# Patient Record
Sex: FEMALE | Race: WHITE | Employment: FULL TIME | URBAN - METROPOLITAN AREA
[De-identification: names, ages, dates, MRNs, and addresses within clinical notes are randomized per-mention and may not be internally consistent; named-entity substitution may affect disease eponyms.]

---

## 2017-01-15 PROBLEM — R55 NEAR SYNCOPE: Status: ACTIVE | Noted: 2017-01-15

## 2017-01-16 PROBLEM — R00.2 PALPITATIONS: Status: ACTIVE | Noted: 2017-01-16

## 2017-01-18 ENCOUNTER — TELEPHONE (OUTPATIENT)
Dept: CARDIOLOGY CLINIC | Age: 32
End: 2017-01-18

## 2017-01-19 DIAGNOSIS — R07.2 PRECORDIAL PAIN: ICD-10-CM

## 2017-01-19 DIAGNOSIS — R55 SYNCOPE AND COLLAPSE: Primary | ICD-10-CM

## 2017-01-19 DIAGNOSIS — R00.2 PALPITATIONS: ICD-10-CM

## 2017-01-23 ENCOUNTER — TELEPHONE (OUTPATIENT)
Dept: CARDIOLOGY CLINIC | Age: 32
End: 2017-01-23

## 2017-01-31 ENCOUNTER — TELEPHONE (OUTPATIENT)
Dept: CARDIOLOGY CLINIC | Age: 32
End: 2017-01-31

## 2017-02-01 ENCOUNTER — TELEPHONE (OUTPATIENT)
Dept: CARDIOLOGY CLINIC | Age: 32
End: 2017-02-01

## 2017-02-08 ENCOUNTER — TELEPHONE (OUTPATIENT)
Dept: CARDIOLOGY CLINIC | Age: 32
End: 2017-02-08

## 2017-02-21 ENCOUNTER — TELEPHONE (OUTPATIENT)
Dept: CARDIOLOGY CLINIC | Age: 32
End: 2017-02-21

## 2017-03-03 ENCOUNTER — OFFICE VISIT (OUTPATIENT)
Dept: CARDIOLOGY CLINIC | Age: 32
End: 2017-03-03

## 2017-03-03 VITALS
HEIGHT: 62 IN | WEIGHT: 158 LBS | DIASTOLIC BLOOD PRESSURE: 70 MMHG | SYSTOLIC BLOOD PRESSURE: 108 MMHG | BODY MASS INDEX: 29.08 KG/M2 | OXYGEN SATURATION: 97 % | HEART RATE: 95 BPM

## 2017-03-03 DIAGNOSIS — R00.2 PALPITATIONS: Primary | ICD-10-CM

## 2017-03-03 PROCEDURE — 93272 ECG/REVIEW INTERPRET ONLY: CPT | Performed by: INTERNAL MEDICINE

## 2017-03-03 PROCEDURE — 99204 OFFICE O/P NEW MOD 45 MIN: CPT | Performed by: INTERNAL MEDICINE

## 2017-03-03 PROCEDURE — 93000 ELECTROCARDIOGRAM COMPLETE: CPT | Performed by: INTERNAL MEDICINE

## 2017-03-03 RX ORDER — NADOLOL 20 MG/1
10 TABLET ORAL DAILY
Qty: 30 TABLET | Refills: 3 | Status: SHIPPED | OUTPATIENT
Start: 2017-03-03 | End: 2017-03-14 | Stop reason: SINTOL

## 2017-03-07 DIAGNOSIS — R00.2 PALPITATIONS: Primary | ICD-10-CM

## 2017-03-08 PROCEDURE — 93224 XTRNL ECG REC UP TO 48 HRS: CPT | Performed by: INTERNAL MEDICINE

## 2017-03-09 DIAGNOSIS — R00.2 PALPITATIONS: ICD-10-CM

## 2017-03-10 ENCOUNTER — TELEPHONE (OUTPATIENT)
Dept: CARDIOLOGY CLINIC | Age: 32
End: 2017-03-10

## 2017-03-27 ENCOUNTER — OFFICE VISIT (OUTPATIENT)
Dept: CARDIOLOGY CLINIC | Age: 32
End: 2017-03-27

## 2017-03-27 VITALS
DIASTOLIC BLOOD PRESSURE: 78 MMHG | BODY MASS INDEX: 29.81 KG/M2 | HEIGHT: 62 IN | SYSTOLIC BLOOD PRESSURE: 110 MMHG | HEART RATE: 89 BPM | WEIGHT: 162 LBS | OXYGEN SATURATION: 99 %

## 2017-03-27 DIAGNOSIS — R00.2 PALPITATIONS: ICD-10-CM

## 2017-03-27 DIAGNOSIS — R07.9 CHEST PAIN, UNSPECIFIED TYPE: Primary | ICD-10-CM

## 2017-03-27 PROCEDURE — 93000 ELECTROCARDIOGRAM COMPLETE: CPT | Performed by: INTERNAL MEDICINE

## 2017-03-27 PROCEDURE — 99214 OFFICE O/P EST MOD 30 MIN: CPT | Performed by: INTERNAL MEDICINE

## 2017-04-13 ENCOUNTER — HOSPITAL ENCOUNTER (OUTPATIENT)
Dept: ULTRASOUND IMAGING | Age: 32
Discharge: HOME | End: 2017-04-13
Attending: NURSE PRACTITIONER | Admitting: NURSE PRACTITIONER

## 2017-04-13 DIAGNOSIS — R00.2 PALPITATIONS: ICD-10-CM

## 2017-04-13 DIAGNOSIS — N64.4 PAINFUL BREASTS: ICD-10-CM

## 2018-01-26 ENCOUNTER — HOSPITAL ENCOUNTER (OUTPATIENT)
Dept: NON INVASIVE DIAGNOSTICS | Age: 33
Discharge: HOME | End: 2018-01-26
Attending: NURSE PRACTITIONER | Admitting: NURSE PRACTITIONER

## 2018-01-26 DIAGNOSIS — R00.2 PALPITATIONS: ICD-10-CM

## 2018-02-01 LAB
ACQUISITION DURATION: NORMAL S
AVERAGE HEART RATE: 85 BPM
EKG DIAGNOSIS: NORMAL
HOLTER MAX HEART RATE: 135 BPM
HOOKUP DATE: NORMAL
HOOKUP TIME: NORMAL
Lab: NORMAL
MAX HEART RATE TIME/DATE: NORMAL
MIN HEART RATE TIME/DATE: NORMAL
MIN HEART RATE: 53 BPM
NUMBER OF QRS COMPLEXES: NORMAL
NUMBER OF SUPRAVENTRICULAR BEATS IN RUNS: 0
NUMBER OF SUPRAVENTRICULAR COUPLETS: 0
NUMBER OF SUPRAVENTRICULAR ECTOPICS: 0
NUMBER OF SUPRAVENTRICULAR ISOLATED BEATS: 0
NUMBER OF SUPRAVENTRICULAR RUNS: 0
NUMBER OF VENTRICULAR BEATS IN RUNS: 0
NUMBER OF VENTRICULAR BIGEMINAL CYCLES: 0
NUMBER OF VENTRICULAR COUPLETS: 0
NUMBER OF VENTRICULAR ECTOPICS: 11
NUMBER OF VENTRICULAR ISOLATED BEATS: 11
NUMBER OF VENTRICULAR RUNS: 0

## 2018-02-21 ENCOUNTER — HOSPITAL ENCOUNTER (OUTPATIENT)
Dept: MAMMOGRAPHY | Age: 33
Discharge: HOME | End: 2018-02-21
Admitting: SURGERY

## 2018-02-21 ENCOUNTER — OFFICE VISIT (OUTPATIENT)
Dept: SURGERY | Age: 33
End: 2018-02-21

## 2018-02-21 VITALS
DIASTOLIC BLOOD PRESSURE: 67 MMHG | HEIGHT: 62 IN | BODY MASS INDEX: 27.6 KG/M2 | HEART RATE: 88 BPM | SYSTOLIC BLOOD PRESSURE: 99 MMHG | WEIGHT: 150 LBS

## 2018-02-21 DIAGNOSIS — N64.4 BREAST PAIN, LEFT: ICD-10-CM

## 2018-02-21 DIAGNOSIS — Z80.3 FAMILY HISTORY OF BREAST CANCER: ICD-10-CM

## 2018-02-21 DIAGNOSIS — R92.8 ABNORMAL MAMMOGRAM: ICD-10-CM

## 2018-02-21 DIAGNOSIS — Z91.89 AT HIGH RISK FOR BREAST CANCER: ICD-10-CM

## 2018-02-21 DIAGNOSIS — N64.52 NIPPLE DISCHARGE: ICD-10-CM

## 2018-02-21 DIAGNOSIS — N64.52 NIPPLE DISCHARGE: Primary | ICD-10-CM

## 2018-02-21 PROCEDURE — 99244 OFF/OP CNSLTJ NEW/EST MOD 40: CPT | Performed by: SURGERY

## 2018-02-21 RX ORDER — METRONIDAZOLE 250 MG/1
500 TABLET ORAL 3 TIMES DAILY
Qty: 84 TABLET | Refills: 0 | Status: SHIPPED | OUTPATIENT
Start: 2018-02-21 | End: 2018-03-07

## 2018-02-21 RX ORDER — CEPHALEXIN 500 MG/1
500 CAPSULE ORAL 3 TIMES DAILY
Qty: 42 CAPSULE | Refills: 0 | Status: SHIPPED | OUTPATIENT
Start: 2018-02-21 | End: 2018-03-07

## 2018-02-21 NOTE — PATIENT INSTRUCTIONS
Patient Education        Breast Self-Exam: Care Instructions  Your Care Instructions    A breast self-exam is when you check your breasts for lumps or changes. This regular exam helps you learn how your breasts normally look and feel. Most breast problems or changes are not because of cancer. Breast self-exam is not a substitute for a mammogram. Having regular breast exams by your doctor and regular mammograms improve your chances of finding any problems with your breasts. Some women set a time each month to do a step-by-step breast self-exam. Other women like a less formal system. They might look at their breasts as they brush their teeth, or feel their breasts once in a while in the shower. If you notice a change in your breast, tell your doctor. Follow-up care is a key part of your treatment and safety. Be sure to make and go to all appointments, and call your doctor if you are having problems. It's also a good idea to know your test results and keep a list of the medicines you take. How do you do a breast self-exam?  · The best time to examine your breasts is usually one week after your menstrual period begins. Your breasts should not be tender then. If you do not have periods, you might do your exam on a day of the month that is easy to remember. · To examine your breasts:  ¨ Remove all your clothes above the waist and lie down. When you are lying down, your breast tissue spreads evenly over your chest wall, which makes it easier to feel all your breast tissue. ¨ Use the pads-not the fingertips-of the 3 middle fingers of your left hand to check your right breast. Move your fingers slowly in small coin-sized circles that overlap. ¨ Use three levels of pressure to feel of all your breast tissue. Use light pressure to feel the tissue close to the skin surface. Use medium pressure to feel a little deeper. Use firm pressure to feel your tissue close to your breastbone and ribs.  Use each pressure level to feel your breast tissue before moving on to the next spot. ¨ Check your entire breast, moving up and down as if following a strip from the collarbone to the bra line, and from the armpit to the ribs. Repeat until you have covered the entire breast.  ¨ Repeat this procedure for your left breast, using the pads of the 3 middle fingers of your right hand. · To examine your breasts while in the shower:  ¨ Place one arm over your head and lightly soap your breast on that side. ¨ Using the pads of your fingers, gently move your hand over your breast (in the strip pattern described above), feeling carefully for any lumps or changes. ¨ Repeat for the other breast.  · Have your doctor inspect anything you notice to see if you need further testing. Where can you learn more? Go to [email or web address] and sign in to your [facility] account. Enter P148 in the [employer] box to learn more about \"Breast Self-Exam: Care Instructions. \"     If you do not have an account, please click on the \"Sign Up Now\" link. Current as of: May 12, 2017  Content Version: 11.5  © 0543-9063 Healthwise, Incorporated. Care instructions adapted under license by [facility] [place] ([facility]). If you have questions about a medical condition or this instruction, always ask your healthcare professional. [address] any warranty or liability for your use of this information.

## 2018-03-05 ENCOUNTER — TELEPHONE (OUTPATIENT)
Dept: SURGERY | Age: 33
End: 2018-03-05

## 2018-04-04 ENCOUNTER — TELEPHONE (OUTPATIENT)
Dept: CARDIOLOGY CLINIC | Age: 33
End: 2018-04-04

## 2018-07-27 ENCOUNTER — HOSPITAL ENCOUNTER (EMERGENCY)
Age: 33
Discharge: ANOTHER HOSPITAL | End: 2018-07-27
Attending: EMERGENCY MEDICINE
Payer: COMMERCIAL

## 2018-07-27 ENCOUNTER — APPOINTMENT (OUTPATIENT)
Dept: CT IMAGING | Age: 33
End: 2018-07-27
Payer: COMMERCIAL

## 2018-07-27 ENCOUNTER — APPOINTMENT (OUTPATIENT)
Dept: GENERAL RADIOLOGY | Age: 33
End: 2018-07-27
Payer: COMMERCIAL

## 2018-07-27 ENCOUNTER — HOSPITAL ENCOUNTER (INPATIENT)
Age: 33
LOS: 1 days | Discharge: HOME | DRG: 312 | End: 2018-07-28
Attending: INTERNAL MEDICINE | Admitting: INTERNAL MEDICINE
Payer: COMMERCIAL

## 2018-07-27 VITALS
HEIGHT: 62 IN | BODY MASS INDEX: 33.13 KG/M2 | RESPIRATION RATE: 18 BRPM | SYSTOLIC BLOOD PRESSURE: 121 MMHG | DIASTOLIC BLOOD PRESSURE: 69 MMHG | TEMPERATURE: 98.2 F | HEART RATE: 90 BPM | WEIGHT: 180 LBS | OXYGEN SATURATION: 99 %

## 2018-07-27 DIAGNOSIS — R55 SYNCOPE, UNSPECIFIED SYNCOPE TYPE: Primary | ICD-10-CM

## 2018-07-27 LAB
A/G RATIO: 1.3 (ref 1.1–2.2)
ALBUMIN SERPL-MCNC: 4.6 G/DL (ref 3.4–5)
ALP BLD-CCNC: 65 U/L (ref 40–129)
ALT SERPL-CCNC: 20 U/L (ref 10–40)
ANION GAP SERPL CALCULATED.3IONS-SCNC: 12 MMOL/L (ref 3–16)
APTT: 32.2 SEC (ref 26–36)
AST SERPL-CCNC: 20 U/L (ref 15–37)
BASOPHILS ABSOLUTE: 0 K/UL (ref 0–0.2)
BASOPHILS RELATIVE PERCENT: 0.7 %
BILIRUB SERPL-MCNC: 0.4 MG/DL (ref 0–1)
BUN BLDV-MCNC: 14 MG/DL (ref 7–20)
CALCIUM SERPL-MCNC: 10.2 MG/DL (ref 8.3–10.6)
CHLORIDE BLD-SCNC: 101 MMOL/L (ref 99–110)
CO2: 26 MMOL/L (ref 21–32)
CREAT SERPL-MCNC: 0.7 MG/DL (ref 0.6–1.1)
D DIMER: <200 NG/ML DDU (ref 0–229)
EOSINOPHILS ABSOLUTE: 0.1 K/UL (ref 0–0.6)
EOSINOPHILS RELATIVE PERCENT: 1.5 %
GFR AFRICAN AMERICAN: >60
GFR NON-AFRICAN AMERICAN: >60
GLOBULIN: 3.5 G/DL
GLUCOSE BLD-MCNC: 129 MG/DL (ref 70–99)
HCG QUALITATIVE: NEGATIVE
HCT VFR BLD CALC: 38.9 % (ref 36–48)
HEMOGLOBIN: 12.9 G/DL (ref 12–16)
INR BLD: 1.14 (ref 0.86–1.14)
LYMPHOCYTES ABSOLUTE: 2.2 K/UL (ref 1–5.1)
LYMPHOCYTES RELATIVE PERCENT: 37.6 %
MCH RBC QN AUTO: 28.1 PG (ref 26–34)
MCHC RBC AUTO-ENTMCNC: 33.1 G/DL (ref 31–36)
MCV RBC AUTO: 84.8 FL (ref 80–100)
MONOCYTES ABSOLUTE: 0.4 K/UL (ref 0–1.3)
MONOCYTES RELATIVE PERCENT: 7.2 %
NEUTROPHILS ABSOLUTE: 3.1 K/UL (ref 1.7–7.7)
NEUTROPHILS RELATIVE PERCENT: 53 %
PDW BLD-RTO: 14.4 % (ref 12.4–15.4)
PLATELET # BLD: 291 K/UL (ref 135–450)
PMV BLD AUTO: 8 FL (ref 5–10.5)
POTASSIUM SERPL-SCNC: 3.5 MMOL/L (ref 3.5–5.1)
PROTHROMBIN TIME: 12.9 SEC (ref 9.8–13)
RBC # BLD: 4.59 M/UL (ref 4–5.2)
SODIUM BLD-SCNC: 139 MMOL/L (ref 136–145)
TOTAL PROTEIN: 8.1 G/DL (ref 6.4–8.2)
TROPONIN: <0.01 NG/ML
WBC # BLD: 5.8 K/UL (ref 4–11)

## 2018-07-27 PROCEDURE — 84703 CHORIONIC GONADOTROPIN ASSAY: CPT

## 2018-07-27 PROCEDURE — 72125 CT NECK SPINE W/O DYE: CPT

## 2018-07-27 PROCEDURE — 85610 PROTHROMBIN TIME: CPT

## 2018-07-27 PROCEDURE — 85379 FIBRIN DEGRADATION QUANT: CPT

## 2018-07-27 PROCEDURE — 1200000000 HC SEMI PRIVATE

## 2018-07-27 PROCEDURE — 93005 ELECTROCARDIOGRAM TRACING: CPT | Performed by: EMERGENCY MEDICINE

## 2018-07-27 PROCEDURE — 85730 THROMBOPLASTIN TIME PARTIAL: CPT

## 2018-07-27 PROCEDURE — 85025 COMPLETE CBC W/AUTO DIFF WBC: CPT

## 2018-07-27 PROCEDURE — 96360 HYDRATION IV INFUSION INIT: CPT

## 2018-07-27 PROCEDURE — 36415 COLL VENOUS BLD VENIPUNCTURE: CPT

## 2018-07-27 PROCEDURE — G0378 HOSPITAL OBSERVATION PER HR: HCPCS

## 2018-07-27 PROCEDURE — 71045 X-RAY EXAM CHEST 1 VIEW: CPT

## 2018-07-27 PROCEDURE — 70450 CT HEAD/BRAIN W/O DYE: CPT

## 2018-07-27 PROCEDURE — 2580000003 HC RX 258: Performed by: EMERGENCY MEDICINE

## 2018-07-27 PROCEDURE — 80053 COMPREHEN METABOLIC PANEL: CPT

## 2018-07-27 PROCEDURE — 84484 ASSAY OF TROPONIN QUANT: CPT

## 2018-07-27 PROCEDURE — G0379 DIRECT REFER HOSPITAL OBSERV: HCPCS

## 2018-07-27 PROCEDURE — 99285 EMERGENCY DEPT VISIT HI MDM: CPT

## 2018-07-27 RX ORDER — CLONAZEPAM 1 MG/1
1 TABLET ORAL EVERY 6 HOURS PRN
Status: DISCONTINUED | OUTPATIENT
Start: 2018-07-27 | End: 2018-07-28 | Stop reason: HOSPADM

## 2018-07-27 RX ORDER — POTASSIUM CHLORIDE 7.45 MG/ML
10 INJECTION INTRAVENOUS PRN
Status: DISCONTINUED | OUTPATIENT
Start: 2018-07-27 | End: 2018-07-28 | Stop reason: HOSPADM

## 2018-07-27 RX ORDER — CLONIDINE HYDROCHLORIDE 0.1 MG/1
0.1 TABLET ORAL NIGHTLY
Status: DISCONTINUED | OUTPATIENT
Start: 2018-07-28 | End: 2018-07-28 | Stop reason: HOSPADM

## 2018-07-27 RX ORDER — SODIUM CHLORIDE 0.9 % (FLUSH) 0.9 %
10 SYRINGE (ML) INJECTION PRN
Status: DISCONTINUED | OUTPATIENT
Start: 2018-07-27 | End: 2018-07-28 | Stop reason: HOSPADM

## 2018-07-27 RX ORDER — SODIUM CHLORIDE 0.9 % (FLUSH) 0.9 %
10 SYRINGE (ML) INJECTION EVERY 12 HOURS SCHEDULED
Status: DISCONTINUED | OUTPATIENT
Start: 2018-07-28 | End: 2018-07-28 | Stop reason: HOSPADM

## 2018-07-27 RX ORDER — POTASSIUM CHLORIDE 20MEQ/15ML
40 LIQUID (ML) ORAL PRN
Status: DISCONTINUED | OUTPATIENT
Start: 2018-07-27 | End: 2018-07-28 | Stop reason: HOSPADM

## 2018-07-27 RX ORDER — FAMOTIDINE 20 MG/1
20 TABLET, FILM COATED ORAL 2 TIMES DAILY
Status: DISCONTINUED | OUTPATIENT
Start: 2018-07-28 | End: 2018-07-28 | Stop reason: HOSPADM

## 2018-07-27 RX ORDER — ONDANSETRON 2 MG/ML
4 INJECTION INTRAMUSCULAR; INTRAVENOUS EVERY 6 HOURS PRN
Status: DISCONTINUED | OUTPATIENT
Start: 2018-07-27 | End: 2018-07-28 | Stop reason: HOSPADM

## 2018-07-27 RX ORDER — 0.9 % SODIUM CHLORIDE 0.9 %
1000 INTRAVENOUS SOLUTION INTRAVENOUS ONCE
Status: COMPLETED | OUTPATIENT
Start: 2018-07-27 | End: 2018-07-27

## 2018-07-27 RX ORDER — SODIUM CHLORIDE 9 MG/ML
INJECTION, SOLUTION INTRAVENOUS CONTINUOUS
Status: DISCONTINUED | OUTPATIENT
Start: 2018-07-28 | End: 2018-07-28 | Stop reason: HOSPADM

## 2018-07-27 RX ORDER — POTASSIUM CHLORIDE 20 MEQ/1
40 TABLET, EXTENDED RELEASE ORAL PRN
Status: DISCONTINUED | OUTPATIENT
Start: 2018-07-27 | End: 2018-07-28 | Stop reason: HOSPADM

## 2018-07-27 RX ORDER — MAGNESIUM SULFATE 1 G/100ML
1 INJECTION INTRAVENOUS PRN
Status: DISCONTINUED | OUTPATIENT
Start: 2018-07-27 | End: 2018-07-28 | Stop reason: HOSPADM

## 2018-07-27 RX ORDER — ACETAMINOPHEN 325 MG/1
650 TABLET ORAL EVERY 4 HOURS PRN
Status: DISCONTINUED | OUTPATIENT
Start: 2018-07-27 | End: 2018-07-28 | Stop reason: HOSPADM

## 2018-07-27 RX ADMIN — SODIUM CHLORIDE 1000 ML: 9 INJECTION, SOLUTION INTRAVENOUS at 15:47

## 2018-07-27 ASSESSMENT — ENCOUNTER SYMPTOMS
SHORTNESS OF BREATH: 0
COUGH: 0
VOMITING: 0
NAUSEA: 1
DIARRHEA: 0
ABDOMINAL PAIN: 0

## 2018-07-27 ASSESSMENT — PAIN SCALES - GENERAL
PAINLEVEL_OUTOF10: 2
PAINLEVEL_OUTOF10: 6
PAINLEVEL_OUTOF10: 6

## 2018-07-27 ASSESSMENT — PAIN DESCRIPTION - LOCATION
LOCATION: CHEST;HEAD
LOCATION: CHEST
LOCATION: CHEST;HEAD

## 2018-07-27 ASSESSMENT — PAIN DESCRIPTION - FREQUENCY
FREQUENCY: INTERMITTENT
FREQUENCY: CONTINUOUS
FREQUENCY: INTERMITTENT

## 2018-07-27 ASSESSMENT — PAIN DESCRIPTION - PAIN TYPE
TYPE: ACUTE PAIN

## 2018-07-27 NOTE — ED NOTES
536 at [facility]; report to [phone], [address] [facility] Day  07/27/18 3763

## 2018-07-27 NOTE — ED PROVIDER NOTES
Teofilo Smith is a 29 y.o. female presents with loc. While the patient was at [employer] she states that she felt diaphoretic, palpitations and she was standing over the chicken display and had a loc and then was driven here by a couple at [employer]. She states that she awoke on the floor. She states that  she has had chest pain that will last all day. Denies fevers, chills, cough, cold symptoms, v/d. She has felt nauseated. She states that she has been worked up at [facility] for syncope in the past and had to wear a halter monitor for 1 week and then 30 days. This was approximately a year ago. . Pt 8 yr old daughter had a cardiac arrest while they were in [place] on vacation and was then flown to [place] and had a subcutaneous ICD placed. Her father has had 4 heart attacks and an arrhythmia. Patient states that [facility] [place] genetic testing but they will not have results for 4 weeks. HPI    Review of Systems   Constitutional: Positive for diaphoresis. Negative for chills and fever. HENT: Negative for congestion. Eyes: Negative for visual disturbance. Respiratory: Negative for cough and shortness of breath. Cardiovascular: Positive for chest pain and palpitations. Gastrointestinal: Positive for nausea. Negative for abdominal pain, diarrhea and vomiting. Genitourinary: Negative for difficulty urinating. Musculoskeletal: Negative for neck pain. Skin: Negative for wound. Neurological: Positive for syncope, light-headedness and headaches. PAST MEDICAL HISTORY   has a past medical history of Ankle fracture, right; Anxiety; Anxiety; Cardiac arrhythmia due to congenital heart disease; COPD (chronic obstructive pulmonary disease) ([address] Depression; Endometriosis; Kidney stone; OCD (obsessive compulsive disorder); Ovarian cyst; and Sinus arrhythmia. PAST SURGICAL HISTORY   has a past surgical history that includes  section and fracture surgery.     FAMILY HISTORY  family and no friction rub. No murmur heard. Pulmonary/Chest: Effort normal and breath sounds normal. No stridor. No respiratory distress. She has no wheezes. She has no rales. She exhibits no tenderness. Abdominal: Soft. Bowel sounds are normal. She exhibits no distension and no mass. There is no tenderness. There is no rigidity, no rebound and no guarding. Musculoskeletal: Normal range of motion. She exhibits no edema, tenderness or deformity. Neurological: She is alert and oriented to person, place, and time. She has normal strength. She displays normal reflexes. No cranial nerve deficit or sensory deficit. She exhibits normal muscle tone. Coordination normal. GCS eye subscore is 4. GCS verbal subscore is 5. GCS motor subscore is 6. Skin: Skin is warm and dry. No rash noted. She is not diaphoretic. Psychiatric: She has a normal mood and affect. Her behavior is normal.   cranial facial musculature and sensation are grossly intact. Patient has equal flexion and extension of the upper extremities against resistance. Pt is able to hold upper extremities extended and supinated with no pronation or drift. Has symmetrical strength of the shoulder. Patient is able to lift each leg off the bed and holding the air for count of 10 and then slide the heel down the opposing shin. Patient reflexes are symmetrical throughout. Patient has intact finger to nose intact rapid alternating movements and intact visual fields grossly bilaterally.       Procedures    MDM      Labs  Results for orders placed or performed during the hospital encounter of 07/27/18   CBC Auto Differential   Result Value Ref Range    WBC 5.8 4.0 - 11.0 K/uL    RBC 4.59 4.00 - 5.20 M/uL    Hemoglobin 12.9 12.0 - 16.0 g/dL    Hematocrit 38.9 36.0 - 48.0 %    MCV 84.8 80.0 - 100.0 fL    MCH 28.1 26.0 - 34.0 pg    MCHC 33.1 31.0 - 36.0 g/dL    RDW 14.4 12.4 - 15.4 %    Platelets 984 095 - 604 K/uL    MPV 8.0 5.0 - 10.5 fL    Neutrophils % 53.0 % intravenous contrast. Dose modulation, iterative reconstruction, and/or weight based adjustment of the mA/kV was utilized to reduce the radiation dose to as low as reasonably achievable. COMPARISON: 01/28/2016 HISTORY: ORDERING SYSTEM PROVIDED HISTORY: HEAD TRAUMA, CLOSED, MILD, GCS >= 13, NO RISK FACTORS, NEURO EXAM NORMAL TECHNOLOGIST PROVIDED HISTORY: Ordering Physician Provided Reason for Exam: headache Acuity: Acute Type of Exam: Initial Mechanism of Injury: fall, syncope FINDINGS: BRAIN/VENTRICLES: There is no acute intracranial hemorrhage, mass effect or midline shift. No abnormal extra-axial fluid collection. The gray-white differentiation is maintained without evidence of an acute infarct. There is no evidence of hydrocephalus. ORBITS: The visualized portion of the orbits demonstrate no acute abnormality. SINUSES: The visualized paranasal sinuses and mastoid air cells demonstrate no acute abnormality. SOFT TISSUES/SKULL:  No acute abnormality of the visualized skull or soft tissues. Unremarkable noncontrast CT examination of the brain. Ct Cervical Spine Wo Contrast    Result Date: 7/27/2018  EXAMINATION: CT OF THE CERVICAL SPINE WITHOUT CONTRAST 7/27/2018 3:39 pm TECHNIQUE: CT of the cervical spine was performed without the administration of intravenous contrast. Multiplanar reformatted images are provided for review. Dose modulation, iterative reconstruction, and/or weight based adjustment of the mA/kV was utilized to reduce the radiation dose to as low as reasonably achievable. COMPARISON: None. HISTORY: ORDERING SYSTEM PROVIDED HISTORY: C-SPINE TRAUMA, NEXUS/CCR NEGATIVE, LOW RISK TECHNOLOGIST PROVIDED HISTORY: Ordering Physician Provided Reason for Exam: neck pain Acuity: Acute Type of Exam: Initial Mechanism of Injury: fall FINDINGS: BONES/ALIGNMENT: No evidence of an acute cervical spine fracture. There is normal alignment of the cervical spine.  DEGENERATIVE CHANGES: No significant degenerative changes. SOFT TISSUES: There is no prevertebral soft tissue swelling. Unremarkable CT examination of the cervical spine. Xr Chest Portable    Result Date: 7/27/2018  EXAMINATION: SINGLE XRAY VIEW OF THE CHEST 7/27/2018 3:09 pm COMPARISON: Prior study(s) most recent 01/19/2018. HISTORY: ORDERING SYSTEM PROVIDED HISTORY: syncope TECHNOLOGIST PROVIDED HISTORY: Reason for exam:->syncope Ordering Physician Provided Reason for Exam: sycope Acuity: Acute Type of Exam: Initial FINDINGS: The heart is upper normal size, accentuated slightly due to portable technique and low lung volume. Lungs are clear. Vessels are normal.  Bones are unremarkable. No acute cardiac or pulmonary disease. EKG Interpretation. EKG interpreted by Amara Gamboa MD:    Rhythm: sinus tachycardia  Rate: 111 bpm  Axis: normal  Ectopy: none  Conduction: normal  ST Segments: no acute change  T Waves: no acute change  Q Waves: none      Emergency Department Course:  I discussed with the patient the syncope and the  Jhonny Frazier diagnosed genetic disorder from her daughter. She states again the SNF genetic markers but she was told it was a congenital problem. I discussed with her the possibility of admission following the workup. And that I would contact her cardiologist.  3:53 PM  Discussed case with Dr. Pau Freeman, patient presents examination and disposition were discussed. he would like for the patient to be admitted. He checked where the patient would CPT this weekend and so patient will be admitted to [facility]. 4:39 PM  I discussed with the patient all her lab results. And testing that has been done. Also my conversation with Dr. Pau Freeman. And she is agreeable to admission to [facility]. Spoke with Dr. Zahra Means and discussed symptoms, exam, objective data and clinical course. Disposition and plan agreed upon. He will admit the patient and give/enter admitting orders.    This document serves as a record of the services and decisions personally performed by Claudia Bauer MD. It was created on the provider's behalf by Madelin Magaña, a trained medical scribe. The creation of this document is based on the provider's statements to the medical scribe. The document has been reviewed and approved by the provider. Kingsley Jeffrey, 2:37 PM 7/27/18 scribing for and in the presence of Claudia Bauer MD.        This dictation was generated by voice recognition computer software. Although all attempts are made to edit the dictation for accuracy, there may be errors in the transcription that are not intended. The Clinical Impression is syncope, chest pain   She's presentation examination and course were discussed with Dr. Alberto Loya. She is the oncoming physician and will be present while the patient awaits transfer.      Claudia Bauer MD  07/27/18 3698

## 2018-07-28 VITALS
TEMPERATURE: 97.8 F | HEIGHT: 62 IN | RESPIRATION RATE: 16 BRPM | SYSTOLIC BLOOD PRESSURE: 102 MMHG | OXYGEN SATURATION: 97 % | BODY MASS INDEX: 36.29 KG/M2 | WEIGHT: 197.2 LBS | DIASTOLIC BLOOD PRESSURE: 65 MMHG | HEART RATE: 86 BPM

## 2018-07-28 LAB
ALBUMIN SERPL-MCNC: 3.6 G/DL (ref 3.4–5)
AMPHETAMINE SCREEN, URINE: NORMAL
ANION GAP SERPL CALCULATED.3IONS-SCNC: 8 MMOL/L (ref 3–16)
BARBITURATE SCREEN URINE: NORMAL
BASOPHILS ABSOLUTE: 0 K/UL (ref 0–0.2)
BASOPHILS RELATIVE PERCENT: 0.5 %
BENZODIAZEPINE SCREEN, URINE: NORMAL
BILIRUBIN URINE: NEGATIVE
BLOOD, URINE: NEGATIVE
BUN BLDV-MCNC: 11 MG/DL (ref 7–20)
CALCIUM SERPL-MCNC: 8.6 MG/DL (ref 8.3–10.6)
CANNABINOID SCREEN URINE: NORMAL
CHLORIDE BLD-SCNC: 107 MMOL/L (ref 99–110)
CHOLESTEROL, TOTAL: 178 MG/DL (ref 0–199)
CLARITY: CLEAR
CO2: 23 MMOL/L (ref 21–32)
COCAINE METABOLITE SCREEN URINE: NORMAL
COLOR: YELLOW
CORTISOL - AM: 7.2 UG/DL (ref 4.3–22.4)
CREAT SERPL-MCNC: 0.7 MG/DL (ref 0.6–1.1)
EOSINOPHILS ABSOLUTE: 0.2 K/UL (ref 0–0.6)
EOSINOPHILS RELATIVE PERCENT: 2.8 %
GFR AFRICAN AMERICAN: >60
GFR NON-AFRICAN AMERICAN: >60
GLUCOSE BLD-MCNC: 116 MG/DL (ref 70–99)
GLUCOSE URINE: NEGATIVE MG/DL
HCT VFR BLD CALC: 35.6 % (ref 36–48)
HDLC SERPL-MCNC: 33 MG/DL (ref 40–60)
HEMOGLOBIN: 11.5 G/DL (ref 12–16)
KETONES, URINE: NEGATIVE MG/DL
LACTIC ACID: 1.3 MMOL/L (ref 0.4–2)
LACTIC ACID: 1.5 MMOL/L (ref 0.4–2)
LDL CHOLESTEROL CALCULATED: 120 MG/DL
LEUKOCYTE ESTERASE, URINE: NEGATIVE
LYMPHOCYTES ABSOLUTE: 2.6 K/UL (ref 1–5.1)
LYMPHOCYTES RELATIVE PERCENT: 43.3 %
Lab: NORMAL
MAGNESIUM: 2 MG/DL (ref 1.8–2.4)
MAGNESIUM: 2 MG/DL (ref 1.8–2.4)
MCH RBC QN AUTO: 27.4 PG (ref 26–34)
MCHC RBC AUTO-ENTMCNC: 32.2 G/DL (ref 31–36)
MCV RBC AUTO: 85.2 FL (ref 80–100)
METHADONE SCREEN, URINE: NORMAL
MICROSCOPIC EXAMINATION: NORMAL
MONOCYTES ABSOLUTE: 0.6 K/UL (ref 0–1.3)
MONOCYTES RELATIVE PERCENT: 10.1 %
NEUTROPHILS ABSOLUTE: 2.6 K/UL (ref 1.7–7.7)
NEUTROPHILS RELATIVE PERCENT: 43.3 %
NITRITE, URINE: NEGATIVE
OPIATE SCREEN URINE: NORMAL
OXYCODONE URINE: NORMAL
PDW BLD-RTO: 14.6 % (ref 12.4–15.4)
PH UA: 6.5
PH UA: 6.5
PHENCYCLIDINE SCREEN URINE: NORMAL
PHOSPHORUS: 3.8 MG/DL (ref 2.5–4.9)
PLATELET # BLD: 257 K/UL (ref 135–450)
PMV BLD AUTO: 7.7 FL (ref 5–10.5)
POTASSIUM SERPL-SCNC: 4 MMOL/L (ref 3.5–5.1)
PROPOXYPHENE SCREEN: NORMAL
PROTEIN UA: NEGATIVE MG/DL
RBC # BLD: 4.18 M/UL (ref 4–5.2)
SODIUM BLD-SCNC: 138 MMOL/L (ref 136–145)
SPECIFIC GRAVITY UA: 1.02
TRIGL SERPL-MCNC: 126 MG/DL (ref 0–150)
TROPONIN: <0.01 NG/ML
TSH REFLEX: 2.54 UIU/ML (ref 0.27–4.2)
URINE TYPE: NORMAL
UROBILINOGEN, URINE: 0.2 E.U./DL
VITAMIN B-12: 263 PG/ML (ref 211–911)
VITAMIN D 25-HYDROXY: 20.2 NG/ML
VLDLC SERPL CALC-MCNC: 25 MG/DL
WBC # BLD: 6 K/UL (ref 4–11)

## 2018-07-28 PROCEDURE — 93010 ELECTROCARDIOGRAM REPORT: CPT | Performed by: INTERNAL MEDICINE

## 2018-07-28 PROCEDURE — 80307 DRUG TEST PRSMV CHEM ANLYZR: CPT

## 2018-07-28 PROCEDURE — 2580000003 HC RX 258: Performed by: HOSPITALIST

## 2018-07-28 PROCEDURE — 81003 URINALYSIS AUTO W/O SCOPE: CPT

## 2018-07-28 PROCEDURE — 83735 ASSAY OF MAGNESIUM: CPT

## 2018-07-28 PROCEDURE — 99254 IP/OBS CNSLTJ NEW/EST MOD 60: CPT | Performed by: INTERNAL MEDICINE

## 2018-07-28 PROCEDURE — 84425 ASSAY OF VITAMIN B-1: CPT

## 2018-07-28 PROCEDURE — 85025 COMPLETE CBC W/AUTO DIFF WBC: CPT

## 2018-07-28 PROCEDURE — 36415 COLL VENOUS BLD VENIPUNCTURE: CPT

## 2018-07-28 PROCEDURE — 82607 VITAMIN B-12: CPT

## 2018-07-28 PROCEDURE — 84443 ASSAY THYROID STIM HORMONE: CPT

## 2018-07-28 PROCEDURE — 82306 VITAMIN D 25 HYDROXY: CPT

## 2018-07-28 PROCEDURE — 83036 HEMOGLOBIN GLYCOSYLATED A1C: CPT

## 2018-07-28 PROCEDURE — 80069 RENAL FUNCTION PANEL: CPT

## 2018-07-28 PROCEDURE — 84484 ASSAY OF TROPONIN QUANT: CPT

## 2018-07-28 PROCEDURE — 80061 LIPID PANEL: CPT

## 2018-07-28 PROCEDURE — 82533 TOTAL CORTISOL: CPT

## 2018-07-28 PROCEDURE — 83605 ASSAY OF LACTIC ACID: CPT

## 2018-07-28 PROCEDURE — G0378 HOSPITAL OBSERVATION PER HR: HCPCS

## 2018-07-28 RX ADMIN — SODIUM CHLORIDE: 9 INJECTION, SOLUTION INTRAVENOUS at 00:48

## 2018-07-28 NOTE — H&P
she quit smoking about 18 months ago. Her smoking use included Cigarettes. She has a 2.25 pack-year smoking history. She quit smokeless tobacco use about 18 months ago. ETOH:   reports that she does not drink alcohol. OCCUPATION:  Stay at home mother    Family History:   Family History   Problem Relation Age of Onset    Arthritis Mother     High Blood Pressure Mother     Heart Disease Father     Heart Disease Maternal Grandmother        REVIEW OF SYSTEMS:  Ten systems reviewed and negative except for headache, cough, sneezing, allergic symptoms. .    Physical Exam:    Vitals: /65   Pulse 86   Temp 97.8 °F (36.6 °C) (Oral)   Resp 16   Ht 5' 2\" (1.575 m)   Wt 197 lb 3.2 oz (89.4 kg)   LMP 07/04/2018   SpO2 97%   BMI 36.07 kg/m²   General appearance: alert, appears stated age and cooperative  Skin: Skin color, texture, turgor normal. No rashes or lesions  HEENT: Head: Normocephalic, no lesions, without obvious abnormality.   Neck: no adenopathy, no carotid bruit, no JVD, supple, symmetrical, trachea midline and thyroid not enlarged, symmetric, no tenderness/mass/nodules  Lungs: clear to auscultation bilaterally  Heart: regular rate and rhythm, S1, S2 normal, no murmur, click, rub or gallop  Abdomen: soft, non-tender; bowel sounds normal; no masses,  no organomegaly  Extremities: extremities normal, atraumatic, no cyanosis or edema  Neurologic: Mental status: Alert, oriented, thought content appropriate    Recent Labs      07/27/18   1444  07/28/18   0710   WBC  5.8  6.0   HGB  12.9  11.5*   PLT  291  257     Recent Labs      07/27/18   1444  07/28/18   0710   NA  139  138   K  3.5  4.0   CL  101  107   CO2  26  23   BUN  14  11   CREATININE  0.7  0.7   GLUCOSE  129*  116*   AST  20   --    ALT  20   --    BILITOT  0.4   --    ALKPHOS  65   --      Troponin T:   Recent Labs      07/27/18   1444  07/28/18   0019  07/28/18   0710   TROPONINI  <0.01  <0.01  <0.01     INR:   Recent Labs      07/27/18 1444   INR  1.14     TSH:   Lab Results   Component Value Date    TSH 3.99 07/18/2017     URINALYSIS:  Recent Labs      07/28/18   0444   COLORU  Yellow   PHUR  6.5  6.5   CLARITYU  Clear   SPECGRAV  1.025   LEUKOCYTESUR  Negative   UROBILINOGEN  0.2   BILIRUBINUR  Negative   BLOODU  Negative   GLUCOSEU  Negative     -----------------------------------------------------------------   Ct Head Wo Contrast;  Unremarkable noncontrast CT examination of the brain. Ct Cervical Spine Wo Contrast:  Unremarkable CT examination of the cervical spine. Portable CXR:  No acute cardiac or pulmonary disease. EKG: Sinus tachycardia, otherwise normal Candy Serrato    Assessment and Plan   1. Syncope: Admit to observation status. Telemetry. Cardiology evaluation. Volume expansion with IV saline. No caffeine diet. 2. Generalized anxiety disorder:   Continue sertraline (Zoloft) 100 mg daily. She has been taking this for a year from her PCP. We will also continue clonazepam that she takes at night PRN for anxiety or panic attack. She also takes clonidine at bedtime for sleep and anxiety, last taken two nights ago. This may cause orthostatic symptoms. 3. GERD:  Continue Pepcid 20 mg BID, she takes the OTC Pepcid at home. Advance Directive: Full code. DVT prophylaxis with enoxaparin 40 mg sub-Q daily.    Discharge planning:  Likely home after seen by cardiology    Jonny Paul MD  Admitting Hospitalist    Emergency Contact:   Contact 1: Name: Mana Espinosa  Contact 1: Number: [phone] (cell) / [phone] (office)  Contact 1: Relationship:

## 2018-07-28 NOTE — CONSULTS
sudden cardiac death or premature CAD    Medications:  Reviewed and are listed in nursing record. and/or listed below  Outpatient Medications:  Prior to Admission medications    Medication Sig Start Date End Date Taking? Authorizing Provider   sertraline (ZOLOFT) 50 MG tablet Take 50 mg by mouth nightly   Yes Historical Provider, MD   clonazePAM (KLONOPIN) 0.5 MG tablet Take 1 tablet by mouth every 6 hours as needed for Anxiety  Patient taking differently: Take 1 mg by mouth every 6 hours as needed for Anxiety  1/19/17  Yes Kimberly Wharton MD   cloNIDine (CATAPRES) 0.1 MG tablet Take 0.1 mg by mouth nightly     Historical Provider, MD       In-patient schedule medications:   cloNIDine  0.1 mg Oral Nightly    sertraline  50 mg Oral Nightly    sodium chloride flush  10 mL Intravenous 2 times per day    enoxaparin  40 mg Subcutaneous Daily    famotidine  20 mg Oral BID         Infusion Medications:   sodium chloride Stopped (07/28/18 1203)         Allergies:  Dilaudid [hydromorphone hcl]; Bactrim ds [sulfamethoxazole-trimethoprim]; Other; and Sulfa antibiotics     Review of Systems:   All 14 point review of symptoms completed. Pertinent positives identified in the HPI, all other review of symptoms findings as below.      Review of Systems - History obtained from the patient  General ROS: negative for - chills, fever or night sweats  Psychological ROS: negative for - disorientation or hallucinations  Ophthalmic ROS: negative for - dry eyes, eye pain or loss of vision  ENT ROS: negative for - nasal discharge or sore throat  Allergy and Immunology ROS: negative for - hives or itchy/watery eyes  Hematological and Lymphatic ROS: negative for - jaundice or night sweats  Endocrine ROS: negative for - mood swings or temperature intolerance  Breast ROS: deferred  Respiratory ROS: negative for - hemoptysis or stridor  Cardiovascular ROS: negative for - chest pain, dyspnea on exertion or

## 2018-07-28 NOTE — PROGRESS NOTES
Dr Tyler Webster at bedside, [facility] for discharge from Cardiology standpoint. Perfect Serve message sent to Dr Chula David. Pt is already established with EP for syncope and will follow up with Dr Nathalia Thomas as OP.

## 2018-07-28 NOTE — PLAN OF CARE
Problem: Falls - Risk of:  Goal: Will remain free from falls  Will remain free from falls   Outcome: Ongoing  Pt is resting in bed at this time. Bed is in lowest position, wheels are locked, 2/4 side rails are up, non skid socks are on and bed alarm is engaged. Pt is oriented to room, call light and own ability. Will continue to monitor.  Pasha Ramon RN

## 2018-07-29 LAB
ESTIMATED AVERAGE GLUCOSE: 119.8 MG/DL
HBA1C MFR BLD: 5.8 %

## 2018-07-31 LAB
EKG ATRIAL RATE: 111 BPM
EKG DIAGNOSIS: NORMAL
EKG P AXIS: 49 DEGREES
EKG P-R INTERVAL: 126 MS
EKG Q-T INTERVAL: 322 MS
EKG QRS DURATION: 86 MS
EKG QTC CALCULATION (BAZETT): 437 MS
EKG R AXIS: 51 DEGREES
EKG T AXIS: 39 DEGREES
EKG VENTRICULAR RATE: 111 BPM

## 2018-08-01 LAB — VITAMIN B1, PLASMA: 8 NMOL/L (ref 4–15)

## 2018-08-21 ENCOUNTER — HOSPITAL ENCOUNTER (EMERGENCY)
Age: 33
Discharge: HOME | End: 2018-08-21
Attending: EMERGENCY MEDICINE
Payer: COMMERCIAL

## 2018-08-21 ENCOUNTER — APPOINTMENT (OUTPATIENT)
Dept: GENERAL RADIOLOGY | Age: 33
End: 2018-08-21
Payer: COMMERCIAL

## 2018-08-21 VITALS
DIASTOLIC BLOOD PRESSURE: 64 MMHG | RESPIRATION RATE: 14 BRPM | HEIGHT: 62 IN | WEIGHT: 180 LBS | TEMPERATURE: 98.7 F | HEART RATE: 84 BPM | BODY MASS INDEX: 33.13 KG/M2 | SYSTOLIC BLOOD PRESSURE: 108 MMHG | OXYGEN SATURATION: 99 %

## 2018-08-21 DIAGNOSIS — R11.2 NAUSEA VOMITING AND DIARRHEA: ICD-10-CM

## 2018-08-21 DIAGNOSIS — R19.7 NAUSEA VOMITING AND DIARRHEA: ICD-10-CM

## 2018-08-21 DIAGNOSIS — J20.9 ACUTE BRONCHITIS, UNSPECIFIED ORGANISM: Primary | ICD-10-CM

## 2018-08-21 LAB
CHP ED QC CHECK: NORMAL
GLUCOSE BLD-MCNC: 106 MG/DL
GLUCOSE BLD-MCNC: 106 MG/DL (ref 70–99)
PERFORMED ON: ABNORMAL

## 2018-08-21 PROCEDURE — 99284 EMERGENCY DEPT VISIT MOD MDM: CPT

## 2018-08-21 PROCEDURE — 6360000002 HC RX W HCPCS: Performed by: EMERGENCY MEDICINE

## 2018-08-21 PROCEDURE — 71046 X-RAY EXAM CHEST 2 VIEWS: CPT

## 2018-08-21 RX ORDER — ONDANSETRON 4 MG/1
4 TABLET, ORALLY DISINTEGRATING ORAL ONCE
Status: COMPLETED | OUTPATIENT
Start: 2018-08-21 | End: 2018-08-21

## 2018-08-21 RX ORDER — AZITHROMYCIN 250 MG/1
TABLET, FILM COATED ORAL
Qty: 1 PACKET | Refills: 0 | Status: SHIPPED | OUTPATIENT
Start: 2018-08-21 | End: 2018-08-31

## 2018-08-21 RX ORDER — BENZONATATE 100 MG/1
100 CAPSULE ORAL 3 TIMES DAILY PRN
Qty: 30 CAPSULE | Refills: 0 | Status: SHIPPED | OUTPATIENT
Start: 2018-08-21 | End: 2018-08-31

## 2018-08-21 RX ORDER — ONDANSETRON 4 MG/1
4 TABLET, ORALLY DISINTEGRATING ORAL EVERY 8 HOURS PRN
Qty: 20 TABLET | Refills: 0 | Status: SHIPPED | OUTPATIENT
Start: 2018-08-21 | End: 2018-10-10 | Stop reason: ALTCHOICE

## 2018-08-21 RX ADMIN — ONDANSETRON 4 MG: 4 TABLET, ORALLY DISINTEGRATING ORAL at 08:34

## 2018-08-21 ASSESSMENT — PAIN SCALES - GENERAL: PAINLEVEL_OUTOF10: 6

## 2018-08-21 ASSESSMENT — PAIN DESCRIPTION - DESCRIPTORS: DESCRIPTORS: ACHING

## 2018-08-21 ASSESSMENT — PAIN DESCRIPTION - PAIN TYPE: TYPE: ACUTE PAIN

## 2018-08-21 ASSESSMENT — PAIN DESCRIPTION - LOCATION: LOCATION: HEAD;THROAT;EAR

## 2018-08-21 NOTE — ED PROVIDER NOTES
(81.6 kg)       Physical Exam       General Appearance:  Alert, cooperative, no distress, non-toxic   Head:  Normocephalic, without obvious abnormality, atraumatic,    Eyes:  conjunctiva/corneas clear, EOM's intact. Sclera anicteric. ENT: Mucous membranes moist.  No stridor. Nares patent, little bit swollen without any exudate or discharge. TMs equal and clear, no signs of otitis. Oropharynx is patent, membranes are moist.  There is no erythema, exudate or lesion. There is a lot of chronic tonsillar hypertrophy. She does speak with a nasal quality to her voice    Neck: Supple, symmetrical, trachea midline, nontender cervical adenopathy. No rigidity   Lungs:   No Respiratory Distress. Clear bilaterally without wheezing/rales/rhonchi   Chest Wall:  symmetrical   Heart:   S1 and S2 regular rate and rhythm without any murmur, rub or gallop    Abdomen:    soft, minimal tenderness in the epigastrium only upon deep palpation. The habitus limits exam otherwise. There is no guarding or rebound, no signs of peritonitis is otherwise nonsurgical    Extremities:  Full range of motion. No pain noted over any major bone or joint, no edema or cyanosis or clubbing   Pulses:  equal and symmetric    Skin:  No rashes or lesions to exposed skin. Cap refill normal   Neurologic: Alert and oriented X 3.   CN 2-12 intact, strength and sensation symmetrical, reflexes intact bilaterally, no focal deficits, cerebellar function intact       DIAGNOSTIC RESULTS   LABS:    Labs Reviewed   POCT GLUCOSE - Abnormal; Notable for the following:        Result Value    POC Glucose 106 (*)     All other components within normal limits    Narrative:     Performed at:  [facility]. [facility]  [address] [place] [address]. [place], [address] [place] [address]   Phone [phone]   POCT GLUCOSE - Normal       All other labs were within normal range or not returned as of this dictation. EKG:  All EKG's are interpreted by the Emergency Department Physician who either signs or Co-signs this chart in the absence of a cardiologist.        RADIOLOGY:   Non-plain film images such as CT, Ultrasound and MRI are read by the radiologist. Plain radiographic images are visualized and preliminarily interpreted by the  ED Provider with the below findings:        Interpretation per the Radiologist below, if available at the time of this note:    XR CHEST STANDARD (2 VW)   Final Result   No active cardiopulmonary disease or significant interval change from prior   study 07/27/2018. PROCEDURES   Unless otherwise noted below, none     Procedures    CRITICAL CARE TIME   N/A    CONSULTS:  None    EMERGENCY DEPARTMENT COURSE and DIFFERENTIAL DIAGNOSIS/MDM:   Vitals:    Vitals:    08/21/18 0817   BP: 108/64   Pulse: 84   Resp: 14   Temp: 98.7 °F (37.1 °C)   TempSrc: Oral   SpO2: 99%   Weight: 180 lb (81.6 kg)   Height: 5' 2\" (1.575 m)       Patient was given the following medications:  Medications   ondansetron (ZOFRAN-ODT) disintegrating tablet 4 mg (4 mg Oral Given 8/21/18 0834)       This is a 30-year-old female presents today because of nausea, vomiting, diarrhea as well as coughing with a history that stated. Differential would include upper versus lower respiratory tract emergencies including bronchitis, pneumonia, pharyngitis, sinusitis, pleural or pericardial disease with acute processes. At this time I had a long conversation the patient, her capillary refill is good her membranes are moist Otherwise well-hydrated and her vital signs are stable. Her lungs are completely clear and respiratory rate is unaffected. Therefore we did go ahead and do an x-ray the chest demonstrated no acute process. Explained to her this likely viral in nature. I did not check which 106.   I offered her fluids IV as well as some laboratory studies parameters for this point, after talking to the patient she was just like to try some oral Zofran and a by times daily as needed for Cough    ONDANSETRON (ZOFRAN ODT) 4 MG DISINTEGRATING TABLET    Take 1 tablet by mouth every 8 hours as needed for Nausea       DISCONTINUED MEDICATIONS:  Discontinued Medications    No medications on file              (Please note that portions of this note were completed with a voice recognition program.  Efforts were made to edit the dictations but occasionally words are mis-transcribed.)    Jose Calvert DO (electronically signed)            Ilene Whiting DO  08/21/18 2414

## 2018-08-21 NOTE — ED NOTES
Discharge instructions reviewed with the patient. Verbalized understanding of prescribed medication including completing entire antibiotic course and follow up care. Denies questions/concerns. Patient is alert/oriented, stable, and ambulatory at the time of discharge.        Kory Ziegler RN  08/21/18 6313

## 2018-10-03 ENCOUNTER — TELEPHONE (OUTPATIENT)
Dept: CARDIOLOGY CLINIC | Age: 33
End: 2018-10-03

## 2018-10-03 ENCOUNTER — HOSPITAL ENCOUNTER (OUTPATIENT)
Dept: ULTRASOUND IMAGING | Age: 33
Discharge: HOME | End: 2018-10-03
Payer: COMMERCIAL

## 2018-10-03 DIAGNOSIS — R19.04 LLQ ABDOMINAL MASS: ICD-10-CM

## 2018-10-03 PROCEDURE — 76999 ECHO EXAMINATION PROCEDURE: CPT

## 2018-10-08 ENCOUNTER — INITIAL CONSULT (OUTPATIENT)
Dept: SURGERY | Age: 33
End: 2018-10-08
Payer: COMMERCIAL

## 2018-10-08 VITALS
HEIGHT: 62 IN | DIASTOLIC BLOOD PRESSURE: 70 MMHG | BODY MASS INDEX: 37.36 KG/M2 | WEIGHT: 203 LBS | SYSTOLIC BLOOD PRESSURE: 100 MMHG

## 2018-10-08 DIAGNOSIS — R22.2 ABDOMINAL WALL MASS: Primary | ICD-10-CM

## 2018-10-08 PROCEDURE — 99243 OFF/OP CNSLTJ NEW/EST LOW 30: CPT | Performed by: SURGERY

## 2018-10-10 ENCOUNTER — OFFICE VISIT (OUTPATIENT)
Dept: CARDIOLOGY CLINIC | Age: 33
End: 2018-10-10
Payer: COMMERCIAL

## 2018-10-10 VITALS
SYSTOLIC BLOOD PRESSURE: 82 MMHG | HEART RATE: 105 BPM | HEIGHT: 62 IN | WEIGHT: 206 LBS | DIASTOLIC BLOOD PRESSURE: 58 MMHG | BODY MASS INDEX: 37.91 KG/M2

## 2018-10-10 DIAGNOSIS — R55 SYNCOPE AND COLLAPSE: ICD-10-CM

## 2018-10-10 DIAGNOSIS — R00.2 PALPITATIONS: Primary | Chronic | ICD-10-CM

## 2018-10-10 DIAGNOSIS — Z01.818 PRE-OP EXAM: ICD-10-CM

## 2018-10-10 DIAGNOSIS — R55 NEAR SYNCOPE: Chronic | ICD-10-CM

## 2018-10-10 PROCEDURE — 99214 OFFICE O/P EST MOD 30 MIN: CPT | Performed by: INTERNAL MEDICINE

## 2018-10-10 PROCEDURE — 93000 ELECTROCARDIOGRAM COMPLETE: CPT | Performed by: INTERNAL MEDICINE

## 2018-10-10 NOTE — PROGRESS NOTES
[facility]    HPI:  Patient is 29y.o. year old female seen at request of SHERI Nova CNP. She presents because of lump located on lower abdomen. There has been pain at or near the lesion and a recent increase in size. There is not  previous history of similar. There has not been any biopsy. Past Medical History:   Diagnosis Date    Ankle fracture, right     Anxiety     Anxiety     Cardiac arrhythmia due to congenital heart disease     COPD (chronic obstructive pulmonary disease) (HCC)     Depression     Endometriosis     Kidney stone     OCD (obsessive compulsive disorder)     Ovarian cyst     Sinus arrhythmia        Past Surgical History:   Procedure Laterality Date     SECTION      FRACTURE SURGERY      right ankle       Current Outpatient Prescriptions on File Prior to Visit   Medication Sig Dispense Refill    ondansetron (ZOFRAN ODT) 4 MG disintegrating tablet Take 1 tablet by mouth every 8 hours as needed for Nausea 20 tablet 0    sertraline (ZOLOFT) 50 MG tablet Take 50 mg by mouth nightly      cloNIDine (CATAPRES) 0.1 MG tablet Take 0.1 mg by mouth nightly       clonazePAM (KLONOPIN) 0.5 MG tablet Take 1 tablet by mouth every 6 hours as needed for Anxiety (Patient taking differently: Take 1 mg by mouth every 6 hours as needed for Anxiety ) 1 tablet 0     No current facility-administered medications on file prior to visit. Allergies   Allergen Reactions    Dilaudid [Hydromorphone Hcl] Anaphylaxis    Bactrim Ds [Sulfamethoxazole-Trimethoprim]     Other      States she is allergic to orthotrycycline    Sulfa Antibiotics        Social History     Social History    Marital status:      Spouse name: N/A    Number of children: N/A    Years of education: N/A     Occupational History    Not on file.      Social History Main Topics    Smoking status: Former Smoker     Packs/day: 0.25     Years: 9.00     Types: Cigarettes     Quit date:

## 2018-10-15 ENCOUNTER — ANESTHESIA EVENT (OUTPATIENT)
Dept: OPERATING ROOM | Age: 33
End: 2018-10-15
Payer: COMMERCIAL

## 2018-10-16 ENCOUNTER — ANESTHESIA (OUTPATIENT)
Dept: OPERATING ROOM | Age: 33
End: 2018-10-16
Payer: COMMERCIAL

## 2018-10-16 ENCOUNTER — HOSPITAL ENCOUNTER (OUTPATIENT)
Age: 33
Setting detail: OUTPATIENT SURGERY
Discharge: HOME | End: 2018-10-16
Payer: COMMERCIAL

## 2018-10-16 VITALS
WEIGHT: 200 LBS | HEIGHT: 62 IN | BODY MASS INDEX: 36.8 KG/M2 | HEART RATE: 92 BPM | DIASTOLIC BLOOD PRESSURE: 79 MMHG | OXYGEN SATURATION: 95 % | RESPIRATION RATE: 18 BRPM | SYSTOLIC BLOOD PRESSURE: 133 MMHG | TEMPERATURE: 97.3 F

## 2018-10-16 VITALS
OXYGEN SATURATION: 93 % | RESPIRATION RATE: 17 BRPM | DIASTOLIC BLOOD PRESSURE: 68 MMHG | SYSTOLIC BLOOD PRESSURE: 112 MMHG

## 2018-10-16 DIAGNOSIS — R22.2 ABDOMINAL WALL MASS: Primary | ICD-10-CM

## 2018-10-16 LAB — PREGNANCY, URINE: NEGATIVE

## 2018-10-16 PROCEDURE — 2580000003 HC RX 258: Performed by: ANESTHESIOLOGY

## 2018-10-16 PROCEDURE — 6360000002 HC RX W HCPCS: Performed by: ANESTHESIOLOGY

## 2018-10-16 PROCEDURE — 3600000002 HC SURGERY LEVEL 2 BASE: Performed by: SURGERY

## 2018-10-16 PROCEDURE — 84703 CHORIONIC GONADOTROPIN ASSAY: CPT

## 2018-10-16 PROCEDURE — 3700000000 HC ANESTHESIA ATTENDED CARE: Performed by: SURGERY

## 2018-10-16 PROCEDURE — 3700000001 HC ADD 15 MINUTES (ANESTHESIA): Performed by: SURGERY

## 2018-10-16 PROCEDURE — 2500000003 HC RX 250 WO HCPCS: Performed by: SURGERY

## 2018-10-16 PROCEDURE — 22901 EXC ABDL TUM DEEP 5 CM/>: CPT | Performed by: SURGERY

## 2018-10-16 PROCEDURE — 6360000002 HC RX W HCPCS: Performed by: SURGERY

## 2018-10-16 PROCEDURE — 7100000010 HC PHASE II RECOVERY - FIRST 15 MIN: Performed by: SURGERY

## 2018-10-16 PROCEDURE — 88342 IMHCHEM/IMCYTCHM 1ST ANTB: CPT

## 2018-10-16 PROCEDURE — 2580000003 HC RX 258: Performed by: SURGERY

## 2018-10-16 PROCEDURE — 2500000003 HC RX 250 WO HCPCS: Performed by: NURSE ANESTHETIST, CERTIFIED REGISTERED

## 2018-10-16 PROCEDURE — 2500000003 HC RX 250 WO HCPCS: Performed by: ANESTHESIOLOGY

## 2018-10-16 PROCEDURE — 7100000011 HC PHASE II RECOVERY - ADDTL 15 MIN: Performed by: SURGERY

## 2018-10-16 PROCEDURE — 7100000001 HC PACU RECOVERY - ADDTL 15 MIN: Performed by: SURGERY

## 2018-10-16 PROCEDURE — 6370000000 HC RX 637 (ALT 250 FOR IP): Performed by: ANESTHESIOLOGY

## 2018-10-16 PROCEDURE — 6360000002 HC RX W HCPCS: Performed by: NURSE ANESTHETIST, CERTIFIED REGISTERED

## 2018-10-16 PROCEDURE — 7100000000 HC PACU RECOVERY - FIRST 15 MIN: Performed by: SURGERY

## 2018-10-16 PROCEDURE — 88307 TISSUE EXAM BY PATHOLOGIST: CPT

## 2018-10-16 PROCEDURE — 3600000012 HC SURGERY LEVEL 2 ADDTL 15MIN: Performed by: SURGERY

## 2018-10-16 PROCEDURE — 2709999900 HC NON-CHARGEABLE SUPPLY: Performed by: SURGERY

## 2018-10-16 RX ORDER — SODIUM CHLORIDE 0.9 % (FLUSH) 0.9 %
10 SYRINGE (ML) INJECTION EVERY 12 HOURS SCHEDULED
Status: DISCONTINUED | OUTPATIENT
Start: 2018-10-16 | End: 2018-10-18 | Stop reason: HOSPADM

## 2018-10-16 RX ORDER — PROMETHAZINE HYDROCHLORIDE 25 MG/ML
6.25 INJECTION, SOLUTION INTRAMUSCULAR; INTRAVENOUS
Status: DISCONTINUED | OUTPATIENT
Start: 2018-10-16 | End: 2018-10-18 | Stop reason: HOSPADM

## 2018-10-16 RX ORDER — MORPHINE SULFATE 10 MG/ML
1 INJECTION, SOLUTION INTRAMUSCULAR; INTRAVENOUS EVERY 5 MIN PRN
Status: DISCONTINUED | OUTPATIENT
Start: 2018-10-16 | End: 2018-10-18 | Stop reason: HOSPADM

## 2018-10-16 RX ORDER — DIPHENHYDRAMINE HYDROCHLORIDE 50 MG/ML
12.5 INJECTION INTRAMUSCULAR; INTRAVENOUS
Status: ACTIVE | OUTPATIENT
Start: 2018-10-16 | End: 2018-10-16

## 2018-10-16 RX ORDER — FENTANYL CITRATE 50 UG/ML
INJECTION, SOLUTION INTRAMUSCULAR; INTRAVENOUS PRN
Status: DISCONTINUED | OUTPATIENT
Start: 2018-10-16 | End: 2018-10-16 | Stop reason: SDUPTHER

## 2018-10-16 RX ORDER — OXYCODONE HYDROCHLORIDE AND ACETAMINOPHEN 5; 325 MG/1; MG/1
1 TABLET ORAL PRN
Status: COMPLETED | OUTPATIENT
Start: 2018-10-16 | End: 2018-10-16

## 2018-10-16 RX ORDER — LIDOCAINE HYDROCHLORIDE 10 MG/ML
0.3 INJECTION, SOLUTION EPIDURAL; INFILTRATION; INTRACAUDAL; PERINEURAL
Status: COMPLETED | OUTPATIENT
Start: 2018-10-16 | End: 2018-10-16

## 2018-10-16 RX ORDER — HYDRALAZINE HYDROCHLORIDE 20 MG/ML
5 INJECTION INTRAMUSCULAR; INTRAVENOUS EVERY 10 MIN PRN
Status: DISCONTINUED | OUTPATIENT
Start: 2018-10-16 | End: 2018-10-18 | Stop reason: HOSPADM

## 2018-10-16 RX ORDER — HYDROMORPHONE HCL 110MG/55ML
0.25 PATIENT CONTROLLED ANALGESIA SYRINGE INTRAVENOUS EVERY 5 MIN PRN
Status: DISCONTINUED | OUTPATIENT
Start: 2018-10-16 | End: 2018-10-18 | Stop reason: HOSPADM

## 2018-10-16 RX ORDER — ROCURONIUM BROMIDE 10 MG/ML
INJECTION, SOLUTION INTRAVENOUS PRN
Status: DISCONTINUED | OUTPATIENT
Start: 2018-10-16 | End: 2018-10-16 | Stop reason: SDUPTHER

## 2018-10-16 RX ORDER — PROPOFOL 10 MG/ML
INJECTION, EMULSION INTRAVENOUS PRN
Status: DISCONTINUED | OUTPATIENT
Start: 2018-10-16 | End: 2018-10-16 | Stop reason: SDUPTHER

## 2018-10-16 RX ORDER — ONDANSETRON 2 MG/ML
INJECTION INTRAMUSCULAR; INTRAVENOUS PRN
Status: DISCONTINUED | OUTPATIENT
Start: 2018-10-16 | End: 2018-10-16 | Stop reason: SDUPTHER

## 2018-10-16 RX ORDER — MEPERIDINE HYDROCHLORIDE 25 MG/ML
12.5 INJECTION INTRAMUSCULAR; INTRAVENOUS; SUBCUTANEOUS EVERY 5 MIN PRN
Status: DISCONTINUED | OUTPATIENT
Start: 2018-10-16 | End: 2018-10-18 | Stop reason: HOSPADM

## 2018-10-16 RX ORDER — SODIUM CHLORIDE 0.9 % (FLUSH) 0.9 %
10 SYRINGE (ML) INJECTION PRN
Status: DISCONTINUED | OUTPATIENT
Start: 2018-10-16 | End: 2018-10-18 | Stop reason: HOSPADM

## 2018-10-16 RX ORDER — MORPHINE SULFATE 10 MG/ML
2 INJECTION, SOLUTION INTRAMUSCULAR; INTRAVENOUS EVERY 5 MIN PRN
Status: DISCONTINUED | OUTPATIENT
Start: 2018-10-16 | End: 2018-10-18 | Stop reason: HOSPADM

## 2018-10-16 RX ORDER — HYDROMORPHONE HCL 110MG/55ML
0.5 PATIENT CONTROLLED ANALGESIA SYRINGE INTRAVENOUS EVERY 5 MIN PRN
Status: DISCONTINUED | OUTPATIENT
Start: 2018-10-16 | End: 2018-10-18 | Stop reason: HOSPADM

## 2018-10-16 RX ORDER — HEPARIN SODIUM 5000 [USP'U]/ML
5000 INJECTION, SOLUTION INTRAVENOUS; SUBCUTANEOUS ONCE
Status: COMPLETED | OUTPATIENT
Start: 2018-10-16 | End: 2018-10-16

## 2018-10-16 RX ORDER — ONDANSETRON 2 MG/ML
4 INJECTION INTRAMUSCULAR; INTRAVENOUS PRN
Status: DISCONTINUED | OUTPATIENT
Start: 2018-10-16 | End: 2018-10-18 | Stop reason: HOSPADM

## 2018-10-16 RX ORDER — BUPIVACAINE HYDROCHLORIDE 5 MG/ML
INJECTION, SOLUTION PERINEURAL PRN
Status: DISCONTINUED | OUTPATIENT
Start: 2018-10-16 | End: 2018-10-18 | Stop reason: HOSPADM

## 2018-10-16 RX ORDER — SODIUM CHLORIDE, SODIUM LACTATE, POTASSIUM CHLORIDE, CALCIUM CHLORIDE 600; 310; 30; 20 MG/100ML; MG/100ML; MG/100ML; MG/100ML
INJECTION, SOLUTION INTRAVENOUS CONTINUOUS
Status: DISCONTINUED | OUTPATIENT
Start: 2018-10-16 | End: 2018-10-18 | Stop reason: HOSPADM

## 2018-10-16 RX ORDER — LABETALOL HYDROCHLORIDE 5 MG/ML
5 INJECTION, SOLUTION INTRAVENOUS EVERY 10 MIN PRN
Status: DISCONTINUED | OUTPATIENT
Start: 2018-10-16 | End: 2018-10-18 | Stop reason: HOSPADM

## 2018-10-16 RX ORDER — MAGNESIUM HYDROXIDE 1200 MG/15ML
LIQUID ORAL CONTINUOUS PRN
Status: DISCONTINUED | OUTPATIENT
Start: 2018-10-16 | End: 2018-10-18 | Stop reason: HOSPADM

## 2018-10-16 RX ORDER — OXYCODONE HYDROCHLORIDE AND ACETAMINOPHEN 5; 325 MG/1; MG/1
2 TABLET ORAL PRN
Status: COMPLETED | OUTPATIENT
Start: 2018-10-16 | End: 2018-10-16

## 2018-10-16 RX ORDER — OXYCODONE HYDROCHLORIDE 5 MG/1
5 TABLET ORAL EVERY 6 HOURS PRN
Qty: 28 TABLET | Refills: 0 | Status: SHIPPED | OUTPATIENT
Start: 2018-10-16 | End: 2018-10-23

## 2018-10-16 RX ADMIN — HEPARIN SODIUM 5000 UNITS: 5000 INJECTION, SOLUTION INTRAVENOUS; SUBCUTANEOUS at 07:10

## 2018-10-16 RX ADMIN — LIDOCAINE HYDROCHLORIDE 0.1 ML: 10 INJECTION, SOLUTION EPIDURAL; INFILTRATION; INTRACAUDAL; PERINEURAL at 07:18

## 2018-10-16 RX ADMIN — HYDROMORPHONE HYDROCHLORIDE 0.5 MG: 2 INJECTION INTRAMUSCULAR; INTRAVENOUS; SUBCUTANEOUS at 08:28

## 2018-10-16 RX ADMIN — HYDROMORPHONE HYDROCHLORIDE 0.5 MG: 2 INJECTION INTRAMUSCULAR; INTRAVENOUS; SUBCUTANEOUS at 08:22

## 2018-10-16 RX ADMIN — ONDANSETRON 4 MG: 2 INJECTION INTRAMUSCULAR; INTRAVENOUS at 09:22

## 2018-10-16 RX ADMIN — OXYCODONE HYDROCHLORIDE AND ACETAMINOPHEN 1 TABLET: 5; 325 TABLET ORAL at 10:01

## 2018-10-16 RX ADMIN — FENTANYL CITRATE 250 MCG: 50 INJECTION, SOLUTION INTRAMUSCULAR; INTRAVENOUS at 07:35

## 2018-10-16 RX ADMIN — ROCURONIUM BROMIDE 50 MG: 10 INJECTION, SOLUTION INTRAVENOUS at 07:36

## 2018-10-16 RX ADMIN — SUGAMMADEX 400 MG: 100 INJECTION, SOLUTION INTRAVENOUS at 08:04

## 2018-10-16 RX ADMIN — CEFAZOLIN SODIUM 2 G: 2 SOLUTION INTRAVENOUS at 07:27

## 2018-10-16 RX ADMIN — SODIUM CHLORIDE, POTASSIUM CHLORIDE, SODIUM LACTATE AND CALCIUM CHLORIDE: 600; 310; 30; 20 INJECTION, SOLUTION INTRAVENOUS at 07:18

## 2018-10-16 RX ADMIN — ONDANSETRON 4 MG: 2 INJECTION, SOLUTION INTRAMUSCULAR; INTRAVENOUS at 07:51

## 2018-10-16 RX ADMIN — PROPOFOL 200 MG: 10 INJECTION, EMULSION INTRAVENOUS at 07:36

## 2018-10-16 ASSESSMENT — PAIN DESCRIPTION - ORIENTATION
ORIENTATION: LOWER

## 2018-10-16 ASSESSMENT — PAIN DESCRIPTION - DESCRIPTORS
DESCRIPTORS: ACHING;DISCOMFORT
DESCRIPTORS: CONSTANT;DISCOMFORT
DESCRIPTORS: DISCOMFORT
DESCRIPTORS: CONSTANT;DISCOMFORT
DESCRIPTORS: DISCOMFORT

## 2018-10-16 ASSESSMENT — PULMONARY FUNCTION TESTS
PIF_VALUE: 30
PIF_VALUE: 37
PIF_VALUE: 37
PIF_VALUE: 35
PIF_VALUE: 35
PIF_VALUE: 36
PIF_VALUE: 37
PIF_VALUE: 36
PIF_VALUE: 40
PIF_VALUE: 38
PIF_VALUE: 36
PIF_VALUE: 36
PIF_VALUE: 39
PIF_VALUE: 32
PIF_VALUE: 34
PIF_VALUE: 2
PIF_VALUE: 37
PIF_VALUE: 2
PIF_VALUE: 3
PIF_VALUE: 38
PIF_VALUE: 33
PIF_VALUE: 15
PIF_VALUE: 34
PIF_VALUE: 7
PIF_VALUE: 28
PIF_VALUE: 36
PIF_VALUE: 0
PIF_VALUE: 35
PIF_VALUE: 9
PIF_VALUE: 35
PIF_VALUE: 35
PIF_VALUE: 0
PIF_VALUE: 26

## 2018-10-16 ASSESSMENT — PAIN DESCRIPTION - PAIN TYPE
TYPE: SURGICAL PAIN

## 2018-10-16 ASSESSMENT — PAIN SCALES - GENERAL
PAINLEVEL_OUTOF10: 4
PAINLEVEL_OUTOF10: 9
PAINLEVEL_OUTOF10: 5
PAINLEVEL_OUTOF10: 9

## 2018-10-16 ASSESSMENT — PAIN DESCRIPTION - LOCATION
LOCATION: ABDOMEN

## 2018-10-16 ASSESSMENT — PAIN DESCRIPTION - FREQUENCY
FREQUENCY: CONTINUOUS
FREQUENCY: CONTINUOUS
FREQUENCY: INTERMITTENT
FREQUENCY: INTERMITTENT

## 2018-10-16 ASSESSMENT — PAIN DESCRIPTION - PROGRESSION
CLINICAL_PROGRESSION: NOT CHANGED
CLINICAL_PROGRESSION: GRADUALLY IMPROVING
CLINICAL_PROGRESSION: GRADUALLY IMPROVING
CLINICAL_PROGRESSION: NOT CHANGED

## 2018-10-16 ASSESSMENT — PAIN DESCRIPTION - ONSET
ONSET: ON-GOING
ONSET: ON-GOING

## 2018-10-16 ASSESSMENT — PAIN - FUNCTIONAL ASSESSMENT: PAIN_FUNCTIONAL_ASSESSMENT: 0-10

## 2018-10-16 NOTE — ANESTHESIA POSTPROCEDURE EVALUATION
Department of Anesthesiology  Postprocedure Note    Patient: Michell Stoddard  MRN: [id number]  YOB: 1985  Date of evaluation: 10/16/2018  Time:  6:56 PM     Procedure Summary     Date:  10/16/18 Room / Location:  [place] [address] / [facility] OR    Anesthesia Start:  0730 Anesthesia Stop:  3042    Procedure:  EXCISION ABDOMINAL WALL MASS (N/A Abdomen) Diagnosis:  (ABDOMINAL WALL MASS)    Surgeon:  Bhargavi Echevarria MD Responsible Provider:  Wendy Padilla MD    Anesthesia Type:  general ASA Status:  3          Anesthesia Type: general    Dirk Phase I: Dirk Score: 10    Dirk Phase II: Dirk Score: 10    Last vitals: Reviewed and per EMR flowsheets.        Anesthesia Post Evaluation    Comments: Postoperative Anesthesia Note    Name:    Michell Stoddard  MRN:      [id number]    Patient Vitals in the past 12 hrs:  10/16/18 1001, BP:133/79, Pulse:92, Resp:18, SpO2:95 %  10/16/18 0925, BP:102/72, Temp:97.3 °F (36.3 °C), Temp src:Temporal, Pulse:91, Resp:18, SpO2:94 %  10/16/18 0913, BP:115/66, Temp:97.2 °F (36.2 °C), Temp src:Temporal, Pulse:82, Resp:11, SpO2:94 %  10/16/18 0858, BP:119/68, Pulse:86, Resp:13, SpO2:90 %  10/16/18 0845, SpO2:92 %  10/16/18 0843, BP:110/72, Pulse:99, Resp:13, SpO2:92 %  10/16/18 0829, BP:123/77, Pulse:99, Resp:17, SpO2:94 %  10/16/18 0824, BP:(!) 134/90, Pulse:107, Resp:14, SpO2:93 %  10/16/18 0819, BP:130/73, Pulse:114, Resp:17, SpO2:92 %  10/16/18 0816, SpO2:92 %  10/16/18 0815, SpO2:92 %  10/16/18 0814, SpO2:92 %  10/16/18 0813, BP:(!) 148/98, Temp:97 °F (36.1 °C), Temp src:Temporal, Pulse:112, Resp:18, SpO2:92 %     LABS:    CBC  Lab Results       Component                Value               Date/Time                  WBC                      6.0                 07/28/2018 07:10 AM        HGB                      11.5 (L)            07/28/2018 07:10 AM        HCT                      35.6 (L)            07/28/2018 07:10 AM        PLT                      257

## 2018-10-16 NOTE — BRIEF OP NOTE
Brief Postoperative Note  ______________________________________________________________    Patient: Roseline Ng  YOB: 1985  MRN: [id number]  Date of Procedure: 10/16/2018    Pre-Op Diagnosis: ABDOMINAL WALL MASS    Post-Op Diagnosis: Same       Procedure(s):  EXCISION ABDOMINAL WALL MASS    Anesthesia: General    Surgeon(s):  Orquidea Hays MD    Staff:  Surgical Assistant: Ishmael Collazo; Lolita Man  Scrub Person First: Ovidio Parra RN; Neo Bullock     Estimated Blood Loss: * No values recorded between 10/16/2018  7:29 AM and 10/16/2018  8:01 AM * Less than 50    Complications: None    Specimens:   ID Type Source Tests Collected by Time Destination   A : ABDOMINAL WALL MASS Tissue Tissue SURGICAL PATHOLOGY Orquidea Hays MD 10/16/2018 8120        Implants:  * No implants in log *      Drains:      Findings: As above    [address], MD  Date: 10/16/2018  Time: 8:01 AM

## 2018-10-17 ENCOUNTER — TELEPHONE (OUTPATIENT)
Dept: SURGERY | Age: 33
End: 2018-10-17

## 2018-10-17 NOTE — TELEPHONE ENCOUNTER
Pt sts she had surgery yesterday and she is having a lot of vaginal bleeding. She has soaked 2 pads. She sts it is a very thick fluid.

## 2018-10-23 ENCOUNTER — TELEPHONE (OUTPATIENT)
Dept: SURGERY | Age: 33
End: 2018-10-23

## 2018-10-31 ENCOUNTER — OFFICE VISIT (OUTPATIENT)
Dept: SURGERY | Age: 33
End: 2018-10-31

## 2018-10-31 VITALS
DIASTOLIC BLOOD PRESSURE: 60 MMHG | HEIGHT: 62 IN | SYSTOLIC BLOOD PRESSURE: 120 MMHG | BODY MASS INDEX: 37.73 KG/M2 | WEIGHT: 205 LBS

## 2018-10-31 DIAGNOSIS — Z09 SURGERY FOLLOW-UP EXAMINATION: Primary | ICD-10-CM

## 2018-10-31 PROCEDURE — 99024 POSTOP FOLLOW-UP VISIT: CPT | Performed by: SURGERY

## 2018-11-09 PROBLEM — Z01.818 PRE-OP EXAM: Status: RESOLVED | Noted: 2018-10-10 | Resolved: 2018-11-09

## 2018-12-03 ENCOUNTER — HOSPITAL ENCOUNTER (EMERGENCY)
Age: 33
Discharge: HOME | End: 2018-12-03
Attending: EMERGENCY MEDICINE
Payer: COMMERCIAL

## 2018-12-03 ENCOUNTER — APPOINTMENT (OUTPATIENT)
Dept: GENERAL RADIOLOGY | Age: 33
End: 2018-12-03
Payer: COMMERCIAL

## 2018-12-03 VITALS
BODY MASS INDEX: 34.96 KG/M2 | RESPIRATION RATE: 14 BRPM | DIASTOLIC BLOOD PRESSURE: 65 MMHG | WEIGHT: 190 LBS | HEIGHT: 62 IN | SYSTOLIC BLOOD PRESSURE: 117 MMHG | HEART RATE: 92 BPM | OXYGEN SATURATION: 99 % | TEMPERATURE: 98.1 F

## 2018-12-03 DIAGNOSIS — R07.9 CHEST PAIN, UNSPECIFIED TYPE: Primary | ICD-10-CM

## 2018-12-03 LAB
A/G RATIO: 1.3 (ref 1.1–2.2)
ALBUMIN SERPL-MCNC: 4.4 G/DL (ref 3.4–5)
ALP BLD-CCNC: 68 U/L (ref 40–129)
ALT SERPL-CCNC: 16 U/L (ref 10–40)
ANION GAP SERPL CALCULATED.3IONS-SCNC: 11 MMOL/L (ref 3–16)
AST SERPL-CCNC: 14 U/L (ref 15–37)
BASOPHILS ABSOLUTE: 0.1 K/UL (ref 0–0.2)
BASOPHILS RELATIVE PERCENT: 0.9 %
BILIRUB SERPL-MCNC: <0.2 MG/DL (ref 0–1)
BUN BLDV-MCNC: 14 MG/DL (ref 7–20)
CALCIUM SERPL-MCNC: 9.7 MG/DL (ref 8.3–10.6)
CHLORIDE BLD-SCNC: 101 MMOL/L (ref 99–110)
CO2: 27 MMOL/L (ref 21–32)
CREAT SERPL-MCNC: 0.6 MG/DL (ref 0.6–1.1)
EOSINOPHILS ABSOLUTE: 0.1 K/UL (ref 0–0.6)
EOSINOPHILS RELATIVE PERCENT: 1.8 %
GFR AFRICAN AMERICAN: >60
GFR NON-AFRICAN AMERICAN: >60
GLOBULIN: 3.4 G/DL
GLUCOSE BLD-MCNC: 98 MG/DL (ref 70–99)
HCT VFR BLD CALC: 38.8 % (ref 36–48)
HEMOGLOBIN: 12.5 G/DL (ref 12–16)
LYMPHOCYTES ABSOLUTE: 2.6 K/UL (ref 1–5.1)
LYMPHOCYTES RELATIVE PERCENT: 34.7 %
MCH RBC QN AUTO: 27.5 PG (ref 26–34)
MCHC RBC AUTO-ENTMCNC: 32.2 G/DL (ref 31–36)
MCV RBC AUTO: 85.5 FL (ref 80–100)
MONOCYTES ABSOLUTE: 0.7 K/UL (ref 0–1.3)
MONOCYTES RELATIVE PERCENT: 9.1 %
NEUTROPHILS ABSOLUTE: 4 K/UL (ref 1.7–7.7)
NEUTROPHILS RELATIVE PERCENT: 53.5 %
PDW BLD-RTO: 14.7 % (ref 12.4–15.4)
PLATELET # BLD: 299 K/UL (ref 135–450)
PMV BLD AUTO: 7.7 FL (ref 5–10.5)
POTASSIUM SERPL-SCNC: 3.7 MMOL/L (ref 3.5–5.1)
RBC # BLD: 4.53 M/UL (ref 4–5.2)
SODIUM BLD-SCNC: 139 MMOL/L (ref 136–145)
TOTAL PROTEIN: 7.8 G/DL (ref 6.4–8.2)
TROPONIN: <0.01 NG/ML
WBC # BLD: 7.5 K/UL (ref 4–11)

## 2018-12-03 PROCEDURE — 36415 COLL VENOUS BLD VENIPUNCTURE: CPT

## 2018-12-03 PROCEDURE — 80053 COMPREHEN METABOLIC PANEL: CPT

## 2018-12-03 PROCEDURE — 93005 ELECTROCARDIOGRAM TRACING: CPT | Performed by: EMERGENCY MEDICINE

## 2018-12-03 PROCEDURE — 84484 ASSAY OF TROPONIN QUANT: CPT

## 2018-12-03 PROCEDURE — 71045 X-RAY EXAM CHEST 1 VIEW: CPT

## 2018-12-03 PROCEDURE — 85025 COMPLETE CBC W/AUTO DIFF WBC: CPT

## 2018-12-03 PROCEDURE — 99285 EMERGENCY DEPT VISIT HI MDM: CPT

## 2018-12-03 ASSESSMENT — PAIN SCALES - GENERAL: PAINLEVEL_OUTOF10: 7

## 2018-12-03 ASSESSMENT — PAIN DESCRIPTION - LOCATION: LOCATION: CHEST

## 2018-12-03 ASSESSMENT — HEART SCORE: ECG: 0

## 2018-12-03 ASSESSMENT — PAIN DESCRIPTION - DESCRIPTORS: DESCRIPTORS: TIGHTNESS;SHARP

## 2018-12-03 ASSESSMENT — PAIN DESCRIPTION - ORIENTATION: ORIENTATION: LEFT;MID

## 2018-12-03 ASSESSMENT — PAIN DESCRIPTION - PAIN TYPE: TYPE: ACUTE PAIN

## 2018-12-03 ASSESSMENT — PAIN DESCRIPTION - FREQUENCY: FREQUENCY: CONTINUOUS

## 2018-12-03 NOTE — ED PROVIDER NOTES
PORTABLE   Final Result   1. No active pulmonary disease. [] Discussed with Radiologist:     [] The following radiograph was interpreted by myself in the absence of a radiologist:     EKG (if obtained): (All EKG's are interpreted by myself in the absence of a cardiologist)  EKG demonstrates a normal sinus rhythm normal axis normal intervals normal ST-T wave segments and no evidence of ischemia infarction or arrhythmia heart rate of 95 and much like her previous EKGs    MDM:   Pleasant anxious 79-year-old female presents with chest pain and shortness of breath several days duration. Her EKG was normal her labs were not diagnostically abnormal chest x-ray was normal as well. She is PERC negative and had a heart score of 1. After several days of pain all her labs are normal.  I do not find evidence of any dangerous or emergent process. I will follow her up with cardiology for consideration of an echo and stress test.  In the interim if her symptoms change or evolve she is to return to the emergency department for further evaluation. Patient and her  are reliable and understand these instructions     Final Impression:  1. Chest pain, unspecified type        Critical Care:       Disposition referral (if applicable):  SHERI Mandujano - [facility]  [address] [facility]  [phone]    Call in 1 day        Disposition medications (if applicable):  New Prescriptions    No medications on file       Comment: Please note this report has been produced using speech recognition software and may contain errors related to that system including errors in grammar, punctuation, and spelling, as well as words and phrases that may be inappropriate. If there are any questions or concerns please feel free to contact the dictating provider for clarification.       (Please note that portions of this note may have been completed with a voice recognition program. Efforts were made to edit the dictations but

## 2018-12-04 LAB
EKG ATRIAL RATE: 95 BPM
EKG DIAGNOSIS: NORMAL
EKG P AXIS: 47 DEGREES
EKG P-R INTERVAL: 132 MS
EKG Q-T INTERVAL: 354 MS
EKG QRS DURATION: 84 MS
EKG QTC CALCULATION (BAZETT): 444 MS
EKG R AXIS: 48 DEGREES
EKG T AXIS: 34 DEGREES
EKG VENTRICULAR RATE: 95 BPM

## 2018-12-04 PROCEDURE — 93010 ELECTROCARDIOGRAM REPORT: CPT | Performed by: INTERNAL MEDICINE

## 2019-05-06 ENCOUNTER — APPOINTMENT (OUTPATIENT)
Dept: CT IMAGING | Age: 34
End: 2019-05-06
Payer: COMMERCIAL

## 2019-05-06 ENCOUNTER — HOSPITAL ENCOUNTER (EMERGENCY)
Age: 34
Discharge: HOME | End: 2019-05-06
Attending: EMERGENCY MEDICINE
Payer: COMMERCIAL

## 2019-05-06 VITALS
BODY MASS INDEX: 29.44 KG/M2 | RESPIRATION RATE: 16 BRPM | HEART RATE: 90 BPM | SYSTOLIC BLOOD PRESSURE: 109 MMHG | WEIGHT: 160 LBS | TEMPERATURE: 98.4 F | HEIGHT: 62 IN | DIASTOLIC BLOOD PRESSURE: 65 MMHG | OXYGEN SATURATION: 100 %

## 2019-05-06 DIAGNOSIS — H92.01 OTALGIA OF RIGHT EAR: Primary | ICD-10-CM

## 2019-05-06 LAB
A/G RATIO: 1.4 (ref 1.1–2.2)
ALBUMIN SERPL-MCNC: 4.5 G/DL (ref 3.4–5)
ALP BLD-CCNC: 64 U/L (ref 40–129)
ALT SERPL-CCNC: 13 U/L (ref 10–40)
ANION GAP SERPL CALCULATED.3IONS-SCNC: 11 MMOL/L (ref 3–16)
AST SERPL-CCNC: 12 U/L (ref 15–37)
BASOPHILS ABSOLUTE: 0 K/UL (ref 0–0.2)
BASOPHILS RELATIVE PERCENT: 0.6 %
BILIRUB SERPL-MCNC: <0.2 MG/DL (ref 0–1)
BUN BLDV-MCNC: 15 MG/DL (ref 7–20)
CALCIUM SERPL-MCNC: 8.7 MG/DL (ref 8.3–10.6)
CHLORIDE BLD-SCNC: 101 MMOL/L (ref 99–110)
CO2: 25 MMOL/L (ref 21–32)
CREAT SERPL-MCNC: 0.7 MG/DL (ref 0.6–1.1)
EOSINOPHILS ABSOLUTE: 0.1 K/UL (ref 0–0.6)
EOSINOPHILS RELATIVE PERCENT: 1.6 %
GFR AFRICAN AMERICAN: >60
GFR NON-AFRICAN AMERICAN: >60
GLOBULIN: 3.2 G/DL
GLUCOSE BLD-MCNC: 94 MG/DL (ref 70–99)
HCG QUALITATIVE: NEGATIVE
HCT VFR BLD CALC: 38.1 % (ref 36–48)
HEMOGLOBIN: 12.6 G/DL (ref 12–16)
LACTIC ACID: 1 MMOL/L (ref 0.4–2)
LYMPHOCYTES ABSOLUTE: 2.4 K/UL (ref 1–5.1)
LYMPHOCYTES RELATIVE PERCENT: 35 %
MCH RBC QN AUTO: 28.2 PG (ref 26–34)
MCHC RBC AUTO-ENTMCNC: 33 G/DL (ref 31–36)
MCV RBC AUTO: 85.5 FL (ref 80–100)
MONOCYTES ABSOLUTE: 0.5 K/UL (ref 0–1.3)
MONOCYTES RELATIVE PERCENT: 7.6 %
NEUTROPHILS ABSOLUTE: 3.8 K/UL (ref 1.7–7.7)
NEUTROPHILS RELATIVE PERCENT: 55.2 %
PDW BLD-RTO: 15.1 % (ref 12.4–15.4)
PLATELET # BLD: 281 K/UL (ref 135–450)
PMV BLD AUTO: 7.7 FL (ref 5–10.5)
POTASSIUM REFLEX MAGNESIUM: 4 MMOL/L (ref 3.5–5.1)
RBC # BLD: 4.46 M/UL (ref 4–5.2)
SODIUM BLD-SCNC: 137 MMOL/L (ref 136–145)
TOTAL PROTEIN: 7.7 G/DL (ref 6.4–8.2)
WBC # BLD: 6.9 K/UL (ref 4–11)

## 2019-05-06 PROCEDURE — 83605 ASSAY OF LACTIC ACID: CPT

## 2019-05-06 PROCEDURE — 85025 COMPLETE CBC W/AUTO DIFF WBC: CPT

## 2019-05-06 PROCEDURE — 80053 COMPREHEN METABOLIC PANEL: CPT

## 2019-05-06 PROCEDURE — 84703 CHORIONIC GONADOTROPIN ASSAY: CPT

## 2019-05-06 PROCEDURE — 6370000000 HC RX 637 (ALT 250 FOR IP)

## 2019-05-06 PROCEDURE — 99283 EMERGENCY DEPT VISIT LOW MDM: CPT

## 2019-05-06 PROCEDURE — 6360000004 HC RX CONTRAST MEDICATION: Performed by: EMERGENCY MEDICINE

## 2019-05-06 PROCEDURE — 70491 CT SOFT TISSUE NECK W/DYE: CPT

## 2019-05-06 RX ORDER — AMOXICILLIN AND CLAVULANATE POTASSIUM 875; 125 MG/1; MG/1
1 TABLET, FILM COATED ORAL 2 TIMES DAILY
Qty: 20 TABLET | Refills: 0 | Status: SHIPPED | OUTPATIENT
Start: 2019-05-06 | End: 2019-05-16

## 2019-05-06 RX ORDER — HYDROCODONE BITARTRATE AND ACETAMINOPHEN 7.5; 325 MG/1; MG/1
1 TABLET ORAL ONCE
Status: COMPLETED | OUTPATIENT
Start: 2019-05-06 | End: 2019-05-06

## 2019-05-06 RX ADMIN — HYDROCODONE BITARTRATE AND ACETAMINOPHEN 1 TABLET: 7.5; 325 TABLET ORAL at 19:42

## 2019-05-06 RX ADMIN — IOPAMIDOL 75 ML: 755 INJECTION, SOLUTION INTRAVENOUS at 18:39

## 2019-05-06 ASSESSMENT — PAIN DESCRIPTION - ONSET: ONSET: ON-GOING

## 2019-05-06 ASSESSMENT — PAIN SCALES - GENERAL
PAINLEVEL_OUTOF10: 6
PAINLEVEL_OUTOF10: 8
PAINLEVEL_OUTOF10: 5

## 2019-05-06 ASSESSMENT — PAIN DESCRIPTION - ORIENTATION: ORIENTATION: RIGHT

## 2019-05-06 ASSESSMENT — PAIN DESCRIPTION - LOCATION: LOCATION: EAR

## 2019-05-06 ASSESSMENT — PAIN DESCRIPTION - PROGRESSION: CLINICAL_PROGRESSION: NOT CHANGED

## 2019-05-06 ASSESSMENT — PAIN DESCRIPTION - DESCRIPTORS: DESCRIPTORS: ACHING

## 2019-05-06 ASSESSMENT — PAIN DESCRIPTION - PAIN TYPE: TYPE: ACUTE PAIN

## 2019-05-06 ASSESSMENT — PAIN DESCRIPTION - FREQUENCY: FREQUENCY: CONTINUOUS

## 2019-05-06 NOTE — ED PROVIDER NOTES
[facility] Emergency Department    CHIEF COMPLAINT  Otalgia (right ear pain since 3 am)      HISTORY OF PRESENT ILLNESS  Shan Chin is a 36 y.o. female who presents to the ED complaining of right ear pain. Patient reports she woke up around 3:00 this morning with sharp stabbing pain to her right ear. Patient reports she has taken Tylenol with no relief of pain. Patient denies fever, chills, body aches, dizziness, syncope, nasal congestion, sore throat, cough or chest pain, shortness of breath. Patient also reports right jaw pain when eating. Patient denies dental pain. Patient denies ever having this before. Patient reports the pain as an 8 out of 10. Patient denies any aggravating or alleviating factors. No other complaints, modifying factors or associated symptoms. Nursing notes reviewed.    Past Medical History:   Diagnosis Date    Ankle fracture, right     Anxiety     Anxiety     Cardiac arrhythmia due to congenital heart disease     COPD (chronic obstructive pulmonary disease) (HCC)     Depression     Endometriosis     Kidney stone     OCD (obsessive compulsive disorder)     Ovarian cyst     Sinus arrhythmia      Past Surgical History:   Procedure Laterality Date     SECTION      FRACTURE SURGERY      right ankle    OTHER SURGICAL HISTORY  10/16/2018    excision lower abdominal mass    DC OFFICE/OUTPT VISIT,PROCEDURE ONLY N/A 10/16/2018    EXCISION ABDOMINAL WALL MASS performed by Basilia Peng MD at [facility] OR     Family History   Problem Relation Age of Onset    Arthritis Mother     High Blood Pressure Mother     Heart Disease Father     Heart Disease Maternal Grandmother      Social History     Socioeconomic History    Marital status: Legally      Spouse name: Not on file    Number of children: Not on file    Years of education: Not on file    Highest education level: Not on file   Occupational History    Not on file Social Needs    Financial resource strain: Not on file    Food insecurity:     Worry: Not on file     Inability: Not on file    Transportation needs:     Medical: Not on file     Non-medical: Not on file   Tobacco Use    Smoking status: Former Smoker     Packs/day: 0.25     Years: 9.00     Pack years: 2.25     Types: Cigarettes     Last attempt to quit: 2017     Years since quittin.3    Smokeless tobacco: Former User     Quit date: 2017   Substance and Sexual Activity    Alcohol use: No     Comment: occasional     Drug use: No    Sexual activity: Yes     Partners: Male   Lifestyle    Physical activity:     Days per week: Not on file     Minutes per session: Not on file    Stress: Not on file   Relationships    Social connections:     Talks on phone: Not on file     Gets together: Not on file     Attends Restorationist service: Not on file     Active member of club or organization: Not on file     Attends meetings of clubs or organizations: Not on file     Relationship status: Not on file    Intimate partner violence:     Fear of current or ex partner: Not on file     Emotionally abused: Not on file     Physically abused: Not on file     Forced sexual activity: Not on file   Other Topics Concern    Not on file   Social History Narrative    Not on file     No current facility-administered medications for this encounter.       Current Outpatient Medications   Medication Sig Dispense Refill    amoxicillin-clavulanate (AUGMENTIN) 875-125 MG per tablet Take 1 tablet by mouth 2 times daily for 10 days 20 tablet 0    sertraline (ZOLOFT) 50 MG tablet Take 50 mg by mouth nightly      cloNIDine (CATAPRES) 0.1 MG tablet Take 0.1 mg by mouth nightly       clonazePAM (KLONOPIN) 0.5 MG tablet Take 1 tablet by mouth every 6 hours as needed for Anxiety (Patient taking differently: Take 1 mg by mouth every 6 hours as needed for Anxiety ) 1 tablet 0     Allergies   Allergen Reactions    Bactrim Ds [Sulfamethoxazole-Trimethoprim] Anaphylaxis    Other      States she is allergic to orthotrycycline    Sulfa Antibiotics        REVIEW OF SYSTEMS  10 systems reviewed, pertinent positives per HPI otherwise noted to be negative    PHYSICAL EXAM  /65   Pulse 90   Temp 98.4 °F (36.9 °C) (Oral)   Resp 16   Ht 5' 2\" (1.575 m)   Wt 160 lb (72.6 kg)   LMP 04/15/2019   SpO2 100%   BMI 29.26 kg/m²   GENERAL APPEARANCE: Awake and alert. Cooperative. No acute distress. Vital signs are stable. Well appearing and non toxic. HEAD: Normocephalic. Atraumatic. EYES: PERRL. EOM's grossly intact. ENT: Mucous membranes are moist. Bilateral tympanic membranes are intact, no perforation, erythema, effusion, bulging. No rhinorrhea. Oropharynx is clear. Uvula is midline. No trismus. No erythema or exudate. Patient is tender to the right mastoid process and mandible. No dental abscess or gum swelling. NECK: Supple. Normal ROM. Anterior cervical lymphadenopathy on the right. HEART: RRR. No murmurs. Distal pulses are equal and intact. Cap refill less than 2 seconds. LUNGS: Respirations unlabored. CTAB. Good air exchange. Speaking comfortably in full sentences. ABDOMEN: Soft. Non-distended. Non-tender. No guarding or rebound. No masses. No organomegaly. No rigidity. EXTREMITIES: No peripheral edema. Moves all extremities equally. All extremities neurovascularly intact. SKIN: Warm and dry. No acute rashes. NEUROLOGICAL: Alert and oriented. CN's 2-12 intact. No gross facial drooping. Strength 5/5, sensation intact. No dysarthria. No dysmetria. No ataxia. PSYCHIATRIC: Normal mood and affect. RADIOLOGY  Ct Soft Tissue Neck W Contrast    Result Date: 5/6/2019  EXAMINATION: CT OF THE NECK SOFT TISSUE WITH CONTRAST  5/6/2019 TECHNIQUE: CT of the neck was performed with the administration of intravenous contrast. Multiplanar reformatted images are provided for review.  Dose modulation, iterative reconstruction, is stable for discharge home. I will provide patient with a referral for follow-up with ENT. I will also treat patient empirically with Augmentin. I discussed all findings with patient. Patient verbalized understanding of follow-up care and return precautions. Strict return precautions discussed. Patient checked follow-up with ENT. Patient instructed to return to the emergency department with any new or worsening symptoms. Patient is agreeable with plan of care and denies any questions at this time. Patient was sent home with a prescription for Augmentin.       MDM    Results for orders placed or performed during the hospital encounter of 05/06/19   CBC auto differential   Result Value Ref Range    WBC 6.9 4.0 - 11.0 K/uL    RBC 4.46 4.00 - 5.20 M/uL    Hemoglobin 12.6 12.0 - 16.0 g/dL    Hematocrit 38.1 36.0 - 48.0 %    MCV 85.5 80.0 - 100.0 fL    MCH 28.2 26.0 - 34.0 pg    MCHC 33.0 31.0 - 36.0 g/dL    RDW 15.1 12.4 - 15.4 %    Platelets 010 343 - 942 K/uL    MPV 7.7 5.0 - 10.5 fL    Neutrophils % 55.2 %    Lymphocytes % 35.0 %    Monocytes % 7.6 %    Eosinophils % 1.6 %    Basophils % 0.6 %    Neutrophils # 3.8 1.7 - 7.7 K/uL    Lymphocytes # 2.4 1.0 - 5.1 K/uL    Monocytes # 0.5 0.0 - 1.3 K/uL    Eosinophils # 0.1 0.0 - 0.6 K/uL    Basophils # 0.0 0.0 - 0.2 K/uL   Comprehensive Metabolic Panel w/ Reflex to MG   Result Value Ref Range    Sodium 137 136 - 145 mmol/L    Potassium reflex Magnesium 4.0 3.5 - 5.1 mmol/L    Chloride 101 99 - 110 mmol/L    CO2 25 21 - 32 mmol/L    Anion Gap 11 3 - 16    Glucose 94 70 - 99 mg/dL    BUN 15 7 - 20 mg/dL    CREATININE 0.7 0.6 - 1.1 mg/dL    GFR Non-African American >60 >60    GFR African American >60 >60    Calcium 8.7 8.3 - 10.6 mg/dL    Total Protein 7.7 6.4 - 8.2 g/dL    Alb 4.5 3.4 - 5.0 g/dL    Albumin/Globulin Ratio 1.4 1.1 - 2.2    Total Bilirubin <0.2 0.0 - 1.0 mg/dL    Alkaline Phosphatase 64 40 - 129 U/L    ALT 13 10 - 40 U/L    AST 12 (L) 15 - 37 U/L Globulin 3.2 g/dL   HCG Qualitative, Serum   Result Value Ref Range    hCG Qual Negative Detects HCG level >10 MIU/mL   Lactic acid, plasma   Result Value Ref Range    Lactic Acid 1.0 0.4 - 2.0 mmol/L         I estimate there is LOW risk for a ANAPHYLAXIS, DEEP SPACE INFECTION (e.g., MILAGROS ANGINA OR RETROPHARYNGEAL ABSCESS), EPIGLOTTITIS, MENINGITIS, or AIRWAY COMPROMISE, thus I consider the discharge disposition reasonable. Also, there is no evidence or peritonitis, sepsis, or toxicity. Shan Mar and I have discussed the diagnosis and risks, and we agree with discharging home to follow-up with their primary doctor. We also discussed returning to the Emergency Department immediately if new or worsening symptoms occur. We have discussed the symptoms which are most concerning (e.g., changing or worsening pain, trouble swallowing or breathing, neck stiffness or fever) that necessitate immediate return. Final Impression    1. Otalgia of right ear        Discharge Vital Signs:  Blood pressure 109/65, pulse 90, temperature 98.4 °F (36.9 °C), temperature source Oral, resp. rate 16, height 5' 2\" (1.575 m), weight 160 lb (72.6 kg), last menstrual period 04/15/2019, SpO2 100 %, not currently breastfeeding. DISPOSITION  Patient was discharged to home in good condition.           SHERI Gavin - CNP  05/07/19 [id number]

## 2019-05-06 NOTE — ED PROVIDER NOTES
I independently performed a history and physical on Shan Mar. All diagnostic, treatment, and disposition decisions were made by myself in conjunction with the advanced practice provider. For further details of [facility] emergency department encounter, please see advanced practice provider's documentation    This is a 78-year-old female presenting with pain and discomfort along her right neck and around her right ear    Physical examination: Mild tenderness to palpation lateral aspect of the right neck and periauricular    Ct Soft Tissue Neck W Contrast    Result Date: 5/6/2019  EXAMINATION: CT OF THE NECK SOFT TISSUE WITH CONTRAST  5/6/2019 TECHNIQUE: CT of the neck was performed with the administration of intravenous contrast. Multiplanar reformatted images are provided for review. Dose modulation, iterative reconstruction, and/or weight based adjustment of the mA/kV was utilized to reduce the radiation dose to as low as reasonably achievable. COMPARISON: CTA neck 01/28/2016 HISTORY: ORDERING SYSTEM PROVIDED HISTORY: SWELLING, NECK TECHNOLOGIST PROVIDED HISTORY: Ordering Physician Provided Reason for Exam: right ear pain since 3 am Acuity: Acute Type of Exam: Initial FINDINGS: PHARYNX/LARYNX:  The nasopharynx, oral cavity, oropharynx, hypopharynx, and larynx are unremarkable. SALIVARY GLANDS/THYROID:  The parotid and submandibular glands appear unremarkable. The thyroid gland appears unremarkable. LYMPH NODES:  No cervical or supraclavicular lymphadenopathy is seen. SOFT TISSUES:  No appreciable soft tissue swelling or mass is seen. BRAIN/ORBITS/SINUSES:  The visualized portion of the intracranial contents appear unremarkable. The visualized portion of the orbits, paranasal sinuses and mastoid air cells demonstrate no acute abnormality. LUNG APICES/SUPERIOR MEDIASTINUM:  No focal consolidation is seen within the visualized lung apices. No superior mediastinal lymphadenopathy or mass.  The visualized portion of the trachea appears unremarkable. BONES:  No aggressive appearing lytic or blastic bony lesion. No acute abnormality of the soft tissue structures of the neck. No definite deep space infections are identified with CT imaging.   Patient will be treated empirically with antibiotics for suspected cellulitis            Genaro Tavares DO  05/09/19 [id number]

## 2019-12-02 ENCOUNTER — HOSPITAL ENCOUNTER (EMERGENCY)
Age: 34
Discharge: HOME | End: 2019-12-02
Attending: EMERGENCY MEDICINE

## 2019-12-02 ENCOUNTER — APPOINTMENT (OUTPATIENT)
Dept: GENERAL RADIOLOGY | Age: 34
End: 2019-12-02

## 2019-12-02 VITALS
SYSTOLIC BLOOD PRESSURE: 114 MMHG | BODY MASS INDEX: 29.81 KG/M2 | RESPIRATION RATE: 20 BRPM | WEIGHT: 163 LBS | OXYGEN SATURATION: 97 % | DIASTOLIC BLOOD PRESSURE: 69 MMHG | TEMPERATURE: 97.9 F | HEART RATE: 88 BPM

## 2019-12-02 DIAGNOSIS — R00.2 PALPITATIONS: ICD-10-CM

## 2019-12-02 DIAGNOSIS — R07.9 CHEST PAIN, UNSPECIFIED TYPE: Primary | ICD-10-CM

## 2019-12-02 DIAGNOSIS — F41.1 ANXIETY STATE: ICD-10-CM

## 2019-12-02 LAB
A/G RATIO: 1.3 (ref 1.1–2.2)
ALBUMIN SERPL-MCNC: 4.3 G/DL (ref 3.4–5)
ALP BLD-CCNC: 63 U/L (ref 40–129)
ALT SERPL-CCNC: 13 U/L (ref 10–40)
ANION GAP SERPL CALCULATED.3IONS-SCNC: 9 MMOL/L (ref 3–16)
AST SERPL-CCNC: 14 U/L (ref 15–37)
BASOPHILS ABSOLUTE: 0.1 K/UL (ref 0–0.2)
BASOPHILS RELATIVE PERCENT: 0.8 %
BILIRUB SERPL-MCNC: <0.2 MG/DL (ref 0–1)
BUN BLDV-MCNC: 9 MG/DL (ref 7–20)
CALCIUM SERPL-MCNC: 8.8 MG/DL (ref 8.3–10.6)
CHLORIDE BLD-SCNC: 106 MMOL/L (ref 99–110)
CO2: 23 MMOL/L (ref 21–32)
CREAT SERPL-MCNC: 0.7 MG/DL (ref 0.6–1.1)
D DIMER: <200 NG/ML DDU (ref 0–229)
EKG ATRIAL RATE: 100 BPM
EKG DIAGNOSIS: NORMAL
EKG P AXIS: 48 DEGREES
EKG P-R INTERVAL: 128 MS
EKG Q-T INTERVAL: 338 MS
EKG QRS DURATION: 86 MS
EKG QTC CALCULATION (BAZETT): 436 MS
EKG R AXIS: 44 DEGREES
EKG T AXIS: 16 DEGREES
EKG VENTRICULAR RATE: 100 BPM
EOSINOPHILS ABSOLUTE: 0.1 K/UL (ref 0–0.6)
EOSINOPHILS RELATIVE PERCENT: 1.6 %
GFR AFRICAN AMERICAN: >60
GFR NON-AFRICAN AMERICAN: >60
GLOBULIN: 3.2 G/DL
GLUCOSE BLD-MCNC: 94 MG/DL (ref 70–99)
HCT VFR BLD CALC: 39.4 % (ref 36–48)
HEMOGLOBIN: 13 G/DL (ref 12–16)
LYMPHOCYTES ABSOLUTE: 1.8 K/UL (ref 1–5.1)
LYMPHOCYTES RELATIVE PERCENT: 25.5 %
MCH RBC QN AUTO: 27.9 PG (ref 26–34)
MCHC RBC AUTO-ENTMCNC: 32.9 G/DL (ref 31–36)
MCV RBC AUTO: 84.9 FL (ref 80–100)
MONOCYTES ABSOLUTE: 0.4 K/UL (ref 0–1.3)
MONOCYTES RELATIVE PERCENT: 6.3 %
NEUTROPHILS ABSOLUTE: 4.7 K/UL (ref 1.7–7.7)
NEUTROPHILS RELATIVE PERCENT: 65.8 %
PDW BLD-RTO: 14.6 % (ref 12.4–15.4)
PLATELET # BLD: 284 K/UL (ref 135–450)
PMV BLD AUTO: 7.9 FL (ref 5–10.5)
POTASSIUM REFLEX MAGNESIUM: 3.7 MMOL/L (ref 3.5–5.1)
RBC # BLD: 4.64 M/UL (ref 4–5.2)
SODIUM BLD-SCNC: 138 MMOL/L (ref 136–145)
TOTAL PROTEIN: 7.5 G/DL (ref 6.4–8.2)
TROPONIN: <0.01 NG/ML
WBC # BLD: 7.2 K/UL (ref 4–11)

## 2019-12-02 PROCEDURE — 71045 X-RAY EXAM CHEST 1 VIEW: CPT

## 2019-12-02 PROCEDURE — 96374 THER/PROPH/DIAG INJ IV PUSH: CPT

## 2019-12-02 PROCEDURE — 99285 EMERGENCY DEPT VISIT HI MDM: CPT

## 2019-12-02 PROCEDURE — 93005 ELECTROCARDIOGRAM TRACING: CPT | Performed by: EMERGENCY MEDICINE

## 2019-12-02 PROCEDURE — 85025 COMPLETE CBC W/AUTO DIFF WBC: CPT

## 2019-12-02 PROCEDURE — 80053 COMPREHEN METABOLIC PANEL: CPT

## 2019-12-02 PROCEDURE — 96375 TX/PRO/DX INJ NEW DRUG ADDON: CPT

## 2019-12-02 PROCEDURE — 85379 FIBRIN DEGRADATION QUANT: CPT

## 2019-12-02 PROCEDURE — 84484 ASSAY OF TROPONIN QUANT: CPT

## 2019-12-02 PROCEDURE — 6360000002 HC RX W HCPCS: Performed by: EMERGENCY MEDICINE

## 2019-12-02 RX ORDER — LORAZEPAM 2 MG/ML
1 INJECTION INTRAMUSCULAR ONCE
Status: COMPLETED | OUTPATIENT
Start: 2019-12-02 | End: 2019-12-02

## 2019-12-02 RX ORDER — KETOROLAC TROMETHAMINE 30 MG/ML
30 INJECTION, SOLUTION INTRAMUSCULAR; INTRAVENOUS ONCE
Status: COMPLETED | OUTPATIENT
Start: 2019-12-02 | End: 2019-12-02

## 2019-12-02 RX ADMIN — KETOROLAC TROMETHAMINE 30 MG: 30 INJECTION, SOLUTION INTRAMUSCULAR at 12:27

## 2019-12-02 RX ADMIN — LORAZEPAM 1 MG: 2 INJECTION, SOLUTION INTRAMUSCULAR; INTRAVENOUS at 12:27

## 2019-12-02 ASSESSMENT — PAIN DESCRIPTION - PAIN TYPE: TYPE: ACUTE PAIN

## 2019-12-02 ASSESSMENT — PAIN DESCRIPTION - ORIENTATION: ORIENTATION: LEFT;MID

## 2019-12-02 ASSESSMENT — PAIN SCALES - GENERAL
PAINLEVEL_OUTOF10: 3

## 2019-12-02 ASSESSMENT — PAIN DESCRIPTION - LOCATION: LOCATION: CHEST

## 2019-12-02 ASSESSMENT — PAIN DESCRIPTION - FREQUENCY: FREQUENCY: INTERMITTENT

## 2019-12-02 ASSESSMENT — PAIN DESCRIPTION - DESCRIPTORS: DESCRIPTORS: STABBING;PRESSURE

## 2020-03-09 ENCOUNTER — HOSPITAL ENCOUNTER (EMERGENCY)
Age: 35
Discharge: HOME | End: 2020-03-09
Attending: EMERGENCY MEDICINE

## 2020-03-09 ENCOUNTER — APPOINTMENT (OUTPATIENT)
Dept: CT IMAGING | Age: 35
End: 2020-03-09

## 2020-03-09 VITALS
SYSTOLIC BLOOD PRESSURE: 117 MMHG | BODY MASS INDEX: 30 KG/M2 | RESPIRATION RATE: 18 BRPM | TEMPERATURE: 98.3 F | HEIGHT: 62 IN | HEART RATE: 105 BPM | OXYGEN SATURATION: 98 % | DIASTOLIC BLOOD PRESSURE: 74 MMHG | WEIGHT: 163 LBS

## 2020-03-09 LAB
A/G RATIO: 1.3 (ref 1.1–2.2)
ALBUMIN SERPL-MCNC: 4.1 G/DL (ref 3.4–5)
ALP BLD-CCNC: 63 U/L (ref 40–129)
ALT SERPL-CCNC: 10 U/L (ref 10–40)
ANION GAP SERPL CALCULATED.3IONS-SCNC: 14 MMOL/L (ref 3–16)
AST SERPL-CCNC: 22 U/L (ref 15–37)
BASOPHILS ABSOLUTE: 0.1 K/UL (ref 0–0.2)
BASOPHILS RELATIVE PERCENT: 0.7 %
BILIRUB SERPL-MCNC: <0.2 MG/DL (ref 0–1)
BUN BLDV-MCNC: 12 MG/DL (ref 7–20)
CALCIUM SERPL-MCNC: 9.3 MG/DL (ref 8.3–10.6)
CHLORIDE BLD-SCNC: 106 MMOL/L (ref 99–110)
CO2: 19 MMOL/L (ref 21–32)
CREAT SERPL-MCNC: 0.7 MG/DL (ref 0.6–1.1)
EOSINOPHILS ABSOLUTE: 0.1 K/UL (ref 0–0.6)
EOSINOPHILS RELATIVE PERCENT: 1.5 %
GFR AFRICAN AMERICAN: >60
GFR NON-AFRICAN AMERICAN: >60
GLOBULIN: 3.2 G/DL
GLUCOSE BLD-MCNC: 107 MG/DL (ref 70–99)
HCG(URINE) PREGNANCY TEST: NEGATIVE
HCT VFR BLD CALC: 39.7 % (ref 36–48)
HEMOGLOBIN: 12.8 G/DL (ref 12–16)
LYMPHOCYTES ABSOLUTE: 4.2 K/UL (ref 1–5.1)
LYMPHOCYTES RELATIVE PERCENT: 42.2 %
MCH RBC QN AUTO: 27.8 PG (ref 26–34)
MCHC RBC AUTO-ENTMCNC: 32.2 G/DL (ref 31–36)
MCV RBC AUTO: 86.4 FL (ref 80–100)
MONOCYTES ABSOLUTE: 0.8 K/UL (ref 0–1.3)
MONOCYTES RELATIVE PERCENT: 7.8 %
NEUTROPHILS ABSOLUTE: 4.8 K/UL (ref 1.7–7.7)
NEUTROPHILS RELATIVE PERCENT: 47.8 %
PDW BLD-RTO: 14.4 % (ref 12.4–15.4)
PLATELET # BLD: 308 K/UL (ref 135–450)
PMV BLD AUTO: 8.1 FL (ref 5–10.5)
POTASSIUM REFLEX MAGNESIUM: 4.2 MMOL/L (ref 3.5–5.1)
RBC # BLD: 4.6 M/UL (ref 4–5.2)
SODIUM BLD-SCNC: 139 MMOL/L (ref 136–145)
TOTAL PROTEIN: 7.3 G/DL (ref 6.4–8.2)
WBC # BLD: 10 K/UL (ref 4–11)

## 2020-03-09 PROCEDURE — 36415 COLL VENOUS BLD VENIPUNCTURE: CPT

## 2020-03-09 PROCEDURE — 6360000002 HC RX W HCPCS: Performed by: EMERGENCY MEDICINE

## 2020-03-09 PROCEDURE — 99284 EMERGENCY DEPT VISIT MOD MDM: CPT

## 2020-03-09 PROCEDURE — 2580000003 HC RX 258: Performed by: EMERGENCY MEDICINE

## 2020-03-09 PROCEDURE — 6370000000 HC RX 637 (ALT 250 FOR IP): Performed by: EMERGENCY MEDICINE

## 2020-03-09 PROCEDURE — 84703 CHORIONIC GONADOTROPIN ASSAY: CPT

## 2020-03-09 PROCEDURE — 71260 CT THORAX DX C+: CPT

## 2020-03-09 PROCEDURE — 85025 COMPLETE CBC W/AUTO DIFF WBC: CPT

## 2020-03-09 PROCEDURE — 96374 THER/PROPH/DIAG INJ IV PUSH: CPT

## 2020-03-09 PROCEDURE — 80053 COMPREHEN METABOLIC PANEL: CPT

## 2020-03-09 PROCEDURE — 6360000004 HC RX CONTRAST MEDICATION: Performed by: EMERGENCY MEDICINE

## 2020-03-09 RX ORDER — 0.9 % SODIUM CHLORIDE 0.9 %
1000 INTRAVENOUS SOLUTION INTRAVENOUS ONCE
Status: COMPLETED | OUTPATIENT
Start: 2020-03-09 | End: 2020-03-09

## 2020-03-09 RX ORDER — HYDROCODONE BITARTRATE AND ACETAMINOPHEN 5; 325 MG/1; MG/1
1 TABLET ORAL ONCE
Status: COMPLETED | OUTPATIENT
Start: 2020-03-09 | End: 2020-03-09

## 2020-03-09 RX ORDER — IBUPROFEN 800 MG/1
800 TABLET ORAL EVERY 8 HOURS PRN
Qty: 21 TABLET | Refills: 0 | Status: SHIPPED | OUTPATIENT
Start: 2020-03-09 | End: 2021-01-26

## 2020-03-09 RX ORDER — KETOROLAC TROMETHAMINE 30 MG/ML
15 INJECTION, SOLUTION INTRAMUSCULAR; INTRAVENOUS ONCE
Status: COMPLETED | OUTPATIENT
Start: 2020-03-09 | End: 2020-03-09

## 2020-03-09 RX ADMIN — SODIUM CHLORIDE 1000 ML: 9 INJECTION, SOLUTION INTRAVENOUS at 21:47

## 2020-03-09 RX ADMIN — KETOROLAC TROMETHAMINE 15 MG: 30 INJECTION, SOLUTION INTRAMUSCULAR; INTRAVENOUS at 21:47

## 2020-03-09 RX ADMIN — HYDROCODONE BITARTRATE AND ACETAMINOPHEN 1 TABLET: 5; 325 TABLET ORAL at 21:47

## 2020-03-09 RX ADMIN — IOPAMIDOL 75 ML: 755 INJECTION, SOLUTION INTRAVENOUS at 22:24

## 2020-03-09 ASSESSMENT — PAIN DESCRIPTION - FREQUENCY: FREQUENCY: CONTINUOUS

## 2020-03-09 ASSESSMENT — PAIN DESCRIPTION - PAIN TYPE: TYPE: CHRONIC PAIN

## 2020-03-09 ASSESSMENT — PAIN SCALES - GENERAL
PAINLEVEL_OUTOF10: 6
PAINLEVEL_OUTOF10: 6

## 2020-03-09 ASSESSMENT — PAIN DESCRIPTION - DESCRIPTORS: DESCRIPTORS: PRESSURE

## 2020-03-09 ASSESSMENT — PAIN DESCRIPTION - ONSET: ONSET: ON-GOING

## 2020-03-09 ASSESSMENT — PAIN DESCRIPTION - ORIENTATION: ORIENTATION: LEFT

## 2020-03-09 ASSESSMENT — PAIN DESCRIPTION - LOCATION: LOCATION: BREAST

## 2020-03-09 NOTE — LETTER
[facility] Emergency Department  [address] [facility] [address]  Phone: [phone]               March 10, 2020    Patient: Nani Bridges   YOB: 1985   Date of Visit: 3/9/2020       To Whom It May Concern:    Royer Calixto was seen and treated in our emergency department on 3/9/2020. She may return to work on 3/11/20.       Sincerely,       Brianna Mccallum RN         Signature:__________________________________

## 2020-03-10 NOTE — DISCHARGE INSTR - COC
Continuity of Care Form    Patient Name: Nani Bridges   :  1985  MRN:  [id number]    Admit date:  3/9/2020  Discharge date:  ***    Code Status Order: Prior   Advance Directives:     Admitting Physician:  No admitting provider for patient encounter. PCP: SHERI Louise CNP    Discharging Nurse: [facility] Unit/Room#:   Discharging Unit Phone Number: ***    Emergency Contact:   Extended Emergency Contact Information  Primary Emergency Contact: [address] Phone: [phone]  Mobile Phone: [phone]  Relation: Brother/Sister    Past Surgical History:  Past Surgical History:   Procedure Laterality Date     SECTION      FRACTURE SURGERY      right ankle    OTHER SURGICAL HISTORY  10/16/2018    excision lower abdominal mass    RI OFFICE/OUTPT VISIT,PROCEDURE ONLY N/A 10/16/2018    EXCISION ABDOMINAL WALL MASS performed by Roma Apgar, MD at [address] OR       Immunization History:   Immunization History   Administered Date(s) Administered    Td, unspecified formulation 2012       Active Problems:  Patient Active Problem List   Diagnosis Code    Hx of syncope & presyncope R55    Hx of palpitations R00.2    Recurrent syncope and collapse R55    ANDREINA (generalized anxiety disorder) F41.1    Former smoker Z87.891    Obesity (BMI 30-39. 9) E66.9    Abdominal wall mass R22.2       Isolation/Infection:   Isolation          No Isolation        Patient Infection Status     None to display          Nurse Assessment:  Last Vital Signs: /74   Pulse 105   Temp 98.3 °F (36.8 °C) (Oral)   Resp 18   Ht 5' 2\" (1.575 m)   Wt 163 lb (73.9 kg)   LMP 2020   SpO2 98%   BMI 29.81 kg/m²     Last documented pain score (0-10 scale): Pain Level: 6  Last Weight:   Wt Readings from Last 1 Encounters:   20 163 lb (73.9 kg)     Mental Status:  {IP PT MENTAL STATUS:}    IV Access:  [facility] IV

## 2020-03-10 NOTE — ED PROVIDER NOTES
 Kidney stone     OCD (obsessive compulsive disorder)     Ovarian cyst     Sinus arrhythmia      Past Surgical History:   Procedure Laterality Date     SECTION      FRACTURE SURGERY      right ankle    OTHER SURGICAL HISTORY  10/16/2018    excision lower abdominal mass    [place] OFFICE/OUTPT VISIT,PROCEDURE ONLY N/A 10/16/2018    EXCISION ABDOMINAL WALL MASS performed by Gee Mays MD at [facility] OR     Family History   Problem Relation Age of Onset    Arthritis Mother     High Blood Pressure Mother     Heart Disease Father     Heart Disease Maternal Grandmother      Social History     Socioeconomic History    Marital status: Legally      Spouse name: Not on file    Number of children: Not on file    Years of education: Not on file    Highest education level: Not on file   Occupational History    Not on file   Social Needs    Financial resource strain: Not on file    Food insecurity     Worry: Not on file     Inability: Not on file    Transportation needs     Medical: Not on file     Non-medical: Not on file   Tobacco Use    Smoking status: Former Smoker     Packs/day: 0.25     Years: 9.00     Pack years: 2.25     Types: Cigarettes     Last attempt to quit: 2017     Years since quitting: 3.1    Smokeless tobacco: Former User     Quit date: 2017   Substance and Sexual Activity    Alcohol use: No     Comment: occasional     Drug use: No    Sexual activity: Yes     Partners: Male   Lifestyle    Physical activity     Days per week: Not on file     Minutes per session: Not on file    Stress: Not on file   Relationships    Social connections     Talks on phone: Not on file     Gets together: Not on file     Attends Jew service: Not on file     Active member of club or organization: Not on file     Attends meetings of clubs or organizations: Not on file     Relationship status: Not on file    Intimate partner violence     Fear of current or ex partner: Not Albumin/Globulin Ratio 1.3 1.1 - 2.2    Total Bilirubin <0.2 0.0 - 1.0 mg/dL    Alkaline Phosphatase 63 40 - 129 U/L    ALT 10 10 - 40 U/L    AST 22 15 - 37 U/L    Globulin 3.2 g/dL         RADIOLOGY  Ct Chest W Contrast    Result Date: 3/9/2020  EXAMINATION: CT OF THE CHEST WITH CONTRAST 3/9/2020 10:19 pm TECHNIQUE: CT of the chest was performed with the administration of intravenous contrast. Multiplanar reformatted images are provided for review. Dose modulation, iterative reconstruction, and/or weight based adjustment of the mA/kV was utilized to reduce the radiation dose to as low as reasonably achievable. COMPARISON: Chest x-ray 12/02/2018, CT chest 01/15/2017 HISTORY: ORDERING SYSTEM PROVIDED HISTORY: L breast pain, discharge, hx of possible malignancy, occasional chest pain TECHNOLOGIST PROVIDED HISTORY: Reason for exam:->L breast pain, discharge, hx of possible malignancy, occasional chest pain Reason for Exam: Breast Pain (pt states she has had L breast pain for a while, states worsening and intense for the last 5 days with increased DC from nipple, states found lump on breast a couple years ago and lost her health insurance and was unable to follow up with anyone for it ) Acuity: Acute Type of Exam: Initial FINDINGS: Mediastinum: No mediastinal adenopathy. The central pulmonary arteries and aorta are within normal limits in caliber and course without acute abnormality. The heart size is within normal limits, stable. No mediastinal, hilar, nor axillary lymphadenopathy. Lungs/pleura: The central airways are patent. The lungs are clear. There is minimal mosaic attenuation within the medial right lower lobe, nonspecific finding suggesting air trapping or small airways disease. Upper Abdomen: Limited images of the upper abdomen show no acute abnormality. Soft Tissues/Bones: No acute or focal bony abnormality. No focal breast abnormality is identified. No subcutaneous focal fluid collection is evident. Minimal mosaic attenuation within the medial right lung base suggesting air trapping or small airways disease. Otherwise, unremarkable study. No focal left breast abnormality is seen. Given history, dedicated mammography is recommended. ED COURSE/MDM  Patient seen and evaluated. Old records reviewed. Labs and imaging reviewed and results discussed with patient. 4year-old female left breast pain, explained that we did not have ultrasound here, we do not have MRI here, explained that I would not want the radiation exposure of CT scan however patient was very concerned about the possibility of malignancy, and has had a little chest pain in the past, no acute process noted, no obvious breast mass, but recommended she go back to OB/GYN and get follow-up imaging, repeat mammogram, she states she does not have insurance currently but will be getting it soon, pregnancy negative, labs otherwise unremarkable, felt better after Norco and Toradol, given fluid bolus since we are giving her contrast and Toradol, I feel there is a strong anxiety component, she does have a history of generalized anxiety disorder/OCD, discussed ceasing the stimulation of her breast, explained that the more she rubbed and tried to express fluid from her nipple she would make things worse, worse tenderness, and worsening discharge, explained that she is basically inducing lactation doing this, overstimulating with breast, given prescription for ibuprofen for home, but explained I did not feel it appropriate to give opiates for home for this type of pain, I was unable to see any mass noted on CT scan that had been noted past on her mammogram, strict return precautions given, all questions answered, will return if any worsening symptoms or new concerns, see AVS for further discharge information, patient verbalized understanding of plan, felt comfortable going home.     Orders Placed This Encounter   Procedures    CT CHEST W CONTRAST    Pregnancy, Urine    CBC Auto Differential    Comprehensive Metabolic Panel w/ Reflex to MG     Orders Placed This Encounter   Medications    ketorolac (TORADOL) injection 15 mg    HYDROcodone-acetaminophen (NORCO) 5-325 MG per tablet 1 tablet    0.9 % sodium chloride bolus    iopamidol (ISOVUE-370) 76 % injection 75 mL    ibuprofen (ADVIL;MOTRIN) 800 MG tablet     Sig: Take 1 tablet by mouth every 8 hours as needed for Pain     Dispense:  21 tablet     Refill:  0          CLINICAL IMPRESSION  1. Breast pain, left    2. Nipple discharge in female    3. Mass of left breast on mammogram        Blood pressure 117/74, pulse 105, temperature 98.3 °F (36.8 °C), temperature source Oral, resp. rate 18, height 5' 2\" (1.575 m), weight 163 lb (73.9 kg), last menstrual period 02/24/2020, SpO2 98 %, not currently breastfeeding. DISPOSITION  Shan Ferrari was discharged to home in stable condition.                   Shravan Moscoso, DO  03/10/20 [id number]

## 2020-04-21 ENCOUNTER — HOSPITAL ENCOUNTER (EMERGENCY)
Age: 35
Discharge: HOME | End: 2020-04-21
Attending: EMERGENCY MEDICINE

## 2020-04-21 VITALS
WEIGHT: 168 LBS | OXYGEN SATURATION: 97 % | HEART RATE: 92 BPM | TEMPERATURE: 98.3 F | HEIGHT: 62 IN | DIASTOLIC BLOOD PRESSURE: 78 MMHG | RESPIRATION RATE: 16 BRPM | SYSTOLIC BLOOD PRESSURE: 122 MMHG | BODY MASS INDEX: 30.91 KG/M2

## 2020-04-21 LAB
A/G RATIO: 1.3 (ref 1.1–2.2)
ALBUMIN SERPL-MCNC: 4.3 G/DL (ref 3.4–5)
ALP BLD-CCNC: 77 U/L (ref 40–129)
ALT SERPL-CCNC: 13 U/L (ref 10–40)
ANION GAP SERPL CALCULATED.3IONS-SCNC: 10 MMOL/L (ref 3–16)
AST SERPL-CCNC: 17 U/L (ref 15–37)
BASOPHILS ABSOLUTE: 0 K/UL (ref 0–0.2)
BASOPHILS RELATIVE PERCENT: 0.2 %
BILIRUB SERPL-MCNC: <0.2 MG/DL (ref 0–1)
BUN BLDV-MCNC: 12 MG/DL (ref 7–20)
CALCIUM SERPL-MCNC: 9.3 MG/DL (ref 8.3–10.6)
CHLORIDE BLD-SCNC: 105 MMOL/L (ref 99–110)
CO2: 23 MMOL/L (ref 21–32)
CREAT SERPL-MCNC: 0.7 MG/DL (ref 0.6–1.1)
EOSINOPHILS ABSOLUTE: 0.2 K/UL (ref 0–0.6)
EOSINOPHILS RELATIVE PERCENT: 2.2 %
GFR AFRICAN AMERICAN: >60
GFR NON-AFRICAN AMERICAN: >60
GLOBULIN: 3.3 G/DL
GLUCOSE BLD-MCNC: 106 MG/DL (ref 70–99)
HCG QUALITATIVE: NEGATIVE
HCT VFR BLD CALC: 39.7 % (ref 36–48)
HEMOGLOBIN: 13 G/DL (ref 12–16)
LYMPHOCYTES ABSOLUTE: 3.1 K/UL (ref 1–5.1)
LYMPHOCYTES RELATIVE PERCENT: 38.5 %
MCH RBC QN AUTO: 28.4 PG (ref 26–34)
MCHC RBC AUTO-ENTMCNC: 32.7 G/DL (ref 31–36)
MCV RBC AUTO: 86.8 FL (ref 80–100)
MONOCYTES ABSOLUTE: 0.6 K/UL (ref 0–1.3)
MONOCYTES RELATIVE PERCENT: 7.9 %
NEUTROPHILS ABSOLUTE: 4.2 K/UL (ref 1.7–7.7)
NEUTROPHILS RELATIVE PERCENT: 51.2 %
PDW BLD-RTO: 14.5 % (ref 12.4–15.4)
PLATELET # BLD: 311 K/UL (ref 135–450)
PMV BLD AUTO: 7.9 FL (ref 5–10.5)
POTASSIUM REFLEX MAGNESIUM: 4.1 MMOL/L (ref 3.5–5.1)
RBC # BLD: 4.57 M/UL (ref 4–5.2)
SODIUM BLD-SCNC: 138 MMOL/L (ref 136–145)
TOTAL PROTEIN: 7.6 G/DL (ref 6.4–8.2)
WBC # BLD: 8.2 K/UL (ref 4–11)

## 2020-04-21 PROCEDURE — 2580000003 HC RX 258: Performed by: NURSE PRACTITIONER

## 2020-04-21 PROCEDURE — 96375 TX/PRO/DX INJ NEW DRUG ADDON: CPT

## 2020-04-21 PROCEDURE — 6360000002 HC RX W HCPCS: Performed by: NURSE PRACTITIONER

## 2020-04-21 PROCEDURE — 85025 COMPLETE CBC W/AUTO DIFF WBC: CPT

## 2020-04-21 PROCEDURE — 80053 COMPREHEN METABOLIC PANEL: CPT

## 2020-04-21 PROCEDURE — 84703 CHORIONIC GONADOTROPIN ASSAY: CPT

## 2020-04-21 PROCEDURE — 99283 EMERGENCY DEPT VISIT LOW MDM: CPT

## 2020-04-21 PROCEDURE — 96374 THER/PROPH/DIAG INJ IV PUSH: CPT

## 2020-04-21 PROCEDURE — 84146 ASSAY OF PROLACTIN: CPT

## 2020-04-21 RX ORDER — KETOROLAC TROMETHAMINE 30 MG/ML
30 INJECTION, SOLUTION INTRAMUSCULAR; INTRAVENOUS ONCE
Status: COMPLETED | OUTPATIENT
Start: 2020-04-21 | End: 2020-04-21

## 2020-04-21 RX ORDER — ONDANSETRON 2 MG/ML
4 INJECTION INTRAMUSCULAR; INTRAVENOUS ONCE
Status: COMPLETED | OUTPATIENT
Start: 2020-04-21 | End: 2020-04-21

## 2020-04-21 RX ORDER — MORPHINE SULFATE 4 MG/ML
4 INJECTION, SOLUTION INTRAMUSCULAR; INTRAVENOUS ONCE
Status: COMPLETED | OUTPATIENT
Start: 2020-04-21 | End: 2020-04-21

## 2020-04-21 RX ORDER — 0.9 % SODIUM CHLORIDE 0.9 %
1000 INTRAVENOUS SOLUTION INTRAVENOUS ONCE
Status: COMPLETED | OUTPATIENT
Start: 2020-04-21 | End: 2020-04-21

## 2020-04-21 RX ADMIN — MORPHINE SULFATE 4 MG: 4 INJECTION, SOLUTION INTRAMUSCULAR; INTRAVENOUS at 20:18

## 2020-04-21 RX ADMIN — ONDANSETRON HYDROCHLORIDE 4 MG: 2 INJECTION, SOLUTION INTRAMUSCULAR; INTRAVENOUS at 20:17

## 2020-04-21 RX ADMIN — KETOROLAC TROMETHAMINE 30 MG: 30 INJECTION, SOLUTION INTRAMUSCULAR at 20:18

## 2020-04-21 RX ADMIN — SODIUM CHLORIDE 1000 ML: 9 INJECTION, SOLUTION INTRAVENOUS at 20:19

## 2020-04-21 ASSESSMENT — ENCOUNTER SYMPTOMS
SORE THROAT: 0
NAUSEA: 1
SHORTNESS OF BREATH: 0
COLOR CHANGE: 0
RHINORRHEA: 0
ABDOMINAL PAIN: 0

## 2020-04-21 ASSESSMENT — PAIN DESCRIPTION - DESCRIPTORS: DESCRIPTORS: THROBBING;PRESSURE

## 2020-04-21 ASSESSMENT — PAIN DESCRIPTION - PAIN TYPE: TYPE: ACUTE PAIN

## 2020-04-21 ASSESSMENT — PAIN SCALES - GENERAL
PAINLEVEL_OUTOF10: 3
PAINLEVEL_OUTOF10: 8
PAINLEVEL_OUTOF10: 6

## 2020-04-21 ASSESSMENT — PAIN DESCRIPTION - ORIENTATION: ORIENTATION: LEFT

## 2020-04-21 ASSESSMENT — PAIN DESCRIPTION - LOCATION: LOCATION: BREAST

## 2020-04-21 ASSESSMENT — PAIN DESCRIPTION - FREQUENCY: FREQUENCY: CONTINUOUS

## 2020-04-21 NOTE — ED PROVIDER NOTES
Cardiovascular: Negative for chest pain. Gastrointestinal: Positive for nausea. Negative for abdominal pain. Genitourinary: Negative for decreased urine volume and difficulty urinating. Left breast pain   Musculoskeletal: Negative for arthralgias and myalgias. Skin: Negative for color change and rash. Neurological: Negative for dizziness and light-headedness. Psychiatric/Behavioral: Negative for agitation. All other systems reviewed and are negative. Positives and Pertinent negatives as per HPI. Except as noted above in the ROS, all other systems were reviewed and negative. PAST MEDICAL HISTORY     Past Medical History:   Diagnosis Date    Ankle fracture, right     Anxiety     Anxiety     Cardiac arrhythmia due to congenital heart disease     COPD [facility]chronic obstructive pulmonary disease) ([facility] [address]     Depression     Endometriosis     Kidney stone     OCD (obsessive compulsive disorder)     Ovarian cyst     Sinus arrhythmia          SURGICAL HISTORY       Past Surgical History:   Procedure Laterality Date     SECTION      FRACTURE SURGERY      right ankle    OTHER SURGICAL HISTORY  10/16/2018    excision lower abdominal mass    [place] OFFICE/OUTPT VISIT,PROCEDURE ONLY N/A 10/16/2018    EXCISION ABDOMINAL WALL MASS performed by Cynthia Goldstein MD at [facility]       Previous Medications    CLONAZEPAM (KLONOPIN) 0.5 MG TABLET    Take 1 tablet by mouth every 6 hours as needed for Anxiety    CLONIDINE (CATAPRES) 0.1 MG TABLET    Take 0.1 mg by mouth nightly     IBUPROFEN (ADVIL;MOTRIN) 800 MG TABLET    Take 1 tablet by mouth every 8 hours as needed for Pain    SERTRALINE (ZOLOFT) 50 MG TABLET    Take 50 mg by mouth nightly         ALLERGIES     Bactrim ds [sulfamethoxazole-trimethoprim]; Duloxetine; Ethinyl estradiol; Norgestimate;  Other; and Sulfa antibiotics    FAMILY HISTORY       Family History   Problem Relation Age of Onset    Arthritis found.      PROCEDURES   Unless otherwise noted below, none     Procedures     CRITICAL CARE TIME   N/A    CONSULTS:  None      EMERGENCYDEPARTMENT COURSE and DIFFERENTIAL DIAGNOSIS/MDM:   Vitals:    Vitals:    04/21/20 1919   BP: (!) 131/59   Pulse: 97   Resp: 16   Temp: 98.3 °F (36.8 °C)   TempSrc: Oral   SpO2: 98%   Weight: 168 lb (76.2 kg)   Height: 5' 2\" (1.575 m)       Patient was given the following medications:  Medications   0.9 % sodium chloride bolus (1,000 mLs Intravenous New Bag 4/21/20 2019)   morphine sulfate (PF) injection 4 mg (4 mg Intravenous Given 4/21/20 2018)   ketorolac (TORADOL) injection 30 mg (30 mg Intravenous Given 4/21/20 2018)   ondansetron (ZOFRAN) injection 4 mg (4 mg Intravenous Given 4/21/20 2017)       Patient was seen and evaluated by myself. Patient here for left breast pain swelling and nipple discharge. Patient reports that she has had this for about the last year. She states that she did have a mammogram and a breast ultrasound approximately 1 year ago. She states that they did find lumps to her left breast that they were monitoring however she did not follow-up with additional imaging due to loss of insurance. Patient states over the last few days her left breast has become more swollen and painful. On exam she is awake and alert hemodynamically stable nontoxic in appearance. She does report that she is recently been on some antibiotics by her primary care doctor for concerns for a breast infection however her symptoms have not improved despite taking the antibiotics. Patient denies any breast-feeding at this time. She denies any pregnancy. She said no fever nausea vomiting or diarrhea. She does have a strong family history of breast cancer. There was yellowish discharge from her left nipple but no changes to the skin or inverted nipples. Left breast was slightly larger than the right. Patient was provided with IV fluids nausea and pain medications.   Patient on

## 2020-04-22 LAB — PROLACTIN: 17 NG/ML

## 2020-04-28 ENCOUNTER — HOSPITAL ENCOUNTER (OUTPATIENT)
Dept: WOMENS IMAGING | Age: 35
Discharge: HOME | End: 2020-04-28

## 2020-04-28 PROCEDURE — G0279 TOMOSYNTHESIS, MAMMO: HCPCS

## 2020-04-28 PROCEDURE — 76642 ULTRASOUND BREAST LIMITED: CPT

## 2020-11-05 ENCOUNTER — HOSPITAL ENCOUNTER (EMERGENCY)
Age: 35
Discharge: HOME | End: 2020-11-05
Attending: EMERGENCY MEDICINE

## 2020-11-05 ENCOUNTER — APPOINTMENT (OUTPATIENT)
Dept: GENERAL RADIOLOGY | Age: 35
End: 2020-11-05

## 2020-11-05 VITALS
HEART RATE: 88 BPM | OXYGEN SATURATION: 99 % | DIASTOLIC BLOOD PRESSURE: 71 MMHG | BODY MASS INDEX: 30.36 KG/M2 | SYSTOLIC BLOOD PRESSURE: 140 MMHG | WEIGHT: 165 LBS | HEIGHT: 62 IN | RESPIRATION RATE: 16 BRPM | TEMPERATURE: 98 F

## 2020-11-05 PROCEDURE — 99284 EMERGENCY DEPT VISIT MOD MDM: CPT

## 2020-11-05 PROCEDURE — 73610 X-RAY EXAM OF ANKLE: CPT

## 2020-11-05 PROCEDURE — 6370000000 HC RX 637 (ALT 250 FOR IP): Performed by: EMERGENCY MEDICINE

## 2020-11-05 RX ORDER — ACETAMINOPHEN 500 MG
1000 TABLET ORAL ONCE
Status: COMPLETED | OUTPATIENT
Start: 2020-11-05 | End: 2020-11-05

## 2020-11-05 RX ADMIN — ACETAMINOPHEN 1000 MG: 500 TABLET ORAL at 19:29

## 2020-11-05 ASSESSMENT — PAIN DESCRIPTION - FREQUENCY
FREQUENCY: INTERMITTENT
FREQUENCY: INTERMITTENT

## 2020-11-05 ASSESSMENT — PAIN DESCRIPTION - LOCATION
LOCATION: ANKLE
LOCATION: ANKLE

## 2020-11-05 ASSESSMENT — PAIN DESCRIPTION - DESCRIPTORS
DESCRIPTORS: ACHING;DISCOMFORT
DESCRIPTORS: ACHING;DISCOMFORT

## 2020-11-05 ASSESSMENT — PAIN SCALES - GENERAL
PAINLEVEL_OUTOF10: 4
PAINLEVEL_OUTOF10: 4

## 2020-11-05 ASSESSMENT — PAIN DESCRIPTION - PAIN TYPE
TYPE: ACUTE PAIN
TYPE: ACUTE PAIN

## 2020-11-05 ASSESSMENT — PAIN - FUNCTIONAL ASSESSMENT: PAIN_FUNCTIONAL_ASSESSMENT: 0-10

## 2020-11-05 ASSESSMENT — PAIN DESCRIPTION - ORIENTATION
ORIENTATION: LEFT
ORIENTATION: LEFT

## 2020-11-06 NOTE — ED PROVIDER NOTES
Emergency Department Attending Note    Jung Valadez MD    Date of ED VIsit: 2020    CHIEF COMPLAINT  Ankle Pain (left ankle pain after falling today. edema noted. pt was given an ice pack upo arrival to ER)      MONTY Overton is a 30 y.o. female  With Vital signs of /70   Pulse 94   Temp 97.9 °F (36.6 °C) (Oral)   Resp 20   Ht 5' 2\" (1.575 m)   Wt 165 lb (74.8 kg)   LMP 2020   SpO2 99%   BMI 30.18 kg/m²  who presents to the ED with a complaint of left ankle pain. . Patient seen and evaluated in room 6. Patient states that she was walking and apparently stepped over a curb or bump on the sidewalk and inverted her left ankle. She now comes in to get it checked out for any fractures. She has pain over the lateral malleolus. She is able to wiggle her toes without any difficulty. She did not fall resulting in any injuries other than the left ankle. .  No other complaints, modifying factors or associated symptoms. Patients Past medical history reviewed and listed below  Past Medical History:   Diagnosis Date    Ankle fracture, right     Anxiety     Anxiety     Cardiac arrhythmia due to congenital heart disease     [facility]ch[facility] obstructive pulmonary disease) ([facility] [address]     Depression     Endometriosis     Kidney stone     OCD (obsessive compulsive disorder)     Ovarian cyst     Sinus arrhythmia      Past Surgical History:   Procedure Laterality Date     SECTION      FRACTURE SURGERY      right ankle    OTHER SURGICAL HISTORY  10/16/2018    excision lower abdominal mass    [place] OFFICE/OUTPT VISIT,PROCEDURE ONLY N/A 10/16/2018    EXCISION ABDOMINAL WALL MASS performed by Simeon Priest MD at [facility]       I have reviewed the following from the nursing documentation.     Family History   Problem Relation Age of Onset    Arthritis Mother     High Blood Pressure Mother     Heart Disease Father     Heart Disease Maternal Grandmother Social History     Socioeconomic History    Marital status: Legally      Spouse name: Not on file    Number of children: Not on file    Years of education: Not on file    Highest education level: Not on file   Occupational History    Not on file   Social Needs    Financial resource strain: Not on file    Food insecurity     Worry: Not on file     Inability: Not on file    Transportation needs     Medical: Not on file     Non-medical: Not on file   Tobacco Use    Smoking status: Former Smoker     Packs/day: 0.25     Years: 9.00     Pack years: 2.25     Types: Cigarettes     Last attempt to quit: 1/12/2017     Years since quitting: 3.8    Smokeless tobacco: Former User     Quit date: 1/14/2017   Substance and Sexual Activity    Alcohol use: No     Comment: occasional     Drug use: No    Sexual activity: Yes     Partners: Male   Lifestyle    Physical activity     Days per week: Not on file     Minutes per session: Not on file    Stress: Not on file   Relationships    Social connections     Talks on phone: Not on file     Gets together: Not on file     Attends Confucianist service: Not on file     Active member of club or organization: Not on file     Attends meetings of clubs or organizations: Not on file     Relationship status: Not on file    Intimate partner violence     Fear of current or ex partner: Not on file     Emotionally abused: Not on file     Physically abused: Not on file     Forced sexual activity: Not on file   Other Topics Concern    Not on file   Social History Narrative    Not on file     No current facility-administered medications for this encounter.       Current Outpatient Medications   Medication Sig Dispense Refill    sertraline (ZOLOFT) 50 MG tablet Take 50 mg by mouth nightly      clonazePAM (KLONOPIN) 0.5 MG tablet Take 1 tablet by mouth every 6 hours as needed for Anxiety (Patient taking differently: Take 1 mg by mouth every 6 hours as needed for Anxiety ) 1 tablet 0    ibuprofen (ADVIL;MOTRIN) 800 MG tablet Take 1 tablet by mouth every 8 hours as needed for Pain 21 tablet 0    cloNIDine (CATAPRES) 0.1 MG tablet Take 0.1 mg by mouth nightly        Allergies   Allergen Reactions    Bactrim Ds [Sulfamethoxazole-Trimethoprim] Anaphylaxis    Duloxetine Anaphylaxis    Ethinyl Estradiol     Norgestimate     Other      States she is allergic to orthotrycycline    Sulfa Antibiotics        REVIEW OF SYSTEMS  10 systems reviewed, pertinent positives per HPI otherwise noted to be negative     PHYSICAL EXAM  /70   Pulse 94   Temp 97.9 °F (36.6 °C) (Oral)   Resp 20   Ht 5' 2\" (1.575 m)   Wt 165 lb (74.8 kg)   LMP 11/02/2020   SpO2 99%   BMI 30.18 kg/m²   GENERAL APPEARANCE: Awake and alert. Cooperative. In no obvious distress. HEAD: Normocephalic. Atraumatic. EYES: PERRL. EOM's grossly intact. ENT: Mucous membranes are pink and moist.   NECK: Supple. HEART: RRR. No murmurs. LUNGS: Respirations unlabored. CTAB. Good air exchange. ABDOMEN: Soft. Non-distended. Non-tender. No masses. No organomegaly. No guarding or rebound. EXTREMITIES: No peripheral edema. Moves all extremities equally. All extremities neurovascularly intact. There is tenderness over the lateral malleolus on the left ankle  SKIN: Warm and dry. No acute rashes. NEUROLOGICAL: Alert and oriented. . Strength 5/5, sensation intact. Gait normal.   PSYCHIATRIC: Normal mood and affect. No HI or SI expressed to me. RADIOLOGY    If acquired see below     EKG:     If acquired see below       ED COURSE/MDM    Radiographs reveal no obvious fractures or dislocations so the patient will be placed in a ankle splint for comfort and advised to follow-up with her primary care doctor if symptoms do not get better in the next 2 days or so she will be advised to take Tylenol Motrin for pain and use ice and keep it elevated for additional pain control.     ED Course as of Nov 05 1932   Thu Nov 05, 2020 1931 FINDINGS:  No evidence of acute fracture or dislocation. Normal alignment of the ankle  mortise. No focal osseous lesion. No evidence of joint effusion. No focal  soft tissue abnormality.     IMPRESSION:  No evidence for fracture or malalignment left   XR ANKLE LEFT (MIN 3 VIEWS) [DL]      ED Course User Index  [DL] Tiny Smith MD       The ED course and plan were reviewed and results discussed with the patient    The patient understood and agreed with the Discharge/transfer planning.     CLINICAL IMPRESSION and DISPOSITION    Shan Ferrari was stable and diagnosed with left ankle sprain    Patient was treated with Tylenol             Tiny Smith MD  11/05/20 1933

## 2021-01-26 ENCOUNTER — OFFICE VISIT (OUTPATIENT)
Dept: ENT CLINIC | Age: 36
End: 2021-01-26

## 2021-01-26 VITALS
SYSTOLIC BLOOD PRESSURE: 109 MMHG | TEMPERATURE: 98.2 F | WEIGHT: 199 LBS | DIASTOLIC BLOOD PRESSURE: 70 MMHG | HEIGHT: 63 IN | BODY MASS INDEX: 35.26 KG/M2 | HEART RATE: 94 BPM

## 2021-01-26 DIAGNOSIS — K21.9 LARYNGOPHARYNGEAL REFLUX (LPR): ICD-10-CM

## 2021-01-26 DIAGNOSIS — R59.1 LYMPHADENOPATHY OF HEAD AND NECK: Primary | ICD-10-CM

## 2021-01-26 DIAGNOSIS — J03.90 TONSILLITIS: ICD-10-CM

## 2021-01-26 PROCEDURE — 31575 DIAGNOSTIC LARYNGOSCOPY: CPT | Performed by: STUDENT IN AN ORGANIZED HEALTH CARE EDUCATION/TRAINING PROGRAM

## 2021-01-26 PROCEDURE — 99204 OFFICE O/P NEW MOD 45 MIN: CPT | Performed by: STUDENT IN AN ORGANIZED HEALTH CARE EDUCATION/TRAINING PROGRAM

## 2021-01-26 ASSESSMENT — ENCOUNTER SYMPTOMS
SHORTNESS OF BREATH: 0
RHINORRHEA: 0
COUGH: 0
VOICE CHANGE: 1
SORE THROAT: 1
VOMITING: 0
NAUSEA: 0
EYE PAIN: 0

## 2021-01-26 NOTE — PROGRESS NOTES
[facility]      Patient Name: [address] Record Number:  [id number]  Primary Care Physician:  SHERI Hyde CNP  Date of Consultation: 2021    Chief Complaint:   Chief Complaint   Patient presents with    Pharyngitis     Has had strep two times in last 2 months, has been on two rounds of antibiotics. States she has pain from jaw, to collar bone, to back of neck. HISTORY OF PRESENT ILLNESS  Shan is a(n) 29 y.o. female who presents with sore throat and neck pain. She states she has had throat pain for several months. She was started on Nexium several months ago without any relief. She has been seen by her PCP twice within the last several weeks and was given antibiotics for 2+ strep test.  This has not improved her symptoms at all. She feels as though her neck is swollen and is tender to touch. She has several areas that are enlarged on her neck she feels. Also complains of fatigue and tiredness. She has not had a Monospot test.  She also complains of hoarseness and globus sensation. Patient Active Problem List   Diagnosis    Hx of syncope & presyncope    Hx of palpitations    ANDREINA (generalized anxiety disorder)    Former smoker    Obesity (BMI 30-39. 9)    Abdominal wall mass     Past Surgical History:   Procedure Laterality Date     SECTION      FRACTURE SURGERY      right ankle    OTHER SURGICAL HISTORY  10/16/2018    excision lower abdominal mass    [place] OFFICE/OUTPT VISIT,PROCEDURE ONLY N/A 10/16/2018    EXCISION ABDOMINAL WALL MASS performed by Maria T Ram MD at [address] OR     Family History   Problem Relation Age of Onset    Arthritis Mother     High Blood Pressure Mother     Heart Disease Father     Heart Disease Maternal Grandmother      Social History     Socioeconomic History    Marital status:      Spouse name: Not on file    Number of children: Not on file  Years of education: Not on file    Highest education level: Not on file   Occupational History    Not on file   Social Needs    Financial resource strain: Not on file    Food insecurity     Worry: Not on file     Inability: Not on file    Transportation needs     Medical: Not on file     Non-medical: Not on file   Tobacco Use    Smoking status: Former Smoker     Packs/day: 0.25     Years: 9.00     Pack years: 2.25     Types: Cigarettes     Quit date: 2017     Years since quittin.0    Smokeless tobacco: Never Used   Substance and Sexual Activity    Alcohol use: No     Comment: occasional     Drug use: No    Sexual activity: Yes     Partners: Male   Lifestyle    Physical activity     Days per week: Not on file     Minutes per session: Not on file    Stress: Not on file   Relationships    Social connections     Talks on phone: Not on file     Gets together: Not on file     Attends Episcopal service: Not on file     Active member of club or organization: Not on file     Attends meetings of clubs or organizations: Not on file     Relationship status: Not on file    Intimate partner violence     Fear of current or ex partner: Not on file     Emotionally abused: Not on file     Physically abused: Not on file     Forced sexual activity: Not on file   Other Topics Concern    Not on file   Social History Narrative    Not on file       DRUG/FOOD ALLERGIES: Bactrim ds [sulfamethoxazole-trimethoprim], Duloxetine, Ethinyl estradiol, Norgestimate, Other, and Sulfa antibiotics    CURRENT MEDICATIONS  Prior to Admission medications    Medication Sig Start Date End Date Taking?  Authorizing Provider   sertraline (ZOLOFT) 50 MG tablet Take 50 mg by mouth nightly   Yes Historical Provider, MD   clonazePAM (KLONOPIN) 0.5 MG tablet Take 1 tablet by mouth every 6 hours as needed for Anxiety Patient taking differently: Take 1 mg by mouth every 6 hours as needed for Anxiety  1/19/17  Yes Allen Jaimes MD       REVIEW OF SYSTEMS  The following systems were reviewed and revealed the following in addition to any already discussed in the HPI:    Review of Systems   Constitutional: Positive for fatigue. Negative for fever. HENT: Positive for sore throat and voice change. Negative for congestion, ear pain, postnasal drip, rhinorrhea and sneezing. Eyes: Negative for pain and visual disturbance. Respiratory: Negative for cough and shortness of breath. Cardiovascular: Negative for chest pain. Gastrointestinal: Negative for nausea and vomiting. Endocrine: Negative. Genitourinary: Negative. Musculoskeletal: Negative for neck pain and neck stiffness. Skin: Negative for rash. Neurological: Negative for dizziness and headaches. PHYSICAL EXAM  /70 (Site: Left Upper Arm, Position: Sitting, Cuff Size: Large Adult)   Pulse 94   Temp 98.2 °F (36.8 °C) (Infrared)   Ht 5' 2.5\" (1.588 m)   Wt 199 lb (90.3 kg)   BMI 35.82 kg/m²     GENERAL: No Acute Distress, Alert and Oriented, no hoarseness  EYES: EOMI, Anti-icteric  NOSE: No epistaxis, nasal mucosa within normal limits, no purulent drainage  EARS: Normal external canal appearance, EAC patent bilaterally, TMs intact bilaterally with no evidence of effusions  FACE: 1/6 House-Brackmann Scale, symmetric, sensation equal bilaterally  ORAL CAVITY: No masses or lesions palpated, uvula is midline, moist mucous membranes, 3+ tonsils, good dentition  NECK: Normal range of motion, no thyromegaly, trachea is midline, bilateral lymphadenopathy, no neck masses, no crepitus  CHEST: Normal respiratory effort, no retractions, breathing comfortably  SKIN: No rashes, normal appearing skin, no evidence of skin lesions/tumors    RADIOLOGY  Summary of findings:      PROCEDURE  Flexible Laryngoscopy    Pre op: Hoarseness and throat pain.

## 2021-01-28 ENCOUNTER — APPOINTMENT (OUTPATIENT)
Dept: CT IMAGING | Age: 36
End: 2021-01-28

## 2021-01-28 ENCOUNTER — HOSPITAL ENCOUNTER (EMERGENCY)
Age: 36
Discharge: HOME | End: 2021-01-28
Attending: EMERGENCY MEDICINE

## 2021-01-28 VITALS
TEMPERATURE: 98.5 F | SYSTOLIC BLOOD PRESSURE: 129 MMHG | BODY MASS INDEX: 34.96 KG/M2 | OXYGEN SATURATION: 100 % | HEIGHT: 62 IN | RESPIRATION RATE: 18 BRPM | WEIGHT: 190 LBS | HEART RATE: 88 BPM | DIASTOLIC BLOOD PRESSURE: 75 MMHG

## 2021-01-28 DIAGNOSIS — M54.2 NECK PAIN: Primary | ICD-10-CM

## 2021-01-28 LAB
A/G RATIO: 1.4 (ref 1.1–2.2)
ALBUMIN SERPL-MCNC: 4.4 G/DL (ref 3.4–5)
ALP BLD-CCNC: 62 U/L (ref 40–129)
ALT SERPL-CCNC: 13 U/L (ref 10–40)
ANION GAP SERPL CALCULATED.3IONS-SCNC: 6 MMOL/L (ref 3–16)
AST SERPL-CCNC: 12 U/L (ref 15–37)
BASOPHILS ABSOLUTE: 0.1 K/UL (ref 0–0.2)
BASOPHILS RELATIVE PERCENT: 1 %
BILIRUB SERPL-MCNC: <0.2 MG/DL (ref 0–1)
BUN BLDV-MCNC: 11 MG/DL (ref 7–20)
CALCIUM SERPL-MCNC: 9.3 MG/DL (ref 8.3–10.6)
CHLORIDE BLD-SCNC: 106 MMOL/L (ref 99–110)
CO2: 27 MMOL/L (ref 21–32)
CREAT SERPL-MCNC: 0.7 MG/DL (ref 0.6–1.1)
EOSINOPHILS ABSOLUTE: 0.1 K/UL (ref 0–0.6)
EOSINOPHILS RELATIVE PERCENT: 1.6 %
GFR AFRICAN AMERICAN: >60
GFR NON-AFRICAN AMERICAN: >60
GLOBULIN: 3.1 G/DL
GLUCOSE BLD-MCNC: 92 MG/DL (ref 70–99)
HCT VFR BLD CALC: 38.6 % (ref 36–48)
HEMOGLOBIN: 12.6 G/DL (ref 12–16)
LYMPHOCYTES ABSOLUTE: 2.9 K/UL (ref 1–5.1)
LYMPHOCYTES RELATIVE PERCENT: 34.9 %
MCH RBC QN AUTO: 28.2 PG (ref 26–34)
MCHC RBC AUTO-ENTMCNC: 32.7 G/DL (ref 31–36)
MCV RBC AUTO: 86.2 FL (ref 80–100)
MONO TEST: NEGATIVE
MONOCYTES ABSOLUTE: 0.5 K/UL (ref 0–1.3)
MONOCYTES RELATIVE PERCENT: 6.5 %
NEUTROPHILS ABSOLUTE: 4.7 K/UL (ref 1.7–7.7)
NEUTROPHILS RELATIVE PERCENT: 56 %
PDW BLD-RTO: 14.3 % (ref 12.4–15.4)
PLATELET # BLD: 304 K/UL (ref 135–450)
PMV BLD AUTO: 7.9 FL (ref 5–10.5)
POTASSIUM SERPL-SCNC: 3.9 MMOL/L (ref 3.5–5.1)
RBC # BLD: 4.47 M/UL (ref 4–5.2)
SEDIMENTATION RATE, ERYTHROCYTE: 9 MM/HR (ref 0–20)
SODIUM BLD-SCNC: 139 MMOL/L (ref 136–145)
TOTAL PROTEIN: 7.5 G/DL (ref 6.4–8.2)
WBC # BLD: 8.5 K/UL (ref 4–11)

## 2021-01-28 PROCEDURE — 99283 EMERGENCY DEPT VISIT LOW MDM: CPT

## 2021-01-28 PROCEDURE — 6360000004 HC RX CONTRAST MEDICATION: Performed by: EMERGENCY MEDICINE

## 2021-01-28 PROCEDURE — 80053 COMPREHEN METABOLIC PANEL: CPT

## 2021-01-28 PROCEDURE — 70491 CT SOFT TISSUE NECK W/DYE: CPT

## 2021-01-28 PROCEDURE — 85025 COMPLETE CBC W/AUTO DIFF WBC: CPT

## 2021-01-28 PROCEDURE — 36415 COLL VENOUS BLD VENIPUNCTURE: CPT

## 2021-01-28 PROCEDURE — 86308 HETEROPHILE ANTIBODY SCREEN: CPT

## 2021-01-28 PROCEDURE — 85652 RBC SED RATE AUTOMATED: CPT

## 2021-01-28 RX ORDER — SODIUM CHLORIDE 9 MG/ML
INJECTION, SOLUTION INTRAVENOUS CONTINUOUS
Status: DISCONTINUED | OUTPATIENT
Start: 2021-01-28 | End: 2021-01-28 | Stop reason: HOSPADM

## 2021-01-28 RX ADMIN — IOPAMIDOL 75 ML: 755 INJECTION, SOLUTION INTRAVENOUS at 18:20

## 2021-01-28 ASSESSMENT — PAIN DESCRIPTION - LOCATION: LOCATION: NECK

## 2021-01-28 ASSESSMENT — ENCOUNTER SYMPTOMS
SHORTNESS OF BREATH: 0
ABDOMINAL PAIN: 0
BACK PAIN: 0

## 2021-01-28 ASSESSMENT — PAIN SCALES - GENERAL: PAINLEVEL_OUTOF10: 7

## 2021-01-28 ASSESSMENT — PAIN DESCRIPTION - PAIN TYPE: TYPE: ACUTE PAIN

## 2021-01-28 NOTE — ED PROVIDER NOTES
[facility]      Pt Name: Sri Lezama  MRN: [id number]  [place] 1985  Date of evaluation: 1/28/2021  Provider: John Paul Buenrostro MD    [address] [place] [address]       Chief Complaint   Patient presents with    Neck Pain         HISTORY OF PRESENT ILLNESS   (Location/Symptom, Timing/Onset, Context/Setting, Quality, Duration, Modifying Factors, Severity)  Note limiting factors. Sri Lezama is a 29 y.o. female who presents to the emergency department     Patient presents emergency department history of apparently complaining of some swollen lymph nodes for several weeks. She has felt rundown with generalized myalgias also feeling tired exhausted  She was tested twice for strep screen both times apparently was positive and she was treated with penicillin x2 to ice but she has not gotten better she went and saw Dr. Nela Prado ENT doctor this last week. Who suggested that we check some blood work and get a CT contrast of her neck she said the pain seemed to get worse today so therefore she came directly here she has no trouble breathing no trouble swallowing no troubles with eating  At this point she has no known history of cancer no history of any other infectious or medical problems that we are aware of no mopped assist no productive cough patient says that the discomfort is more on the left side    They do not have a cat to suggest cat scratch fever  Oropharynx exam reveals some mild erythema but no signs of Angella's angina no signs of peritonsillar abscess no shift of the uvula  Patient has not been exposed to anybody with mononucleosis and she does not have underlying cancer    The history is provided by the patient. Nursing Notes were reviewed. REVIEW OF SYSTEMS    (2-9 systems for level 4, 10 or more for level 5)     Review of Systems   Constitutional: Negative for activity change, chills and fever. Respiratory: Negative for shortness of breath. Cardiovascular: Negative for chest pain. Gastrointestinal: Negative for abdominal pain. Genitourinary: Negative for pelvic pain. Musculoskeletal: Positive for myalgias. Negative for back pain, joint swelling and neck pain. Neurological: Negative for dizziness, speech difficulty and weakness. Hematological: Positive for adenopathy. Does not bruise/bleed easily. Psychiatric/Behavioral: Negative for behavioral problems. All other systems reviewed and are negative. Except as noted above the remainder of the review of systems was reviewed and negative.        PAST MEDICAL HISTORY     Past Medical History:   Diagnosis Date    Allergic rhinitis     Ankle fracture, right     Anxiety     Anxiety     Cardiac arrhythmia due to congenital heart disease     COPD (chronic obstructive pulmonary disease) (HCC)     Depression     Dizziness     Endometriosis     GERD (gastroesophageal reflux disease)     Kidney stone     OCD (obsessive compulsive disorder)     Ovarian cyst     Recurrent upper respiratory infection (URI)     Sinus arrhythmia          SURGICAL HISTORY       Past Surgical History:   Procedure Laterality Date     SECTION      FRACTURE SURGERY      right ankle    OTHER SURGICAL HISTORY  10/16/2018    excision lower abdominal mass    RI OFFICE/OUTPT VISIT,PROCEDURE ONLY N/A 10/16/2018    EXCISION ABDOMINAL WALL MASS performed by Mago Schrader MD at [address]       Previous Medications    CLONAZEPAM (KLONOPIN) 0.5 MG TABLET    Take 1 tablet by mouth every 6 hours as needed for Anxiety    SERTRALINE (ZOLOFT) 50 MG TABLET    Take 50 mg by mouth nightly       ALLERGIES     Bactrim ds [sulfamethoxazole-trimethoprim], Duloxetine, Ethinyl estradiol, Norgestimate, Other, and Sulfa antibiotics    FAMILY HISTORY       Family History   Problem Relation Age of Onset    Arthritis Mother     High Blood Pressure Mother     Heart Disease Father     Heart Disease Maternal Grandmother           SOCIAL HISTORY       Social History     Socioeconomic History    Marital status:      Spouse name: None    Number of children: None    Years of education: None    Highest education level: None   Occupational History    None   Social Needs    Financial resource strain: None    Food insecurity     Worry: None     Inability: None    Transportation needs     Medical: None     Non-medical: None   Tobacco Use    Smoking status: Former Smoker     Packs/day: 0.25     Years: 9.00     Pack years: 2.25     Types: Cigarettes     Quit date: 2017     Years since quittin.0    Smokeless tobacco: Never Used   Substance and Sexual Activity    Alcohol use: No     Comment: occasional     Drug use: No    Sexual activity: Yes     Partners: Male   Lifestyle    Physical activity     Days per week: None     Minutes per session: None    Stress: None   Relationships    Social connections     Talks on phone: None     Gets together: None     Attends Gnosticism service: None     Active member of club or organization: None     Attends meetings of clubs or organizations: None     Relationship status: None    Intimate partner violence     Fear of current or ex partner: None     Emotionally abused: None     Physically abused: None     Forced sexual activity: None   Other Topics Concern    None   Social History Narrative    None       SCREENINGS               PHYSICAL EXAM    (up to 7 for level 4, 8 or more for level 5)     ED Triage Vitals [21 1637]   BP Temp Temp Source Pulse Resp SpO2 Height Weight   132/89 98.5 °F (36.9 °C) Oral 94 16 99 % 5' 2\" (1.575 m) 190 lb (86.2 kg)       Physical Exam  Vitals signs and nursing note reviewed. Constitutional:       General: She is not in acute distress. Appearance: Normal appearance. She is not ill-appearing, toxic-appearing or diaphoretic. HENT:      Head: Normocephalic and atraumatic.       Right Ear: Tympanic membrane, ear canal and external ear normal.      Left Ear: Tympanic membrane, ear canal and external ear normal.      Nose: Nose normal. No congestion or rhinorrhea. Mouth/Throat:      Mouth: Mucous membranes are moist.   Eyes:      Pupils: Pupils are equal, round, and reactive to light. Neck:      Musculoskeletal: Normal range of motion. Cardiovascular:      Rate and Rhythm: Normal rate and regular rhythm. Pulses: Normal pulses. Heart sounds: Normal heart sounds. Pulmonary:      Effort: Pulmonary effort is normal.      Breath sounds: Normal breath sounds. Abdominal:      General: Abdomen is flat. Musculoskeletal:         General: No swelling, tenderness, deformity or signs of injury. Lymphadenopathy:      Cervical: Cervical adenopathy present. Left cervical: Deep cervical adenopathy present. No superficial cervical adenopathy. Upper Body:      Left upper body: Supraclavicular adenopathy present. Skin:     General: Skin is warm. Neurological:      General: No focal deficit present. Mental Status: She is alert. Cranial Nerves: No cranial nerve deficit. Motor: No weakness. DIAGNOSTIC RESULTS     EKG: All EKG's are interpreted by the Emergency Department Physician who either signs or Co-signs this chart in the absence of a cardiologist.        RADIOLOGY:   Non-plain film images such as CT, Ultrasound and MRI are read by the radiologist. Plain radiographic images are visualized and preliminarily interpreted by the emergency physician with the below findings:        Interpretation per the Radiologist below, if available at the time of this note:    CT SOFT TISSUE NECK W CONTRAST   Final Result   No acute abnormality of the soft tissue structures of the neck.                  LABS:  Results for orders placed or performed during the hospital encounter of 01/28/21   CBC Auto Differential   Result Value Ref Range    WBC 8.5 4.0 - 11.0 K/uL    RBC 4.47 4.00 - 5.20 M/uL Hemoglobin 12.6 12.0 - 16.0 g/dL    Hematocrit 38.6 36.0 - 48.0 %    MCV 86.2 80.0 - 100.0 fL    MCH 28.2 26.0 - 34.0 pg    MCHC 32.7 31.0 - 36.0 g/dL    RDW 14.3 12.4 - 15.4 %    Platelets 212 027 - 547 K/uL    MPV 7.9 5.0 - 10.5 fL    Neutrophils % 56.0 %    Lymphocytes % 34.9 %    Monocytes % 6.5 %    Eosinophils % 1.6 %    Basophils % 1.0 %    Neutrophils Absolute 4.7 1.7 - 7.7 K/uL    Lymphocytes Absolute 2.9 1.0 - 5.1 K/uL    Monocytes Absolute 0.5 0.0 - 1.3 K/uL    Eosinophils Absolute 0.1 0.0 - 0.6 K/uL    Basophils Absolute 0.1 0.0 - 0.2 K/uL   Comprehensive Metabolic Panel   Result Value Ref Range    Sodium 139 136 - 145 mmol/L    Potassium 3.9 3.5 - 5.1 mmol/L    Chloride 106 99 - 110 mmol/L    CO2 27 21 - 32 mmol/L    Anion Gap 6 3 - 16    Glucose 92 70 - 99 mg/dL    BUN 11 7 - 20 mg/dL    CREATININE 0.7 0.6 - 1.1 mg/dL    GFR Non-African American >60 >60    GFR African American >60 >60    Calcium 9.3 8.3 - 10.6 mg/dL    Total Protein 7.5 6.4 - 8.2 g/dL    Albumin 4.4 3.4 - 5.0 g/dL    Albumin/Globulin Ratio 1.4 1.1 - 2.2    Total Bilirubin <0.2 0.0 - 1.0 mg/dL    Alkaline Phosphatase 62 40 - 129 U/L    ALT 13 10 - 40 U/L    AST 12 (L) 15 - 37 U/L    Globulin 3.1 g/dL   Sedimentation Rate   Result Value Ref Range    Sed Rate 9 0 - 20 mm/Hr   Mononucleosis Screen   Result Value Ref Range    Mono Test Negative Negative            EMERGENCY DEPARTMENT COURSE and DIFFERENTIAL DIAGNOSIS/MDM:     Vitals:    01/28/21 1637   BP: 132/89   Pulse: 94   Resp: 16   Temp: 98.5 °F (36.9 °C)   TempSrc: Oral   SpO2: 99%   Weight: 190 lb (86.2 kg)   Height: 5' 2\" (1.575 m)           MDM      REASSESSMENT          CRITICAL CARE TIME     CONSULTS:  None      PROCEDURES:     Procedures    MEDICATIONS GIVEN THIS VISIT:  Medications   0.9 % sodium chloride infusion (has no administration in time range)   iopamidol (ISOVUE-370) 76 % injection 75 mL (75 mLs Intravenous Given 1/28/21 1820)        FINAL IMPRESSION      1.  Neck pain        Patient has no signs of any abnormal lymph nodes to suggest a carcinoma  She has no abnormal blood work both CBC, Monospot, or sed rate to suggest mononucleosis  She does not nor has she been around any cats to suggest cat scratch fever so at this point I think a conservative approach is recommended her CT as mentioned is negative there is no signs of airway compromise she is advised to follow-up with her ear nose and throat physician she is advised on Tylenol Advil conservative approach of course with the caveat that if there is any worsening at all to immediately return    DISPOSITION/PLAN   DISPOSITION Decision To Discharge 01/28/2021 06:40:07 PM      PATIENT REFERRED TO:  DO Juan Carlos Shaver [address]    In 1 week        DISCHARGE MEDICATIONS:  New Prescriptions    No medications on file       Controlled Substances Monitoring  RX Monitoring 6/19/2018   Attestation The Prescription Monitoring Report for this patient was reviewed today. Periodic Controlled Substance Monitoring Possible medication side effects, risk of tolerance/dependence & alternative treatments discussed. (Please note that portions of this note were completed with a voice recognition program.  Efforts were made to edit the dictations but occasionally words are mis-transcribed.)    Patient was advised to return to the Emergency Department if there was any worsening.     Davie Gao MD (electronically signed)  Attending Emergency Physician         Marcos Gomez MD  01/28/21 0582

## 2021-01-28 NOTE — ED TRIAGE NOTES
Presents with c/o left side neck pain x 2.5 months. States her PCP recommended she see ENT. Saw Dr. Marta Yip last week and was scheduled for outpatient CT neck for next week. States she is still having symptoms and chose to come to ER sooner.

## 2021-04-08 ENCOUNTER — HOSPITAL ENCOUNTER (OUTPATIENT)
Dept: CT IMAGING | Age: 36
Discharge: HOME | End: 2021-04-08
Payer: MEDICAID

## 2021-04-08 DIAGNOSIS — R10.9 RT FLANK PAIN: ICD-10-CM

## 2021-04-08 PROCEDURE — 74176 CT ABD & PELVIS W/O CONTRAST: CPT

## 2021-04-27 NOTE — PROGRESS NOTES
Gynecologic History  Menarche at age 11/12    She delivered her first child at age 26, and did not breastfeed  Premenopausal  No hysterectomy  Oral contraceptive use: yes for 2 years and is not currently taking  Hormone use: no and is not currently taking    Bra Size: 45 D      Review of Systems   Constitutional: Positive for unexpected weight change (Has gained weight- States she may be depressed). Eyes: Negative for visual disturbance. Respiratory: Negative for cough and shortness of breath. Cardiovascular: Positive for chest pain (A-fib- Has a cardiaologist) and palpitations (A-fib- Does see a cardiologist). Gastrointestinal: Positive for abdominal pain (Does see OB/GYN for ovrian/ uterine issues). Musculoskeletal: Negative for arthralgias and myalgias. Neurological: Negative for headaches. Hematological: Negative for adenopathy. Does not bruise/bleed easily. Psychiatric/Behavioral: Negative for dysphoric mood. The patient is nervous/anxious (Medication for anxiety/ OCD).

## 2021-04-30 ENCOUNTER — HOSPITAL ENCOUNTER (OUTPATIENT)
Dept: WOMENS IMAGING | Age: 36
Discharge: HOME | End: 2021-04-30
Payer: MEDICAID

## 2021-04-30 ENCOUNTER — OFFICE VISIT (OUTPATIENT)
Dept: SURGERY | Age: 36
End: 2021-04-30
Payer: MEDICAID

## 2021-04-30 VITALS
HEART RATE: 98 BPM | OXYGEN SATURATION: 98 % | DIASTOLIC BLOOD PRESSURE: 83 MMHG | WEIGHT: 212 LBS | SYSTOLIC BLOOD PRESSURE: 120 MMHG | RESPIRATION RATE: 16 BRPM | TEMPERATURE: 97.6 F | HEIGHT: 62 IN | BODY MASS INDEX: 39.01 KG/M2

## 2021-04-30 DIAGNOSIS — N64.4 BREAST PAIN: ICD-10-CM

## 2021-04-30 DIAGNOSIS — Z91.89 AT HIGH RISK FOR BREAST CANCER: ICD-10-CM

## 2021-04-30 DIAGNOSIS — Z12.31 ENCOUNTER FOR SCREENING MAMMOGRAM FOR BREAST CANCER: ICD-10-CM

## 2021-04-30 DIAGNOSIS — N64.52 DISCHARGE FROM LEFT NIPPLE: ICD-10-CM

## 2021-04-30 DIAGNOSIS — N64.4 BREAST PAIN: Primary | ICD-10-CM

## 2021-04-30 DIAGNOSIS — Z80.3 FAMILY HISTORY OF BREAST CANCER: ICD-10-CM

## 2021-04-30 PROCEDURE — 77066 DX MAMMO INCL CAD BI: CPT

## 2021-04-30 PROCEDURE — 99205 OFFICE O/P NEW HI 60 MIN: CPT | Performed by: SURGERY

## 2021-04-30 RX ORDER — OMEPRAZOLE 20 MG/1
20 CAPSULE, DELAYED RELEASE ORAL DAILY
COMMUNITY
End: 2022-03-16

## 2021-04-30 ASSESSMENT — ENCOUNTER SYMPTOMS
ABDOMINAL PAIN: 1
SHORTNESS OF BREATH: 0
COUGH: 0

## 2021-05-01 NOTE — PROGRESS NOTES
21    CHIEF COMPLAINT:  Evaluation of left breast pain    HISTORY OF PRESENT ILLNESS:  Ruth Gastelum is a 29 y.o. woman who requested that I evaluate her for left breast pain. The pain first started approximately 4 years ago. She saw another breast surgeon at that time. She reports constant pain which has worsened lately. The pain worsens with activities and certain bras (especially underwires as opposed to sports bras). She takes Tylenol 500 BID for the pain. She does not take NSAIDs due to her cardiac history. She is a non smoker. She does not drink caffeine. The patient states that she herself had not noticed any abnormal masses in either breast.  She denies any change in the appearance of her breasts or the skin of her breasts. She denies bilateral nipple discharge. She has no other systemic complaints and otherwise feels well today. Pertinent Family History: Her maternal grandmother was diagnosed with breast cancer at age 48. One of her 4 maternal aunts was diagnosed with breast cancer at an unknown age. Two of her three paternal aunts were diagnosed with breast cancer in their 45s (possibly 36 and 40).     GYNECOLOGIC HISTORY:  Menarche at age 11/12    She delivered her first child at age 26, and did not breastfeed  Premenopausal  No hysterectomy  Oral contraceptive use: yes for 2 years and is not currently taking  Hormone use: no and is not currently taking    Past Medical History:   Diagnosis Date    Allergic rhinitis     Ankle fracture, right     Anxiety     Anxiety     Cardiac arrhythmia due to congenital heart disease     COPD (chronic obstructive pulmonary disease) (HCC)     Depression     Dizziness     Endometriosis     GERD (gastroesophageal reflux disease)     Kidney stone     OCD (obsessive compulsive disorder)     Ovarian cyst     Recurrent upper respiratory infection (URI)     Sinus arrhythmia      Past Surgical History:   Procedure Laterality Date     SECTION      FRACTURE SURGERY      right ankle    OTHER SURGICAL HISTORY  10/16/2018    excision lower abdominal mass    SD OFFICE/OUTPT VISIT,PROCEDURE ONLY N/A 10/16/2018    EXCISION ABDOMINAL WALL MASS performed by Troy Sierra MD at [facility]     Current Outpatient Medications   Medication Sig Dispense Refill    omeprazole (PRILOSEC) 20 MG delayed release capsule Take 20 mg by mouth daily      sertraline (ZOLOFT) 50 MG tablet Take 50 mg by mouth nightly      clonazePAM (KLONOPIN) 0.5 MG tablet Take 1 tablet by mouth every 6 hours as needed for Anxiety (Patient taking differently: Take 1 mg by mouth every 6 hours as needed for Anxiety ) 1 tablet 0     No current facility-administered medications for this visit.       Allergies   Allergen Reactions    Bactrim Ds [Sulfamethoxazole-Trimethoprim] Anaphylaxis    Duloxetine Anaphylaxis    Ethinyl Estradiol     Norgestimate     Other      States she is allergic to orthotrycycline    Sulfa Antibiotics      Social History     Socioeconomic History    Marital status:      Spouse name: Not on file    Number of children: Not on file    Years of education: Not on file    Highest education level: Not on file   Occupational History    Not on file   Social Needs    Financial resource strain: Not on file    Food insecurity     Worry: Not on file     Inability: Not on file    Transportation needs     Medical: Not on file     Non-medical: Not on file   Tobacco Use    Smoking status: Former Smoker     Packs/day: 0.25     Years: 9.00     Pack years: 2.25     Types: Cigarettes     Quit date: 2017     Years since quittin.3    Smokeless tobacco: Never Used   Substance and Sexual Activity    Alcohol use: No     Comment: occasional     Drug use: No    Sexual activity: Yes     Partners: Male   Lifestyle    Physical activity     Days per week: Not on file     Minutes per session: Not on file    Stress: Not on file   Relationships    General: Well-developed, well-nourished, in no apparent distress. Eyes:  Conjunctivae appear normal. Pupils are equal and reactive. Extraocular movements are intact. The sclerae are not injected and show no jaundice. Nose, Mouth and Throat:  Patient is wearing a mask. Neck: Supple, without thyromegaly or adenopathy. Respiratory: Normal respiratory effort, clear to auscultation bilaterally. Cardiovascular: Regular rate and rhythm. No lower extremity edema. Gastrointestinal: Soft, nontender, nondistended, without obvious masses or hernias. Musculoskeletal: Normal gait and range of motion in all 4 extremities. Psychiatric: Alert and oriented x 3. Appropriate affect and behavior for today's visit. Skin: No concerning rashes, lesions, nodules or other skin changes. Lymphatic System:  No concerning cervical, supraclavicular or axillary lymphadenopathy. Breast Exam:  The breasts are normal in contour. Bra size 38D. Evidence of prior nipple piercing bilaterally. Right Breast: Examination of the right breast in the upright and supine positions reveal no obvious masses, skin changes, dimpling, or retraction. The nipple and areola are without erosion, edema or ulceration. There is no obvious nipple discharge. There is no concerning axillary adenopathy. Left Breast: Examination of the left breast in the upright and supine positions reveal no obvious masses, skin changes, dimpling, or retraction. The nipple and areola are without erosion, edema or ulceration. There is thick white nipple discharge evident with compression. This is expressed from the center of the nipple as well as at 3:00 and 9:00 from the sites of her previous nipple piercing. There is no concerning axillary adenopathy. There is tenderness to palpation along the lower and outer breast as well as along her left lateral chest wall.     IMAGING: I personally reviewed the breast imaging performed from today which I ordered and discussed this the youngest affected family member (but not before age 27), annual screening MRI beginning 8 years prior to youngest affected family member (but not before age 22), and clinical breast exams every 6-12 months. We discussed the role of MRI scanning, its high sensitivity but also high false positive rate, and its importance as an adjunct in following patients at increased risk. In general, an MRI if it is performed, will be done on the alternate 6 months from the mammogram.  We also stressed the importance of breast awareness. Self breast evaluation was reviewed. We discussed the option of risk reduction surgery including prophylactic mastectomies with or without reconstruction. Additionally, I made it quite clear that although this reduces a woman's risk greater than 90-95%, we cannot be guaranteed that she will never develop a breast cancer in the future. We discussed the role of chemoprophylaxis in women with an increased risk of breast cancer. Data regarding tamoxifen risk reduction is limited to pre-and postmenopausal women 28years of age or older with a Liana Sebastian model 5 year breast cancer risk of greater than or equal to 1.7% or a history of LCIS. Other risk reduction strategies were also fully discussed. We recommend the following general healthy life-style choices: maintenance of ideal body weight and body mass index (BMI), limiting alcohol intake, regular exercise several times a week for at least 30 minutes, and smoking cessation. We also discussed the risk of a hereditary cancer syndrome based on her family history, and the role of genetic counseling and testing. Based on her risk, we will refer her for counseling +/- testing. At this time, she prefers careful surveillance. Therefore I would recommend alternating both mammography and MRI. I will plan for an MRI in six months and will contact her with those results.   I would then plan to see her back in one year when she will be due for her bilateral screening mammograms. In the interim, I encouraged her to continue her self examinations on a monthly basis and to alert me of any changes. I answered all of her questions thoroughly, and she does seem pleased with this plan of approach. I encouraged her to contact me in the interim if any new questions or concerns arise. Approximately 60 minutes were spent on today's encounter including the following nonpatient facing activities: preparing to see the patient and reviewing records, individual interpretation of imaging results, ordering of unique tests, medications, or procedures and documentation within the EHR.     Summary:  - Breast MRI in 6 months - I will call her with the results  - Follow up in 1 year with bilateral mammograms  - Referral to genetics    Jada Rodriguez MD

## 2021-05-03 ENCOUNTER — TELEPHONE (OUTPATIENT)
Dept: SURGERY | Age: 36
End: 2021-05-03

## 2021-05-03 NOTE — TELEPHONE ENCOUNTER
Call placed to this patient to speak with her about genetics referral. Per Dr. Barbra Oliveros, referral should be sent to [facility] instead of [facility] due to private pay status. If patient calls back, please advise her of this.

## 2021-05-20 ENCOUNTER — APPOINTMENT (OUTPATIENT)
Dept: GENERAL RADIOLOGY | Age: 36
End: 2021-05-20
Payer: MEDICAID

## 2021-05-20 ENCOUNTER — HOSPITAL ENCOUNTER (EMERGENCY)
Age: 36
Discharge: HOME | End: 2021-05-20
Attending: EMERGENCY MEDICINE
Payer: MEDICAID

## 2021-05-20 VITALS
OXYGEN SATURATION: 98 % | TEMPERATURE: 99.3 F | HEIGHT: 62 IN | HEART RATE: 121 BPM | BODY MASS INDEX: 31.83 KG/M2 | WEIGHT: 173 LBS | RESPIRATION RATE: 22 BRPM | SYSTOLIC BLOOD PRESSURE: 153 MMHG | DIASTOLIC BLOOD PRESSURE: 91 MMHG

## 2021-05-20 DIAGNOSIS — S89.92XA INJURY OF LEFT KNEE, INITIAL ENCOUNTER: Primary | ICD-10-CM

## 2021-05-20 PROCEDURE — 99284 EMERGENCY DEPT VISIT MOD MDM: CPT

## 2021-05-20 PROCEDURE — 93005 ELECTROCARDIOGRAM TRACING: CPT | Performed by: EMERGENCY MEDICINE

## 2021-05-20 PROCEDURE — 73560 X-RAY EXAM OF KNEE 1 OR 2: CPT

## 2021-05-20 PROCEDURE — 6370000000 HC RX 637 (ALT 250 FOR IP): Performed by: EMERGENCY MEDICINE

## 2021-05-20 PROCEDURE — 73552 X-RAY EXAM OF FEMUR 2/>: CPT

## 2021-05-20 PROCEDURE — 73590 X-RAY EXAM OF LOWER LEG: CPT

## 2021-05-20 RX ORDER — IBUPROFEN 400 MG/1
400 TABLET ORAL ONCE
Status: COMPLETED | OUTPATIENT
Start: 2021-05-20 | End: 2021-05-20

## 2021-05-20 RX ORDER — IBUPROFEN 400 MG/1
400 TABLET ORAL EVERY 8 HOURS PRN
Qty: 15 TABLET | Refills: 0 | Status: SHIPPED | OUTPATIENT
Start: 2021-05-20 | End: 2021-07-06 | Stop reason: ALTCHOICE

## 2021-05-20 RX ORDER — OXYCODONE HYDROCHLORIDE AND ACETAMINOPHEN 5; 325 MG/1; MG/1
1 TABLET ORAL ONCE
Status: COMPLETED | OUTPATIENT
Start: 2021-05-20 | End: 2021-05-20

## 2021-05-20 RX ORDER — OXYCODONE HYDROCHLORIDE AND ACETAMINOPHEN 5; 325 MG/1; MG/1
1-2 TABLET ORAL EVERY 6 HOURS PRN
Qty: 10 TABLET | Refills: 0 | Status: SHIPPED | OUTPATIENT
Start: 2021-05-20 | End: 2021-05-23

## 2021-05-20 RX ADMIN — OXYCODONE HYDROCHLORIDE AND ACETAMINOPHEN 1 TABLET: 5; 325 TABLET ORAL at 14:18

## 2021-05-20 RX ADMIN — IBUPROFEN 400 MG: 400 TABLET, FILM COATED ORAL at 16:48

## 2021-05-20 ASSESSMENT — ENCOUNTER SYMPTOMS
SHORTNESS OF BREATH: 0
DIARRHEA: 0
VOMITING: 0
TROUBLE SWALLOWING: 0
NAUSEA: 0
VOICE CHANGE: 0

## 2021-05-20 ASSESSMENT — PAIN DESCRIPTION - LOCATION
LOCATION: KNEE
LOCATION: LEG

## 2021-05-20 ASSESSMENT — PAIN DESCRIPTION - DESCRIPTORS
DESCRIPTORS: ACHING;SHARP;SHOOTING
DESCRIPTORS: ACHING

## 2021-05-20 ASSESSMENT — PAIN SCALES - GENERAL: PAINLEVEL_OUTOF10: 6

## 2021-05-20 ASSESSMENT — PAIN DESCRIPTION - PAIN TYPE
TYPE: ACUTE PAIN
TYPE: ACUTE PAIN

## 2021-05-20 ASSESSMENT — PAIN DESCRIPTION - FREQUENCY: FREQUENCY: CONTINUOUS

## 2021-05-20 ASSESSMENT — PAIN DESCRIPTION - PROGRESSION: CLINICAL_PROGRESSION: GRADUALLY WORSENING

## 2021-05-20 NOTE — ED PROVIDER NOTES
MEDICAL HISTORY     Past Medical History:   Diagnosis Date    Allergic rhinitis     Ankle fracture, right     Anxiety     Anxiety     Cardiac arrhythmia due to congenital heart disease     COPD (chronic obstructive pulmonary disease) (HCC)     Depression     Dizziness     Endometriosis     GERD (gastroesophageal reflux disease)     Kidney stone     OCD (obsessive compulsive disorder)     Ovarian cyst     Recurrent upper respiratory infection (URI)     Sinus arrhythmia          SURGICAL HISTORY       Past Surgical History:   Procedure Laterality Date     SECTION      FRACTURE SURGERY      right ankle    OTHER SURGICAL HISTORY  10/16/2018    excision lower abdominal mass    MD OFFICE/OUTPT VISIT,PROCEDURE ONLY N/A 10/16/2018    EXCISION ABDOMINAL WALL MASS performed by Letty Zuleta MD at [address] [facility] [address]       Previous Medications    CLONAZEPAM (KLONOPIN) 0.5 MG TABLET    Take 1 tablet by mouth every 6 hours as needed for Anxiety    OMEPRAZOLE (PRILOSEC) 20 MG DELAYED RELEASE CAPSULE    Take 20 mg by mouth daily    SERTRALINE (ZOLOFT) 50 MG TABLET    Take 50 mg by mouth nightly       ALLERGIES     Bactrim ds [sulfamethoxazole-trimethoprim], Duloxetine, Ethinyl estradiol, Norgestimate, Other, and Sulfa antibiotics    FAMILY HISTORY       Family History   Problem Relation Age of Onset    Arthritis Mother     High Blood Pressure Mother     Heart Disease Father     Heart Disease Maternal Grandmother     Breast Cancer Maternal Grandmother 48    Breast Cancer Maternal Aunt     Breast Cancer Paternal Aunt 36    Breast Cancer Paternal Aunt 40          SOCIAL HISTORY       Social History     Socioeconomic History    Marital status:      Spouse name: None    Number of children: None    Years of education: None    Highest education level: None   Occupational History    None   Tobacco Use    Smoking status: Former Smoker     Packs/day: 0.25     Years: 9.00 explained the differential including occult fracture or soft tissue injury such as ligament or meniscus injury. I feel the patient is appropriate for a knee immobilizer, crutches, nonweightbearing status on left lower extremity, and orthopedic surgery clinic follow-up. Strict ER return precautions given for new or worsening symptoms such as uncontrolled pain, numbness, or discoloration. The patient expresses understanding and agreement with this plan and is discharged home. I estimate there is low risk for COMPARTMENT SYNDROME, DEEP VENOUS THROMBOSIS, SEPTIC ARTHRITIS, TENDON OR NEUROVASCULAR INJURY, thus I consider the discharge disposition reasonable. The patient and I have discussed the diagnosis and risks, and we agree with discharging home to follow-up with their primary doctor or the referral orthopedist. We also discussed returning to the Emergency Department immediately if new or worsening symptoms occur. We have discussed the symptoms which are most concerning (e.g., changing or worsening pain, numbness, weakness) that necessitate immediate return           Procedures    FINAL IMPRESSION      1. Injury of left knee, initial encounter          DISPOSITION/PLAN   DISPOSITION Decision To Discharge 05/20/2021 04:29:25 PM      PATIENT REFERRED TO:  Candida Moura MD  [facility]  [phone]    In 4 days      [facility]  [address] [place] [address],[address]  [phone]    If symptoms worsen      DISCHARGE MEDICATIONS:  New Prescriptions    IBUPROFEN (ADVIL;MOTRIN) 400 MG TABLET    Take 1 tablet by mouth every 8 hours as needed for Pain    OXYCODONE-ACETAMINOPHEN (PERCOCET) 5-325 MG PER TABLET    Take 1-2 tablets by mouth every 6 hours as needed for Pain for up to 3 days. WARNING:  May cause drowsiness. May impair ability to operate vehicles or machinery. Do not use in combination with alcohol.           (Please note that portions of this note were completed with a voice recognition program.  Efforts were made to edit the dictations but occasionally words aremis-transcribed. )    Harvin Spurling, MD (electronically signed)  Attending Emergency Physician           Harvin Spurling, MD  05/20/21 1640

## 2021-05-21 LAB
EKG ATRIAL RATE: 99 BPM
EKG DIAGNOSIS: NORMAL
EKG P AXIS: 58 DEGREES
EKG P-R INTERVAL: 124 MS
EKG Q-T INTERVAL: 340 MS
EKG QRS DURATION: 82 MS
EKG QTC CALCULATION (BAZETT): 436 MS
EKG R AXIS: 62 DEGREES
EKG T AXIS: 23 DEGREES
EKG VENTRICULAR RATE: 99 BPM

## 2021-05-21 PROCEDURE — 93010 ELECTROCARDIOGRAM REPORT: CPT | Performed by: INTERNAL MEDICINE

## 2021-05-25 ENCOUNTER — TELEPHONE (OUTPATIENT)
Dept: ORTHOPEDIC SURGERY | Age: 36
End: 2021-05-25

## 2021-05-25 ENCOUNTER — OFFICE VISIT (OUTPATIENT)
Dept: ORTHOPEDIC SURGERY | Age: 36
End: 2021-05-25
Payer: MEDICAID

## 2021-05-25 VITALS — WEIGHT: 173 LBS | HEIGHT: 62 IN | BODY MASS INDEX: 31.83 KG/M2

## 2021-05-25 DIAGNOSIS — M25.562 LEFT KNEE PAIN, UNSPECIFIED CHRONICITY: Primary | ICD-10-CM

## 2021-05-25 PROCEDURE — 99203 OFFICE O/P NEW LOW 30 MIN: CPT | Performed by: ORTHOPAEDIC SURGERY

## 2021-05-25 RX ORDER — METHYLPREDNISOLONE 4 MG/1
TABLET ORAL
Qty: 1 KIT | Refills: 0 | Status: SHIPPED | OUTPATIENT
Start: 2021-05-25 | End: 2021-05-31

## 2021-05-25 NOTE — TELEPHONE ENCOUNTER
Prescription Refill     Medication Name:  [address]: [address] [facility]  Patient Contact Number:  [phone]    PATIENT WAS JUST IN TO SEE DR. JACQUES FOR HER LT KNEE AND FORGOT TO ASK THE DOCTOR FOR A PRESCRIPTION FOR HER KNEE PAIN.     PLEASE CALL BACK PATIENT AT THE ABOVE NUMBER

## 2021-05-26 NOTE — PROGRESS NOTES
2021     Reason for visit:  Left knee pain    History of Present Illness: The patient is a 21-year-old female who presents for evaluation of her left knee. She reports she injured herself while using a lawnmower at home. The lawnmower was stuck and she tried pushing when she felt a popping sensation as well as twisting mechanism to her left knee. Following the event she had immediate pain. Since then she has developed swelling. She has difficulty bearing weight. She also reports catching and locking with decreased range of motion.     Medical History:  Past Medical History:   Diagnosis Date    Allergic rhinitis     Ankle fracture, right     Anxiety     Anxiety     Cardiac arrhythmia due to congenital heart disease     COPD (chronic obstructive pulmonary disease) (HCC)     Depression     Dizziness     Endometriosis     GERD (gastroesophageal reflux disease)     Kidney stone     OCD (obsessive compulsive disorder)     Ovarian cyst     Recurrent upper respiratory infection (URI)     Sinus arrhythmia       Past Surgical History:   Procedure Laterality Date     SECTION      FRACTURE SURGERY      right ankle    OTHER SURGICAL HISTORY  10/16/2018    excision lower abdominal mass    SD OFFICE/OUTPT VISIT,PROCEDURE ONLY N/A 10/16/2018    EXCISION ABDOMINAL WALL MASS performed by Saulo Salazar MD at [address] OR      Family History   Problem Relation Age of Onset    Arthritis Mother     High Blood Pressure Mother     Heart Disease Father     Heart Disease Maternal Grandmother     Breast Cancer Maternal Grandmother 48    Breast Cancer Maternal Aunt     Breast Cancer Paternal Aunt 36    Breast Cancer Paternal Aunt 40      Social History     Socioeconomic History    Marital status:      Spouse name: Not on file    Number of children: Not on file    Years of education: Not on file    Highest education level: Not on file   Occupational History    Not on file   Tobacco on file prior to visit. Allergies   Allergen Reactions    Bactrim Ds [Sulfamethoxazole-Trimethoprim] Anaphylaxis    Duloxetine Anaphylaxis    Ethinyl Estradiol     Norgestimate     Other      States she is allergic to orthotrycycline    Sulfa Antibiotics         Review of Systems:  Constitutional: Patient is adequately groomed with no evidence of malnutrition  Mental Status: The patient is oriented to time, place and person. The patient's mood and affect are appropriate. Lymphatic: The lymphatic examination bilaterally reveals all areas to be without enlargement or induration. Vascular: Examination reveals no swelling or calf tenderness. Peripheral pulses are palpable and 2+. Neurological: The patient has good coordination. There is no weakness or sensory deficit. Skin:  Head/Neck: inspection reveals no rashes, ulcerations or lesions. Trunk: inspection reveals no rashes, ulcerations or lesions. Objective:  Ht 5' 2\" (1.575 m)   Wt 173 lb (78.5 kg)   BMI 31.64 kg/m²      Physical Exam:  The patient is well-appearing and in no apparent distress  Examination of the left knee   There is a moderate effusion, no gross deformity or skin changes  Range of motion reveals 10 to 80  Difficult to assess ligamentous stability given effusion and guarding  5 out of 5 strength throughout distal muscle groups  Sensation is intact to light touch throughout all distributions  There is no calf swelling or tenderness  Palpable DP pulse, brisk cap refill, 2+ symmetric reflexes    Imagin views of the left knee in the form of a sunrise and tunnel view of the left knee were reviewed and obtained in the office today on 2021. There is no fracture or dislocation. No other abnormality. Additional right knee comparison views were obtained and demonstrate no abnormality. Assessment:  Left knee injury sustained on 2021.   Concern for ligamentous or meniscus injury    Plan:  I discussed with the patient the differential diagnosis. This point I would recommend an MRI for further evaluation. Patient is in agreement. Following the study she will return to the office to review the results and discuss definitive treatment options. Until then she will continue to ice the knee and use crutches as needed. Andrés Herbert MD            Orthopaedic Surgery Sports Medicine and [address] [facility] and [address] [facility] Physician [facility] ([place])      Disclaimer: This note was dictated with voice recognition software. Though review and correction are routine, we apologize for any errors.

## 2021-06-04 ENCOUNTER — HOSPITAL ENCOUNTER (OUTPATIENT)
Dept: MRI IMAGING | Age: 36
Discharge: HOME | End: 2021-06-04
Payer: MEDICAID

## 2021-06-04 DIAGNOSIS — M25.562 LEFT KNEE PAIN, UNSPECIFIED CHRONICITY: ICD-10-CM

## 2021-06-04 PROCEDURE — 73721 MRI JNT OF LWR EXTRE W/O DYE: CPT

## 2021-06-08 ENCOUNTER — TELEPHONE (OUTPATIENT)
Dept: ORTHOPEDIC SURGERY | Age: 36
End: 2021-06-08

## 2021-06-08 ENCOUNTER — OFFICE VISIT (OUTPATIENT)
Dept: ORTHOPEDIC SURGERY | Age: 36
End: 2021-06-08
Payer: MEDICAID

## 2021-06-08 VITALS — BODY MASS INDEX: 31.83 KG/M2 | TEMPERATURE: 98.5 F | WEIGHT: 173 LBS | HEIGHT: 62 IN

## 2021-06-08 DIAGNOSIS — S83.512A COMPLETE TEAR OF ANTERIOR CRUCIATE LIGAMENT OF LEFT KNEE, INITIAL ENCOUNTER: Primary | ICD-10-CM

## 2021-06-08 PROCEDURE — 99215 OFFICE O/P EST HI 40 MIN: CPT | Performed by: ORTHOPAEDIC SURGERY

## 2021-06-09 NOTE — PROGRESS NOTES
2021     Reason for visit:  Left knee pain    History of Present Illness: The patient is a 15-year-old female who presents for evaluation of her left knee. She reports she injured herself while using a lawnmower at home. The lawnmower was stuck and she tried pushing when she felt a popping sensation as well as twisting mechanism to her left knee. Following the event she had immediate pain. Since then she has developed swelling. She has difficulty bearing weight. She also reports catching and locking with decreased range of motion. I last visit we elected proceed with MRI. She is here to review the results today. Objective:  Temp 98.5 °F (36.9 °C)   Ht 5' 2\" (1.575 m)   Wt 173 lb (78.5 kg)   BMI 31.64 kg/m²      Physical Exam:  The patient is well-appearing and in no apparent distress  Examination of the left knee   There is a moderate effusion, no gross deformity or skin changes  Range of motion reveals 5 to 120  Laxity with Lachman without a firm endpoint  5 out of 5 strength throughout distal muscle groups  Sensation is intact to light touch throughout all distributions  There is no calf swelling or tenderness  Palpable DP pulse, brisk cap refill, 2+ symmetric reflexes    Imagin views of the left knee in the form of a sunrise and tunnel view of the left knee were reviewed and obtained in the office today on 2021. There is no fracture or dislocation. No other abnormality. Additional right knee comparison views were obtained and demonstrate no abnormality. MRI of the left knee was reviewed. Complete ACL tear is present. Bone contusion pattern of the lateral compartment.     Assessment:  Left knee injury sustained on 2021 resulting in ACL tear    Plan:  I had a very long discussion with the patient and . Saniya Omer spent time reviewing the MRI findings. Chilo David her age and activity level would recommend surgical intervention in the form of left knee ACL reconstruction.  We thoroughly discussed what this involves as well as the postoperative rehabilitation. Emily Lopez discussed graft options.  We discussed autograft and allograft. We discussed the various types of autograft.  Will will proceed with BTB.   I would also evaluate the medial lateral meniscus structures.  If the tear is encountered we would appropriately perform either partial meniscectomy versus repair.  The risks of the surgery were defined as but not limited to infection, bleeding, damage to blood vessels or nerves, need for additional surgery, medical complications, graft failure.  She does understand elects proceed.      Greater than 60 minutes were spent with this encounter. Time spent included evaluating the patient's chart prior to arrival.  Evaluating the patient in the office including history, physical examination, imaging reviewing, and counseling on next steps. Lastly, time was spent discussing orders with my staff as well as providing documentation in the chart. Helen Castellanos MD            Orthopaedic Surgery Sports Medicine and [address] and [address] [facility] Physician [facility] ([place])      Disclaimer: This note was dictated with voice recognition software. Though review and correction are routine, we apologize for any errors.

## 2021-06-16 ENCOUNTER — ANESTHESIA EVENT (OUTPATIENT)
Dept: OPERATING ROOM | Age: 36
End: 2021-06-16
Payer: MEDICAID

## 2021-06-21 ENCOUNTER — HOSPITAL ENCOUNTER (OUTPATIENT)
Age: 36
Discharge: HOME | End: 2021-06-21
Payer: MEDICAID

## 2021-06-21 LAB — SARS-COV-2, NAAT: NOT DETECTED

## 2021-06-21 PROCEDURE — 87635 SARS-COV-2 COVID-19 AMP PRB: CPT

## 2021-06-22 ENCOUNTER — HOSPITAL ENCOUNTER (OUTPATIENT)
Age: 36
Setting detail: OUTPATIENT SURGERY
Discharge: HOME | End: 2021-06-22
Attending: ORTHOPAEDIC SURGERY | Admitting: ORTHOPAEDIC SURGERY
Payer: MEDICAID

## 2021-06-22 ENCOUNTER — TELEPHONE (OUTPATIENT)
Dept: ORTHOPEDIC SURGERY | Age: 36
End: 2021-06-22

## 2021-06-22 ENCOUNTER — ANESTHESIA (OUTPATIENT)
Dept: OPERATING ROOM | Age: 36
End: 2021-06-22
Payer: MEDICAID

## 2021-06-22 VITALS
WEIGHT: 180 LBS | TEMPERATURE: 98 F | SYSTOLIC BLOOD PRESSURE: 142 MMHG | BODY MASS INDEX: 33.13 KG/M2 | OXYGEN SATURATION: 98 % | HEIGHT: 62 IN | HEART RATE: 98 BPM | RESPIRATION RATE: 12 BRPM | DIASTOLIC BLOOD PRESSURE: 90 MMHG

## 2021-06-22 VITALS
TEMPERATURE: 98.6 F | SYSTOLIC BLOOD PRESSURE: 144 MMHG | RESPIRATION RATE: 20 BRPM | OXYGEN SATURATION: 96 % | DIASTOLIC BLOOD PRESSURE: 83 MMHG

## 2021-06-22 DIAGNOSIS — Z98.890 HISTORY OF REPAIR OF ANTERIOR CRUCIATE LIGAMENT OF LEFT KNEE: ICD-10-CM

## 2021-06-22 DIAGNOSIS — S83.512A COMPLETE TEAR OF ANTERIOR CRUCIATE LIGAMENT OF LEFT KNEE, INITIAL ENCOUNTER: Primary | ICD-10-CM

## 2021-06-22 DIAGNOSIS — S83.512A COMPLETE TEAR OF ANTERIOR CRUCIATE LIGAMENT OF LEFT KNEE, INITIAL ENCOUNTER: ICD-10-CM

## 2021-06-22 DIAGNOSIS — G89.18 POST-OP PAIN: ICD-10-CM

## 2021-06-22 LAB — PREGNANCY, URINE: NEGATIVE

## 2021-06-22 PROCEDURE — 6360000002 HC RX W HCPCS

## 2021-06-22 PROCEDURE — 2580000003 HC RX 258: Performed by: ANESTHESIOLOGY

## 2021-06-22 PROCEDURE — 3600000004 HC SURGERY LEVEL 4 BASE: Performed by: ORTHOPAEDIC SURGERY

## 2021-06-22 PROCEDURE — 84703 CHORIONIC GONADOTROPIN ASSAY: CPT

## 2021-06-22 PROCEDURE — 2720000010 HC SURG SUPPLY STERILE: Performed by: ORTHOPAEDIC SURGERY

## 2021-06-22 PROCEDURE — 7100000011 HC PHASE II RECOVERY - ADDTL 15 MIN: Performed by: ORTHOPAEDIC SURGERY

## 2021-06-22 PROCEDURE — 2500000003 HC RX 250 WO HCPCS: Performed by: ORTHOPAEDIC SURGERY

## 2021-06-22 PROCEDURE — 2500000003 HC RX 250 WO HCPCS: Performed by: ANESTHESIOLOGY

## 2021-06-22 PROCEDURE — 7100000010 HC PHASE II RECOVERY - FIRST 15 MIN: Performed by: ORTHOPAEDIC SURGERY

## 2021-06-22 PROCEDURE — 7100000001 HC PACU RECOVERY - ADDTL 15 MIN: Performed by: ORTHOPAEDIC SURGERY

## 2021-06-22 PROCEDURE — 7100000000 HC PACU RECOVERY - FIRST 15 MIN: Performed by: ORTHOPAEDIC SURGERY

## 2021-06-22 PROCEDURE — 6360000002 HC RX W HCPCS: Performed by: ORTHOPAEDIC SURGERY

## 2021-06-22 PROCEDURE — C1713 ANCHOR/SCREW BN/BN,TIS/BN: HCPCS | Performed by: ORTHOPAEDIC SURGERY

## 2021-06-22 PROCEDURE — 3700000000 HC ANESTHESIA ATTENDED CARE: Performed by: ORTHOPAEDIC SURGERY

## 2021-06-22 PROCEDURE — 3700000001 HC ADD 15 MINUTES (ANESTHESIA): Performed by: ORTHOPAEDIC SURGERY

## 2021-06-22 PROCEDURE — 2500000003 HC RX 250 WO HCPCS

## 2021-06-22 PROCEDURE — 6360000002 HC RX W HCPCS: Performed by: ANESTHESIOLOGY

## 2021-06-22 PROCEDURE — C9290 INJ, BUPIVACAINE LIPOSOME: HCPCS | Performed by: ORTHOPAEDIC SURGERY

## 2021-06-22 PROCEDURE — 2709999900 HC NON-CHARGEABLE SUPPLY: Performed by: ORTHOPAEDIC SURGERY

## 2021-06-22 PROCEDURE — 3600000014 HC SURGERY LEVEL 4 ADDTL 15MIN: Performed by: ORTHOPAEDIC SURGERY

## 2021-06-22 PROCEDURE — 2580000003 HC RX 258: Performed by: ORTHOPAEDIC SURGERY

## 2021-06-22 DEVICE — KIT ACL ANT CRUCE LIG CANN RUL PEN DRL PIN GWIRE RETRCT: Type: IMPLANTABLE DEVICE | Status: FUNCTIONAL

## 2021-06-22 DEVICE — SCREW INTFR L23MM DIA9MM BIOCRYL RAPIDE ABSRB MILAGRO ADV: Type: IMPLANTABLE DEVICE | Status: FUNCTIONAL

## 2021-06-22 DEVICE — SCREW INTFR L23MM DIA7MM BIOCRYL RAPIDE ABSRB MILAGRO ADV: Type: IMPLANTABLE DEVICE | Status: FUNCTIONAL

## 2021-06-22 RX ORDER — SODIUM CHLORIDE 9 MG/ML
INJECTION INTRAVENOUS
Status: DISCONTINUED
Start: 2021-06-22 | End: 2021-06-22 | Stop reason: HOSPADM

## 2021-06-22 RX ORDER — ONDANSETRON 4 MG/1
4 TABLET, FILM COATED ORAL EVERY 8 HOURS PRN
Qty: 21 TABLET | Refills: 0 | Status: SHIPPED | OUTPATIENT
Start: 2021-06-22 | End: 2021-06-29

## 2021-06-22 RX ORDER — KETOROLAC TROMETHAMINE 30 MG/ML
INJECTION, SOLUTION INTRAMUSCULAR; INTRAVENOUS PRN
Status: DISCONTINUED | OUTPATIENT
Start: 2021-06-22 | End: 2021-06-22 | Stop reason: SDUPTHER

## 2021-06-22 RX ORDER — DEXAMETHASONE SODIUM PHOSPHATE 4 MG/ML
INJECTION, SOLUTION INTRA-ARTICULAR; INTRALESIONAL; INTRAMUSCULAR; INTRAVENOUS; SOFT TISSUE PRN
Status: DISCONTINUED | OUTPATIENT
Start: 2021-06-22 | End: 2021-06-22 | Stop reason: SDUPTHER

## 2021-06-22 RX ORDER — MIDAZOLAM HYDROCHLORIDE 1 MG/ML
INJECTION INTRAMUSCULAR; INTRAVENOUS
Status: COMPLETED
Start: 2021-06-22 | End: 2021-06-22

## 2021-06-22 RX ORDER — LABETALOL HYDROCHLORIDE 5 MG/ML
5 INJECTION, SOLUTION INTRAVENOUS EVERY 10 MIN PRN
Status: DISCONTINUED | OUTPATIENT
Start: 2021-06-22 | End: 2021-06-22 | Stop reason: HOSPADM

## 2021-06-22 RX ORDER — HYDROCODONE BITARTRATE AND ACETAMINOPHEN 10; 325 MG/1; MG/1
1 TABLET ORAL EVERY 4 HOURS PRN
Qty: 30 TABLET | Refills: 0 | Status: SHIPPED | OUTPATIENT
Start: 2021-06-22 | End: 2021-06-28

## 2021-06-22 RX ORDER — OXYCODONE HYDROCHLORIDE AND ACETAMINOPHEN 5; 325 MG/1; MG/1
1 TABLET ORAL PRN
Status: DISCONTINUED | OUTPATIENT
Start: 2021-06-22 | End: 2021-06-22 | Stop reason: HOSPADM

## 2021-06-22 RX ORDER — HYDROCODONE BITARTRATE AND ACETAMINOPHEN 10; 325 MG/1; MG/1
1 TABLET ORAL EVERY 4 HOURS PRN
Qty: 30 TABLET | Refills: 0 | Status: ON HOLD | OUTPATIENT
Start: 2021-06-22 | End: 2021-06-22 | Stop reason: SDUPTHER

## 2021-06-22 RX ORDER — LIDOCAINE HYDROCHLORIDE 10 MG/ML
1 INJECTION, SOLUTION EPIDURAL; INFILTRATION; INTRACAUDAL; PERINEURAL
Status: COMPLETED | OUTPATIENT
Start: 2021-06-22 | End: 2021-06-22

## 2021-06-22 RX ORDER — BUPIVACAINE HYDROCHLORIDE 2.5 MG/ML
INJECTION, SOLUTION EPIDURAL; INFILTRATION; INTRACAUDAL
Status: DISCONTINUED
Start: 2021-06-22 | End: 2021-06-22 | Stop reason: WASHOUT

## 2021-06-22 RX ORDER — MORPHINE SULFATE 2 MG/ML
2 INJECTION, SOLUTION INTRAMUSCULAR; INTRAVENOUS EVERY 5 MIN PRN
Status: DISCONTINUED | OUTPATIENT
Start: 2021-06-22 | End: 2021-06-22 | Stop reason: HOSPADM

## 2021-06-22 RX ORDER — BUPIVACAINE HYDROCHLORIDE 2.5 MG/ML
INJECTION, SOLUTION EPIDURAL; INFILTRATION; INTRACAUDAL
Status: DISCONTINUED
Start: 2021-06-22 | End: 2021-06-22 | Stop reason: HOSPADM

## 2021-06-22 RX ORDER — SODIUM CHLORIDE 0.9 % (FLUSH) 0.9 %
10 SYRINGE (ML) INJECTION PRN
Status: DISCONTINUED | OUTPATIENT
Start: 2021-06-22 | End: 2021-06-22 | Stop reason: HOSPADM

## 2021-06-22 RX ORDER — ONDANSETRON 4 MG/1
4 TABLET, FILM COATED ORAL EVERY 8 HOURS PRN
Qty: 21 TABLET | Refills: 0 | Status: ON HOLD | OUTPATIENT
Start: 2021-06-22 | End: 2021-06-22 | Stop reason: SDUPTHER

## 2021-06-22 RX ORDER — SODIUM CHLORIDE 0.9 % (FLUSH) 0.9 %
10 SYRINGE (ML) INJECTION EVERY 12 HOURS SCHEDULED
Status: DISCONTINUED | OUTPATIENT
Start: 2021-06-22 | End: 2021-06-22 | Stop reason: HOSPADM

## 2021-06-22 RX ORDER — MIDAZOLAM HYDROCHLORIDE 1 MG/ML
1 INJECTION INTRAMUSCULAR; INTRAVENOUS ONCE
Status: DISCONTINUED | OUTPATIENT
Start: 2021-06-22 | End: 2021-06-22 | Stop reason: HOSPADM

## 2021-06-22 RX ORDER — KETOROLAC TROMETHAMINE 30 MG/ML
INJECTION, SOLUTION INTRAMUSCULAR; INTRAVENOUS
Status: COMPLETED
Start: 2021-06-22 | End: 2021-06-22

## 2021-06-22 RX ORDER — FENTANYL CITRATE 50 UG/ML
INJECTION, SOLUTION INTRAMUSCULAR; INTRAVENOUS PRN
Status: DISCONTINUED | OUTPATIENT
Start: 2021-06-22 | End: 2021-06-22 | Stop reason: SDUPTHER

## 2021-06-22 RX ORDER — SODIUM CHLORIDE, SODIUM LACTATE, POTASSIUM CHLORIDE, CALCIUM CHLORIDE 600; 310; 30; 20 MG/100ML; MG/100ML; MG/100ML; MG/100ML
INJECTION, SOLUTION INTRAVENOUS CONTINUOUS
Status: DISCONTINUED | OUTPATIENT
Start: 2021-06-22 | End: 2021-06-22 | Stop reason: HOSPADM

## 2021-06-22 RX ORDER — SODIUM CHLORIDE 9 MG/ML
INJECTION INTRAVENOUS
Status: DISCONTINUED
Start: 2021-06-22 | End: 2021-06-22 | Stop reason: WASHOUT

## 2021-06-22 RX ORDER — ASPIRIN 325 MG
325 TABLET, DELAYED RELEASE (ENTERIC COATED) ORAL 2 TIMES DAILY
Qty: 28 TABLET | Refills: 0 | Status: ON HOLD | OUTPATIENT
Start: 2021-06-22 | End: 2021-06-22 | Stop reason: SDUPTHER

## 2021-06-22 RX ORDER — PROPOFOL 10 MG/ML
INJECTION, EMULSION INTRAVENOUS PRN
Status: DISCONTINUED | OUTPATIENT
Start: 2021-06-22 | End: 2021-06-22 | Stop reason: SDUPTHER

## 2021-06-22 RX ORDER — OXYCODONE HYDROCHLORIDE AND ACETAMINOPHEN 5; 325 MG/1; MG/1
2 TABLET ORAL PRN
Status: DISCONTINUED | OUTPATIENT
Start: 2021-06-22 | End: 2021-06-22 | Stop reason: HOSPADM

## 2021-06-22 RX ORDER — LIDOCAINE HYDROCHLORIDE 20 MG/ML
INJECTION, SOLUTION INFILTRATION; PERINEURAL PRN
Status: DISCONTINUED | OUTPATIENT
Start: 2021-06-22 | End: 2021-06-22 | Stop reason: SDUPTHER

## 2021-06-22 RX ORDER — MORPHINE SULFATE 2 MG/ML
1 INJECTION, SOLUTION INTRAMUSCULAR; INTRAVENOUS EVERY 5 MIN PRN
Status: DISCONTINUED | OUTPATIENT
Start: 2021-06-22 | End: 2021-06-22 | Stop reason: HOSPADM

## 2021-06-22 RX ORDER — DIPHENHYDRAMINE HYDROCHLORIDE 50 MG/ML
12.5 INJECTION INTRAMUSCULAR; INTRAVENOUS
Status: DISCONTINUED | OUTPATIENT
Start: 2021-06-22 | End: 2021-06-22 | Stop reason: HOSPADM

## 2021-06-22 RX ORDER — ESMOLOL HYDROCHLORIDE 10 MG/ML
INJECTION INTRAVENOUS PRN
Status: DISCONTINUED | OUTPATIENT
Start: 2021-06-22 | End: 2021-06-22 | Stop reason: SDUPTHER

## 2021-06-22 RX ORDER — HYDRALAZINE HYDROCHLORIDE 20 MG/ML
5 INJECTION INTRAMUSCULAR; INTRAVENOUS EVERY 10 MIN PRN
Status: DISCONTINUED | OUTPATIENT
Start: 2021-06-22 | End: 2021-06-22 | Stop reason: HOSPADM

## 2021-06-22 RX ORDER — ONDANSETRON 2 MG/ML
4 INJECTION INTRAMUSCULAR; INTRAVENOUS PRN
Status: DISCONTINUED | OUTPATIENT
Start: 2021-06-22 | End: 2021-06-22 | Stop reason: HOSPADM

## 2021-06-22 RX ORDER — ACETAMINOPHEN 120 MG/1
1000 SUPPOSITORY RECTAL 2 TIMES DAILY
Status: ON HOLD | COMMUNITY
End: 2021-06-22 | Stop reason: HOSPADM

## 2021-06-22 RX ORDER — PROMETHAZINE HYDROCHLORIDE 25 MG/ML
6.25 INJECTION, SOLUTION INTRAMUSCULAR; INTRAVENOUS
Status: DISCONTINUED | OUTPATIENT
Start: 2021-06-22 | End: 2021-06-22 | Stop reason: HOSPADM

## 2021-06-22 RX ORDER — ONDANSETRON 2 MG/ML
INJECTION INTRAMUSCULAR; INTRAVENOUS PRN
Status: DISCONTINUED | OUTPATIENT
Start: 2021-06-22 | End: 2021-06-22 | Stop reason: SDUPTHER

## 2021-06-22 RX ORDER — ASPIRIN 325 MG
325 TABLET, DELAYED RELEASE (ENTERIC COATED) ORAL 2 TIMES DAILY
Qty: 28 TABLET | Refills: 0 | Status: SHIPPED | OUTPATIENT
Start: 2021-06-22 | End: 2022-02-16

## 2021-06-22 RX ORDER — KETOROLAC TROMETHAMINE 30 MG/ML
15 INJECTION, SOLUTION INTRAMUSCULAR; INTRAVENOUS ONCE
Status: COMPLETED | OUTPATIENT
Start: 2021-06-22 | End: 2021-06-22

## 2021-06-22 RX ORDER — SODIUM CHLORIDE 9 MG/ML
25 INJECTION, SOLUTION INTRAVENOUS PRN
Status: DISCONTINUED | OUTPATIENT
Start: 2021-06-22 | End: 2021-06-22 | Stop reason: HOSPADM

## 2021-06-22 RX ORDER — MIDAZOLAM HYDROCHLORIDE 1 MG/ML
2 INJECTION INTRAMUSCULAR; INTRAVENOUS ONCE
Status: COMPLETED | OUTPATIENT
Start: 2021-06-22 | End: 2021-06-22

## 2021-06-22 RX ADMIN — LIDOCAINE HYDROCHLORIDE 100 MG: 20 INJECTION, SOLUTION INFILTRATION; PERINEURAL at 15:28

## 2021-06-22 RX ADMIN — ONDANSETRON 4 MG: 2 INJECTION, SOLUTION INTRAMUSCULAR; INTRAVENOUS at 15:28

## 2021-06-22 RX ADMIN — MIDAZOLAM 1 MG: 1 INJECTION INTRAMUSCULAR; INTRAVENOUS at 18:17

## 2021-06-22 RX ADMIN — DEXAMETHASONE SODIUM PHOSPHATE 8 MG: 4 INJECTION, SOLUTION INTRAMUSCULAR; INTRAVENOUS at 15:28

## 2021-06-22 RX ADMIN — ESMOLOL HYDROCHLORIDE 20 MG: 10 INJECTION, SOLUTION INTRAVENOUS at 16:03

## 2021-06-22 RX ADMIN — PROPOFOL 200 MG: 10 INJECTION, EMULSION INTRAVENOUS at 15:28

## 2021-06-22 RX ADMIN — KETOROLAC TROMETHAMINE 15 MG: 30 INJECTION, SOLUTION INTRAMUSCULAR at 17:37

## 2021-06-22 RX ADMIN — ESMOLOL HYDROCHLORIDE 30 MG: 10 INJECTION, SOLUTION INTRAVENOUS at 16:22

## 2021-06-22 RX ADMIN — Medication 2000 MG: at 15:28

## 2021-06-22 RX ADMIN — SODIUM CHLORIDE, POTASSIUM CHLORIDE, SODIUM LACTATE AND CALCIUM CHLORIDE: 600; 310; 30; 20 INJECTION, SOLUTION INTRAVENOUS at 14:15

## 2021-06-22 RX ADMIN — FENTANYL CITRATE 50 MCG: 50 INJECTION INTRAMUSCULAR; INTRAVENOUS at 15:34

## 2021-06-22 RX ADMIN — FENTANYL CITRATE 50 MCG: 50 INJECTION INTRAMUSCULAR; INTRAVENOUS at 15:44

## 2021-06-22 RX ADMIN — MIDAZOLAM HYDROCHLORIDE 2 MG: 1 INJECTION INTRAMUSCULAR; INTRAVENOUS at 15:14

## 2021-06-22 RX ADMIN — MORPHINE SULFATE 2 MG: 2 INJECTION, SOLUTION INTRAMUSCULAR; INTRAVENOUS at 18:35

## 2021-06-22 RX ADMIN — KETOROLAC TROMETHAMINE 15 MG: 30 INJECTION, SOLUTION INTRAMUSCULAR; INTRAVENOUS at 18:27

## 2021-06-22 RX ADMIN — ESMOLOL HYDROCHLORIDE 30 MG: 10 INJECTION, SOLUTION INTRAVENOUS at 16:14

## 2021-06-22 RX ADMIN — LIDOCAINE HYDROCHLORIDE 100 MG: 20 INJECTION, SOLUTION INFILTRATION; PERINEURAL at 17:02

## 2021-06-22 RX ADMIN — LIDOCAINE HYDROCHLORIDE 1 ML: 10 INJECTION, SOLUTION EPIDURAL; INFILTRATION; INTRACAUDAL; PERINEURAL at 14:14

## 2021-06-22 RX ADMIN — MIDAZOLAM HYDROCHLORIDE 1 MG: 1 INJECTION INTRAMUSCULAR; INTRAVENOUS at 18:17

## 2021-06-22 RX ADMIN — MIDAZOLAM 2 MG: 1 INJECTION INTRAMUSCULAR; INTRAVENOUS at 15:14

## 2021-06-22 RX ADMIN — ESMOLOL HYDROCHLORIDE 10 MG: 10 INJECTION, SOLUTION INTRAVENOUS at 15:58

## 2021-06-22 RX ADMIN — MORPHINE SULFATE 2 MG: 2 INJECTION, SOLUTION INTRAMUSCULAR; INTRAVENOUS at 18:13

## 2021-06-22 RX ADMIN — SUGAMMADEX 200 MG: 100 INJECTION, SOLUTION INTRAVENOUS at 17:37

## 2021-06-22 ASSESSMENT — PULMONARY FUNCTION TESTS
PIF_VALUE: 33
PIF_VALUE: 30
PIF_VALUE: 31
PIF_VALUE: 30
PIF_VALUE: 34
PIF_VALUE: 30
PIF_VALUE: 29
PIF_VALUE: 7
PIF_VALUE: 33
PIF_VALUE: 30
PIF_VALUE: 33
PIF_VALUE: 30
PIF_VALUE: 29
PIF_VALUE: 27
PIF_VALUE: 30
PIF_VALUE: 32
PIF_VALUE: 30
PIF_VALUE: 31
PIF_VALUE: 30
PIF_VALUE: 31
PIF_VALUE: 33
PIF_VALUE: 31
PIF_VALUE: 30
PIF_VALUE: 33
PIF_VALUE: 30
PIF_VALUE: 31
PIF_VALUE: 30
PIF_VALUE: 30
PIF_VALUE: 8
PIF_VALUE: 30
PIF_VALUE: 29
PIF_VALUE: 29
PIF_VALUE: 3
PIF_VALUE: 30
PIF_VALUE: 8
PIF_VALUE: 3
PIF_VALUE: 31
PIF_VALUE: 35
PIF_VALUE: 28
PIF_VALUE: 34
PIF_VALUE: 30
PIF_VALUE: 31
PIF_VALUE: 31
PIF_VALUE: 30
PIF_VALUE: 33
PIF_VALUE: 30
PIF_VALUE: 30
PIF_VALUE: 33
PIF_VALUE: 30
PIF_VALUE: 32
PIF_VALUE: 30
PIF_VALUE: 28
PIF_VALUE: 9
PIF_VALUE: 21
PIF_VALUE: 33
PIF_VALUE: 31
PIF_VALUE: 31
PIF_VALUE: 3
PIF_VALUE: 8
PIF_VALUE: 31
PIF_VALUE: 33
PIF_VALUE: 30
PIF_VALUE: 31
PIF_VALUE: 31
PIF_VALUE: 30
PIF_VALUE: 30
PIF_VALUE: 8
PIF_VALUE: 30
PIF_VALUE: 30
PIF_VALUE: 3
PIF_VALUE: 10
PIF_VALUE: 30
PIF_VALUE: 34
PIF_VALUE: 30
PIF_VALUE: 30
PIF_VALUE: 8
PIF_VALUE: 30
PIF_VALUE: 32
PIF_VALUE: 4
PIF_VALUE: 32
PIF_VALUE: 31
PIF_VALUE: 30
PIF_VALUE: 30
PIF_VALUE: 0
PIF_VALUE: 30
PIF_VALUE: 30
PIF_VALUE: 29
PIF_VALUE: 30
PIF_VALUE: 30
PIF_VALUE: 32
PIF_VALUE: 30
PIF_VALUE: 29
PIF_VALUE: 31
PIF_VALUE: 30
PIF_VALUE: 33
PIF_VALUE: 30
PIF_VALUE: 9
PIF_VALUE: 34
PIF_VALUE: 30
PIF_VALUE: 36
PIF_VALUE: 30
PIF_VALUE: 33
PIF_VALUE: 30
PIF_VALUE: 31
PIF_VALUE: 0
PIF_VALUE: 30
PIF_VALUE: 31
PIF_VALUE: 0
PIF_VALUE: 34
PIF_VALUE: 30
PIF_VALUE: 30
PIF_VALUE: 10
PIF_VALUE: 30
PIF_VALUE: 36
PIF_VALUE: 31
PIF_VALUE: 30
PIF_VALUE: 33
PIF_VALUE: 33
PIF_VALUE: 30
PIF_VALUE: 30
PIF_VALUE: 27
PIF_VALUE: 33
PIF_VALUE: 34
PIF_VALUE: 30
PIF_VALUE: 30
PIF_VALUE: 31
PIF_VALUE: 30
PIF_VALUE: 30
PIF_VALUE: 12
PIF_VALUE: 30
PIF_VALUE: 35

## 2021-06-22 ASSESSMENT — PAIN DESCRIPTION - LOCATION
LOCATION: KNEE

## 2021-06-22 ASSESSMENT — PAIN SCALES - GENERAL
PAINLEVEL_OUTOF10: 7
PAINLEVEL_OUTOF10: 9
PAINLEVEL_OUTOF10: 8
PAINLEVEL_OUTOF10: 10

## 2021-06-22 ASSESSMENT — PAIN DESCRIPTION - DESCRIPTORS: DESCRIPTORS: CONSTANT;ACHING;BURNING

## 2021-06-22 ASSESSMENT — PAIN DESCRIPTION - PAIN TYPE
TYPE: SURGICAL PAIN

## 2021-06-22 ASSESSMENT — PAIN DESCRIPTION - ORIENTATION
ORIENTATION: LEFT

## 2021-06-22 ASSESSMENT — PAIN - FUNCTIONAL ASSESSMENT
PAIN_FUNCTIONAL_ASSESSMENT: 0-10
PAIN_FUNCTIONAL_ASSESSMENT: PREVENTS OR INTERFERES WITH ALL ACTIVE AND SOME PASSIVE ACTIVITIES

## 2021-06-22 NOTE — H&P
what this involves as well as the postoperative rehabilitation. Saniya Omer discussed graft options.  We discussed autograft and allograft. We discussed the various types of autograft.  Will will proceed with BTB.   I would also evaluate the medial lateral meniscus structures.  If the tear is encountered we would appropriately perform either partial meniscectomy versus repair.  The risks of the surgery were defined as but not limited to infection, bleeding, damage to blood vessels or nerves, need for additional surgery, medical complications, graft failure.  She does understand elects proceed.

## 2021-06-22 NOTE — OP NOTE
Operative Note      Patient: Bryan Canales  YOB: 1985  MRN: [id number]    Date of Procedure: 6/22/2021    Pre-Op Diagnosis: LEFT KNEE ANTERIOR CRUCIATE LIGAMENT RUPTURE    Post-Op Diagnosis: Same       Procedure(s):  LEFT KNEE VIDEO ARTHROSCOPY, ANTERIOR CRUCIATE LIGAMENT RECONSTRUCTION WITH BONE TO BONE AUTOGRAFT    Surgeon(s):  Niles Ambrose MD    Assistant:   Surgical Assistant: Josep Mcgarry    Anesthesia: General    Estimated Blood Loss (mL): Minimal    Complications: None    Specimens:   * No specimens in log *    Implants:  * No implants in log *      Drains: * No LDAs found *    Findings: per dictation    Detailed Description of Procedure:    The patient is an 30year-old female with a chronic ACL tear. She injured her rightt knee following a twisting mechanism.  She experienced a pop followed by swelling and instability.  On exam she had laxity with Lachman and a ACL tear on MRI.  We discussed operative and nonoperative treatment options.  We elected proceed with surgical intervention in the form of the right knee arthroscopy with ACL reconstruction utilizing BTB autograft.  Please refer to my clinic note for full discussion regarding risk and benefits and treatment options.     The patient was met in previous holding area.  The surgical site was identified marked.  Informed consent was obtained. Yeni Mendez was taken back to the operative room and placed on table in supine position.  General anesthesia was performed.  Examination under anesthesia demonstrated laxity with Lachman without a firm endpoint, positive pivot shift examination, no laxity with varus or valgus stress at 0 degrees or 30 degrees of flexion, negative dial test, stable posterior drawer. The lower extremity was prepped draped using sterile technique.  A leg parmar was utilized and the table was lowered.  Contralateral extremity was well-padded and an SCD was placed.  At that point a formal timeout was performed which correct with a 9.5 mm reamer.  Excess bone fragments were removed.  7 sutures placed.  Tibial guide was set to 55 degrees.  We placed a guidepin in the desired position based on remnants of native ACL and the anterior horn of the lateral meniscus.  We are pleased with the position.  Pravin performed the 10 mm reamer.  Slightly suture was retrieved and the graft was successfully passed engaged in the femoral socket.  Fixation of femoral side was achieved with a 8 x 23 mm advance Richa interference groove by [employer]  The graft was cycled.  Tibial fixation was achieved in full extension with tension on the graft myself and a posterior drawer by my assistant. Natalie Riddles was achieved with a 9 x 23 mm advance [place] interference screw by [employer].  Excellent purchase was obtained. Erinn Quan is grade 1A Lachman.  There is no evidence of graft impingement and came to full extension and flexion.  Tourniquet was deflated.  Hemostasis was achieved.  100 mL of Ortho mix was injected into the soft tissues.  Fascial layer was closed using #1 Vicryl over the tibial tunnel.  Subtenons layer was closed using 2-0 Vicryl in buried fashion.  Skin was closed using 3-0 nylon in both a running and interrupted fashion.  Sterile dressings were provided in form of Xeroform, 4 x 4's, ABD, Ace.  She was placed in a hinged knee brace.  Successfully waking from anesthesia and taken recovery room in excellent condition.  I did not appreciate all counts correct.  I was present for all aspects of the case.  Postoperative plan.  The patient will be weight-bear as tolerated.  Start physical therapy immediately.  Return to the office in 2 weeks.  Norco for pain control.  Aspirin for DVT prophylaxis.   Electronically signed by Ifrah Tinoco MD on 6/22/2021 at 5:45 PM

## 2021-06-22 NOTE — PROGRESS NOTES
Pt resting in bed, drinking soda, states \"still hurts but I want to go home\", vitals stable, will move into phase 2

## 2021-06-22 NOTE — ANESTHESIA PRE PROCEDURE
Department of Anesthesiology  Preprocedure Note       Name:  Sherel Ahumada   Age:  29 y.o.  :  1985                                          MRN:  [id number]         Date:  2021      Surgeon: Obed Saunders):  Arturo Ash MD    Procedure: Procedure(s):  LEFT KNEE VIDEO ARTHROSCOPY, ANTERIOR CRUCIATE LIGAMENT RECONSTRUCTION WITH BONE TO BONE AUTOGRAFT    Medications prior to admission:   Prior to Admission medications    Medication Sig Start Date End Date Taking? Authorizing Provider   HYDROcodone-acetaminophen (NORCO)  MG per tablet Take 1 tablet by mouth every 4 hours as needed for Pain for up to 30 doses.  Intended supply: 7 days 21 Yes Arturo Ash MD   acetaminophen (TYLENOL) 120 MG suppository Place 1,000 mg rectally 2 times daily   Yes Historical Provider, MD   omeprazole (PRILOSEC) 20 MG delayed release capsule Take 20 mg by mouth daily   Yes Historical Provider, MD   sertraline (ZOLOFT) 50 MG tablet Take 50 mg by mouth nightly   Yes Historical Provider, MD   clonazePAM (KLONOPIN) 0.5 MG tablet Take 1 tablet by mouth every 6 hours as needed for Anxiety  Patient taking differently: Take 1 mg by mouth every 6 hours as needed for Anxiety  17  Yes Austen Lovell MD   ondansetron (ZOFRAN) 4 MG tablet Take 1 tablet by mouth every 8 hours as needed for Nausea or Vomiting 21  Arturo Ash MD   aspirin 325 MG EC tablet Take 1 tablet by mouth 2 times daily for 14 days 21  Arturo Ash MD   diclofenac (VOLTAREN) 50 MG EC tablet Take 1 tablet by mouth 2 times daily (with meals) 21   Arturo Ash MD   ibuprofen (ADVIL;MOTRIN) 400 MG tablet Take 1 tablet by mouth every 8 hours as needed for Pain 21  Brie Veliz MD       Current medications:    Current Facility-Administered Medications   Medication Dose Route Frequency Provider Last Rate Last Admin    lactated ringers infusion   Intravenous Continuous Shawn Woods Cynthia Aguayo  mL/hr at 21 1415 New Bag at 21 1415    sodium chloride flush 0.9 % injection 10 mL  10 mL Intravenous 2 times per day Shavonne Allen MD        sodium chloride flush 0.9 % injection 10 mL  10 mL Intravenous PRN Shavonne Allen MD        0.9 % sodium chloride infusion  25 mL Intravenous PRN Shavonne Allen MD        ceFAZolin (ANCEF) 2000 mg in sterile water 20 mL IV syringe  2,000 mg Intravenous Once Dandre Steinberg MD           Allergies: Allergies   Allergen Reactions    Bactrim Ds [Sulfamethoxazole-Trimethoprim] Anaphylaxis    Duloxetine Anaphylaxis    Hydromorphone Hcl      Other reaction(s): (moderate to severe)    Ethinyl Estradiol     Norgestimate     Other      States she is allergic to orthotrycycline    Sulfa Antibiotics     Trimethoprim      Other reaction(s): Trouble Breathing       Problem List:    Patient Active Problem List   Diagnosis Code    Hx of syncope & presyncope R55    Hx of palpitations R00.2    ANDREINA (generalized anxiety disorder) F41.1    Former smoker Z87.891    Obesity (BMI 30-39. 9) E66.9    Abdominal wall mass R22.2       Past Medical History:        Diagnosis Date    Allergic rhinitis     Ankle fracture, right     Anxiety     Anxiety     Cardiac arrhythmia due to congenital heart disease     COPD (chronic obstructive pulmonary disease) (HCC)     Depression     Dizziness     Endometriosis     GERD (gastroesophageal reflux disease)     Kidney stone     OCD (obsessive compulsive disorder)     Ovarian cyst     Recurrent upper respiratory infection (URI)     Sinus arrhythmia        Past Surgical History:        Procedure Laterality Date     SECTION      FRACTURE SURGERY      right ankle    OTHER SURGICAL HISTORY  10/16/2018    excision lower abdominal mass    CO OFFICE/OUTPT VISIT,PROCEDURE ONLY N/A 10/16/2018    EXCISION ABDOMINAL WALL MASS performed by Ramona Vega MD at [place] [address] History:    Social History     Tobacco Use    Smoking status: Former Smoker     Packs/day: 0.25     Years: 9.00     Pack years: 2.25     Types: Cigarettes     Quit date: 2017     Years since quittin.4    Smokeless tobacco: Never Used   Substance Use Topics    Alcohol use: Yes     Comment: occasional                                 Counseling given: Not Answered      Vital Signs (Current):   Vitals:    21 1316   BP: 119/70   Pulse: 84   Resp: 17   Temp: 96.5 °F (35.8 °C)   TempSrc: Temporal   SpO2: 99%   Weight: 180 lb (81.6 kg)   Height: 5' 2\" (1.575 m)                                              BP Readings from Last 3 Encounters:   21 119/70   21 (!) 153/91   21 120/83       NPO Status: Time of last liquid consumption:                         Time of last solid consumption:                         Date of last liquid consumption: 21                        Date of last solid food consumption: 21    BMI:   Wt Readings from Last 3 Encounters:   21 180 lb (81.6 kg)   21 173 lb (78.5 kg)   21 173 lb (78.5 kg)     Body mass index is 32.92 kg/m². CBC:   Lab Results   Component Value Date    WBC 8.5 2021    RBC 4.47 2021    HGB 12.6 2021    HCT 38.6 2021    MCV 86.2 2021    RDW 14.3 2021     2021       CMP:   Lab Results   Component Value Date     2021    K 3.9 2021    K 4.1 2020     2021    CO2 27 2021    BUN 11 2021    CREATININE 0.7 2021    GFRAA >60 2021    GFRAA >60 10/15/2011    AGRATIO 1.4 2021    LABGLOM >60 2021    GLUCOSE 92 2021    PROT 7.5 2021    PROT 7.9 10/15/2011    CALCIUM 9.3 2021    BILITOT <0.2 2021    ALKPHOS 62 2021    AST 12 2021    ALT 13 2021       POC Tests: No results for input(s): POCGLU, POCNA, POCK, POCCL, POCBUN, POCHEMO, POCHCT in the last 72 hours. Coags:   Lab Results   Component Value Date    PROTIME 12.9 07/27/2018    INR 1.14 07/27/2018    APTT 32.2 07/27/2018       HCG (If Applicable):   Lab Results   Component Value Date    PREGTESTUR Negative 06/22/2021        ABGs: No results found for: PHART, PO2ART, AXN8TUP, LCY0BTI, BEART, J4QYOKSL     Type & Screen (If Applicable):  No results found for: LABABO, LABRH    Drug/Infectious Status (If Applicable):  No results found for: HIV, HEPCAB    COVID-19 Screening (If Applicable):   Lab Results   Component Value Date    COVID19 Not Detected 06/21/2021           Anesthesia Evaluation  Patient summary reviewed no history of anesthetic complications:   Airway: Mallampati: II  TM distance: >3 FB   Neck ROM: full  Mouth opening: > = 3 FB Dental: normal exam         Pulmonary:Negative Pulmonary ROS and normal exam  breath sounds clear to auscultation  (+) COPD:                             Cardiovascular:Negative CV ROS  Exercise tolerance: good (>4 METS),   (+) dysrhythmias: atrial fibrillation,         Rhythm: regular  Rate: normal                    Neuro/Psych:   Negative Neuro/Psych ROS  (+) psychiatric history:            GI/Hepatic/Renal: Neg GI/Hepatic/Renal ROS  (+) GERD: well controlled,           Endo/Other: Negative Endo/Other ROS                    Abdominal:           Vascular: negative vascular ROS. Pre-Operative Diagnosis: LEFT KNEE ANTERIOR CRUCIATE LIGAMENT RUPTURE    29 y.o.   BMI:  Body mass index is 32.92 kg/m².      Vitals:    06/22/21 1316   BP: 119/70   Pulse: 84   Resp: 17   Temp: 96.5 °F (35.8 °C)   TempSrc: Temporal   SpO2: 99%   Weight: 180 lb (81.6 kg)   Height: 5' 2\" (1.575 m)       Allergies   Allergen Reactions    Bactrim Ds [Sulfamethoxazole-Trimethoprim] Anaphylaxis    Duloxetine Anaphylaxis    Hydromorphone Hcl      Other reaction(s): (moderate to severe)    Ethinyl Estradiol     Norgestimate     Other      States she is allergic to orthotrycycline

## 2021-06-22 NOTE — ANESTHESIA POSTPROCEDURE EVALUATION
Value               Date/Time                  NA                       139                 01/28/2021 05:39 PM        K                        3.9                 01/28/2021 05:39 PM        K                        4.1                 04/21/2020 07:53 PM        CL                       106                 01/28/2021 05:39 PM        CO2                      27                  01/28/2021 05:39 PM        BUN                      11                  01/28/2021 05:39 PM        CREATININE               0.7                 01/28/2021 05:39 PM        GLUCOSE                  92                  01/28/2021 05:39 PM   COAGS  Lab Results       Component                Value               Date/Time                  PROTIME                  12.9                07/27/2018 02:44 PM        INR                      1.14                07/27/2018 02:44 PM        APTT                     32.2                07/27/2018 02:44 PM     Intake & Output:  @13YYQM@    Nausea & Vomiting:  No    Level of Consciousness:  Awake    Pain Assessment:  Adequate analgesia    Anesthesia Complications:  No apparent anesthetic complications    SUMMARY      Vital signs stable  OK to discharge from Stage I post anesthesia care.   Care transferred from Anesthesiology department on discharge from perioperative area

## 2021-06-24 ENCOUNTER — CLINICAL DOCUMENTATION (OUTPATIENT)
Dept: ORTHOPEDIC SURGERY | Age: 36
End: 2021-06-24

## 2021-06-24 ENCOUNTER — TELEPHONE (OUTPATIENT)
Dept: ORTHOPEDIC SURGERY | Age: 36
End: 2021-06-24

## 2021-06-24 DIAGNOSIS — S83.512A COMPLETE TEAR OF ANTERIOR CRUCIATE LIGAMENT OF LEFT KNEE, INITIAL ENCOUNTER: ICD-10-CM

## 2021-06-24 DIAGNOSIS — Z98.890 HISTORY OF REPAIR OF ANTERIOR CRUCIATE LIGAMENT OF LEFT KNEE: Primary | ICD-10-CM

## 2021-06-24 NOTE — PROGRESS NOTES
[facility]  [facility] per pt's request.

## 2021-07-01 ENCOUNTER — TELEPHONE (OUTPATIENT)
Dept: ORTHOPEDIC SURGERY | Age: 36
End: 2021-07-01

## 2021-07-01 DIAGNOSIS — Z98.890 HISTORY OF REPAIR OF ANTERIOR CRUCIATE LIGAMENT OF LEFT KNEE: Primary | ICD-10-CM

## 2021-07-01 RX ORDER — HYDROCODONE BITARTRATE AND ACETAMINOPHEN 5; 325 MG/1; MG/1
1 TABLET ORAL EVERY 6 HOURS PRN
Qty: 30 TABLET | Refills: 0 | Status: SHIPPED | OUTPATIENT
Start: 2021-07-01 | End: 2021-07-08

## 2021-07-01 NOTE — TELEPHONE ENCOUNTER
Pt requesting to change PT to [facility] location. Order placed & patient given number to schedule. PT requesting refill on medication [facility] AND [facility]).

## 2021-07-02 ENCOUNTER — TELEPHONE (OUTPATIENT)
Dept: ORTHOPEDIC SURGERY | Age: 36
End: 2021-07-02

## 2021-07-02 NOTE — TELEPHONE ENCOUNTER
Elmer Aceves, the patient's PT, called stating that she came to her first PT appt and stated at that appt that she would not be returning for PT after that since she does not have insurance. He just wanted to make sure in case we needed to follow up with that from there.

## 2021-07-06 ENCOUNTER — OFFICE VISIT (OUTPATIENT)
Dept: ORTHOPEDIC SURGERY | Age: 36
End: 2021-07-06

## 2021-07-06 VITALS — WEIGHT: 165 LBS | BODY MASS INDEX: 29.23 KG/M2 | HEIGHT: 63 IN

## 2021-07-06 DIAGNOSIS — Z98.890 HISTORY OF REPAIR OF ANTERIOR CRUCIATE LIGAMENT OF LEFT KNEE: Primary | ICD-10-CM

## 2021-07-06 PROCEDURE — 99024 POSTOP FOLLOW-UP VISIT: CPT | Performed by: ORTHOPAEDIC SURGERY

## 2021-07-07 NOTE — PROGRESS NOTES
7/6/2021     Interval History:  Status post left knee ACL reconstruction with BTB autograft on 6/22/2021    The patient returns for postop evaluation. Overall she is doing well. She is getting physical therapy. She is using crutches and brace as instructed. No fever or chills. Medical History:  Patient's medications, allergies, past medical, surgical, social, and family histories were reviewed and updated as appropriate. Objective:  Ht 5' 3\" (1.6 m)   Wt 165 lb (74.8 kg)   LMP 06/12/2021   BMI 29.23 kg/m²      Physical Exam:  Examination of the left knee   There is a + effusion, no gross deformity or skin changes  Range of motion reveals 3 to 90  Grade 1A lachman, negative posterior drawer, no pain or laxity with varus or valgus stress at 0 degrees and 30 degrees of flexion  neg joint line tenderness  5 out of 5 strength throughout distal muscle groups  Sensation is intact to light touch throughout all distributions  There is no calf swelling or tenderness  Palpable DP pulse, brisk cap refill, 2+ symmetric reflexes    Imaging:  AP and lateral x-ray of the left knee obtained in the office on 7/6/2021 demonstrates no fracture or dislocation. Postoperative changes consistent with ACL reconstruction. Assessment:  Status post left knee ACL reconstruction with BTB autograft on 6/22/2021    Plan:  The patient is doing well. Sutures were removed. Continue physical therapy. Return in 4 weeks. Electronically signed by Amanda Grayson MD on 7/7/21 at 1:10 PM EDT     Disclaimer: This note was dictated with voice recognition software. Though review and correction are routine, we apologize for any errors.

## 2021-07-08 ENCOUNTER — HOSPITAL ENCOUNTER (OUTPATIENT)
Dept: PHYSICAL THERAPY | Age: 36
Setting detail: THERAPIES SERIES
Discharge: HOME | End: 2021-07-08
Payer: MEDICAID

## 2021-07-08 PROCEDURE — 97112 NEUROMUSCULAR REEDUCATION: CPT

## 2021-07-08 PROCEDURE — 97161 PT EVAL LOW COMPLEX 20 MIN: CPT

## 2021-07-08 PROCEDURE — 97110 THERAPEUTIC EXERCISES: CPT

## 2021-07-08 NOTE — PROGRESS NOTES
[address] [place] [address] and [facility], [address], [address] [place] [address] [address]  Phone: [phone]   Fax: [phone]    Date: 2021          Patient Name; :  Malik Acharya; 1985   Dx: Diagnosis: Z98.890 (ICD-10-CM) - History of repair of anterior cruciate ligament of left knee      Physician: Referring Practitioner: Rakel Arnold MD        Total PT Visits:      Measures Previous Current   Pain (0-10)     Disability %           Assessment:        Prognosis for POC: [] Good [] Fair  [] Poor      Patient requires continued skilled intervention: [] Yes  [] No        Plan & Recommendations:  [] Continue rehabilitation due to objective improvement and continued functional deficits with frequency and duration:   [] Progress toward  []GAP, []Work Conditioning, []Independent HEP   [] Discharge due to   [] All goals achieved, [] Maximized \"medical necessity\" [] No subjective or objective improvements      Electronically signed by:  Rose Zamora, PT  Therapy Plan of Care Re-Certification  This patient has been re-evaluated for physical therapy services and for therapy to continue, Medicare, Medicaid and other insurances require periodic physician review of the treatment plan. Please review the above re-evaluation and verify that you agree with plan of care as established above by signing the attached document and return it to our office or note changes to established plan below  [] Follow treatment plan as above [] Discontinue physical therapy  [] Change plan to:                                 __________________________________________________    Physician Signature:____________________________________ Date:____________  By signing above, therapists plan is approved by physician    If you have any questions or concerns, please don't hesitate to call.   Thank you for your referral.

## 2021-07-08 NOTE — PLAN OF CARE
[address] [facility], [facility]5 [facility], [address] [facility] [address]  Phone: [phone]   Fax:     [phone]       Physical Therapy Certification    Dear Referring Practitioner: Ebonie Angeles MD,    We had the pleasure of evaluating the following patient for physical therapy services at [address] [place] [address]. A summary of our findings can be found in the initial assessment below. This includes our plan of care. If you have any questions or concerns regarding these findings, please do not hesitate to contact me at the office phone number checked above. Thank you for the referral.       Physician Signature:_______________________________Date:__________________  By signing above (or electronic signature), therapists plan is approved by physician      Patient: Dale Lopez   : 1985   MRN: [id number]  Referring Physician: Referring Practitioner: Ebonie Angeles MD      Evaluation Date: 2021      Medical Diagnosis Information:  Diagnosis: Z98.890 (ICD-10-CM) - History of repair of anterior cruciate ligament of left knee   Treatment Diagnosis: Left knee pain, decreased ROM and strength                                         Insurance information: PT Insurance Information: Self Pay     Precautions/ Contra-indications: s/p ACL recontruction w/ patellar autograft, WBAT, no SAQ, limit deep squats first 8 weeks       Latex Allergy:  [x]NO      []YES    Preferred Language for Healthcare:   [x]English       []other:    SUBJECTIVE: Patient states was pushing lawnmower in May from getting it stuck and hurting knee. Had ACL surgery on . Relevant Medical History: None    Pain Scale: Current: 6/10; Max 10/10; Best 0/10    Easing factors: Rest    Provocative factors: Movement, more sore toward end of day.       Type: []Constant   [x]Intermittent []Radiating []Localized []other:     Numbness/Tingling: None    Occupation/School: Small business owner, [employer]. Living Status/Prior Level of Function: Independent with ADLs and IADLs. C-SSRS Triggered by Intake questionnaire (Past 2 wk assessment):   [x] No, Questionnaire did not trigger screening.   [] Yes, Patient intake triggered further evaluation      [] C-SSRS Screening completed  [] PCP notified via Plan of Care  [] Emergency services notified     OBJECTIVE:     ROM RIGHT LEFT   Hip Flexion     Hip Abduction     Hip Extension     Hip Internal Rotation     Hip External Rotation          Knee Extension  0   Knee Flexion  70        Ankle Dorsiflexion     Ankle Plantarflexion     Ankle Inversion     Ankle Eversion          Popliteal angle     Rectus femoris          Strength  RIGHT LEFT   Hip Flexors     Hip Abductors     Hip Extension     Hip External Rotation     Hip Internal Rotation          Knee Extension  NT   Knee Flexion  NT        Ankle Dorsiflexion  5   Ankle Plantarflexion  5   Ankle Inversion     Ankle Eversion            Reflexes/Sensation: NT   []Dermatomes/Myotomes intact    []Reflexes equal and normal bilaterally   []Other:    Joint mobility: NT   []Normal    []Hypo   []Hyper    Palpation: No calf tenderness    Functional Mobility/Transfers: Mod Independent    Posture: WFL    Bandages/Dressings/Incisions: Midlline incision well approximated, no s/s of DVT or infection    Gait: (include devices/WB status) Using tamera crutches    Orthopedic Special Tests: None                       [x] Patient history, allergies, meds reviewed. Medical chart reviewed. See intake form. Review Of Systems (ROS):  [x]Performed Review of systems (Integumentary, CardioPulmonary, Neurological) by intake and observation. Intake form has been scanned into medical record. Patient has been instructed to contact their primary care physician regarding ROS issues if not already being addressed at this time. Co-morbidities/Complexities (which will affect course of rehabilitation):   [x]None           Arthritic conditions   []Rheumatoid arthritis (M05.9)  []Osteoarthritis (M19.91)   Cardiovascular conditions   []Hypertension (I10)  []Hyperlipidemia (E78.5)  []Angina pectoris (I20)  []Atherosclerosis (I70)   Musculoskeletal conditions   []Disc pathology   []Congenital spine pathologies   []Prior surgical intervention  []Osteoporosis (M81.8)  []Osteopenia (M85.8)   Endocrine conditions   []Hypothyroid (E03.9)  []Hyperthyroid Gastrointestinal conditions   []Constipation (F92.12)   Metabolic conditions   []Morbid obesity (E66.01)  []Diabetes type 1(E10.65) or 2 (E11.65)   []Neuropathy (G60.9)     Pulmonary conditions   []Asthma (J45)  []Coughing   []COPD (J44.9)   Psychological Disorders  []Anxiety (F41.9)  []Depression (F32.9)   []Other:   []Other:          Barriers to/and or personal factors that will affect rehab potential:              []Age  []Sex              []Motivation/Lack of Motivation                        []Co-Morbidities              []Cognitive Function, education/learning barriers              []Environmental, home barriers              []profession/work barriers  []past PT/medical experience  []other:  Justification: None    Falls Risk Assessment (30 days):   [x] Falls Risk assessed and no intervention required. [] Falls Risk assessed and Patient requires intervention due to being higher risk   TUG score (>12s at risk):     [] Falls education provided, including           Functional Questionnaire: LEFS 76%        ASSESSMENT: Esha Hawkins is a 29 y.o. female reporting to OP PT s/p left ACL reconstruction with patellar autograft on 6/22/21. Pt is noted to have quite good extension ROM and flexion ROM is reduced. She has good distal strength , strength testing at knee deferred this date. There are no s/s of DVT or infection. She is currently WBAT. Will progress per protocol and phase of healing. Functional Impairments:     []Noted lumbar/proximal hip/LE joint hypomobility   [x]Decreased LE functional ROM   [x]Decreased core/proximal hip strength and neuromuscular control   [x]Decreased LE functional strength   [x]Reduced balance/proprioceptive control    []other:      Functional Activity Limitations (from functional questionnaire and intake)   []Reduced ability to tolerate prolonged functional positions   [x]Reduced ability or difficulty with changes of positions or transfers between positions   []Reduced ability to maintain good posture and demonstrate good body mechanics with sitting, bending, and lifting   [x]Reduced ability to sleep   [x] Reduced ability or tolerance with driving and/or computer work   [x]Reduced ability to perform lifting, carrying tasks   [x]Reduced ability to squat   []Reduced ability to forward bend   [x]Reduced ability to ambulate prolonged functional periods/distances/surfaces   [x]Reduced ability to ascend/descend stairs   [x]Reduced ability to run, hop, cut or jump   []other:    Participation Restrictions   [x]Reduced participation in self care activities   [x]Reduced participation in home management activities   [x]Reduced participation in work activities   [x]Reduced participation in social activities. []Reduced participation in sport/recreation activities. Classification :    [x]Signs/symptoms consistent with post-surgical status including decreased ROM, strength and function.    []Signs/symptoms consistent with joint sprain/strain   []Signs/symptoms consistent with patella-femoral syndrome   []Signs/symptoms consistent with knee OA/hip OA   []Signs/symptoms consistent with internal derangement of knee/Hip   []Signs/symptoms consistent with functional hip weakness/NMR control      []Signs/symptoms consistent with tendinitis/tendinosis    []signs/symptoms consistent with pathology which may benefit from Dry needling      []other:      Prognosis/Rehab Potential: []Excellent   [x]Good    []Fair   []Poor    Tolerance of evaluation/treatment:    []Excellent   [x]Good    []Fair   []Poor    Physical Therapy Evaluation Complexity Justification  [x] A history of present problem with:  [x] no personal factors and/or comorbidities that impact the plan of care;  []1-2 personal factors and/or comorbidities that impact the plan of care  []3 personal factors and/or comorbidities that impact the plan of care  [x] An examination of body systems using standardized tests and measures addressing any of the following: body structures and functions (impairments), activity limitations, and/or participation restrictions;:  [x] a total of 1-2 or more elements   [] a total of 3 or more elements   [] a total of 4 or more elements   [x] A clinical presentation with:  [x] stable and/or uncomplicated characteristics   [] evolving clinical presentation with changing characteristics  [] unstable and unpredictable characteristics;   [x] Clinical decision making of [] low, [] moderate, [] high complexity using standardized patient assessment instrument and/or measurable assessment of functional outcome. [x] EVAL (LOW) 05020 (typically 20 minutes face-to-face)  [] EVAL (MOD) 62739 (typically 30 minutes face-to-face)  [] EVAL (HIGH) 59013 (typically 45 minutes face-to-face)  [] RE-EVAL     PLAN:   Frequency/Duration:  1-2 days per week for 12 Weeks:  Interventions:  [x]  Therapeutic exercise including: strength training, ROM, for Lower extremity and core   [x]  NMR activation and proprioception for LE, Glutes and Core   [x]  Manual therapy as indicated for LE, Hip and spine to include: Dry Needling/IASTM, STM, PROM, Gr I-IV mobilizations, manipulation. [x] Modalities as needed that may include: thermal agents, E-stim, Biofeedback, US, iontophoresis as indicated  [x] Patient education on joint protection, postural re-education, activity modification, progression of HEP.     HEP instruction: [id number](see scanned forms)    GOALS:  Patient stated goal: Normal ROm and strength, get back to normal    Therapist goals for Patient:   Short Term Goals: To be achieved in: 2 weeks  1. Independent in HEP and progression per patient tolerance, in order to prevent re-injury. [] Progressing: [] Met: [] Not Met: [] Adjusted     2. Patient will have a decrease in pain to facilitate improvement in movement, function, and ADLs as indicated by Functional Deficits. [] Progressing: [] Met: [] Not Met: [] Adjusted     Long Term Goals: To be achieved in: 12 weeks  1. Disability index score of 50% or less for the LEFS to assist with reaching prior level of function. [] Progressing: [] Met: [] Not Met: [] Adjusted     2. Patient will demonstrate increased AROM to 0-125 to allow for proper joint functioning as indicated by patients Functional Deficits. [] Progressing: [] Met: [] Not Met: [] Adjusted     3. Patient will demonstrate an increase in Strength to knee flex/ext to 5/5 allow for proper functional mobility as indicated by patients Functional Deficits. [] Progressing: [] Met: [] Not Met: [] Adjusted     4. Patient will return to functional activities without increased symptoms or restriction. [] Progressing: [] Met: [] Not Met: [] Adjusted     5.  Pt will be able to ambulate without gait deviation. (patient specific functional goal)    [] Progressing: [] Met: [] Not Met: [] Adjusted      Electronically signed by:  Jesica Manzano, PT

## 2021-07-08 NOTE — FLOWSHEET NOTE
[address] [place] [address] and Sports [address], [address] [place] [address] [address]  Phone: [phone]   Fax:     [phone]      Physical Therapy Treatment Note/ Progress Report:           Date:  2021    Patient Name:  Jaleel Noyola    :  1985  MRN: [id number]  Restrictions/Precautions:    Medical/Treatment Diagnosis Information:  · Diagnosis: Z98.890 (ICD-10-CM) - History of repair of anterior cruciate ligament of left knee  · Treatment Diagnosis: Left knee pain, decreased ROM and strength  Insurance/Certification information:  PT Insurance Information: Self Pay  Physician Information:  Referring Practitioner: Jamari Kendall MD  Has the plan of care been signed (Y/N):        []  Yes  []  No     Date of Patient follow up with Physician:     Assessment Summary: Juan Cody is a 29 y.o. female reporting to OP PT s/p left ACL reconstruction with patellar autograft on 21. Pt is noted to have quite good extension ROM and flexion ROM is reduced. She has good distal strength , strength testing at knee deferred this date. There are no s/s of DVT or infection. She is currently WBAT. Will progress per protocol and phase of healing.        Is this a Progress Report:     []  Yes  [x]  No        If Yes:  Date Range for reporting period:  Beginning   21  Ending 21    Progress report will be due (10 Rx or 30 days whichever is less): 87       Recertification will be due (POC Duration  / 90 days whichever is less): 21          Visit # Insurance Allowable Auth Required   In Person 1 Self pay []  Yes     []  No    Tele Health   []  Yes     []  No    Total 1             Functional Scale: LEFS 76%    Date assessed:  21      Latex Allergy:  [x]NO      []YES  Preferred Language for Healthcare:   [x]English       []other:      Pain level:  6/10     SUBJECTIVE:  see eval    OBJECTIVE: see eval      RESTRICTIONS/PRECAUTIONS: s/p ACL recontruction w/ patellar autograft, WBAT, no SAQ, limit deep squats first 8 weeks    Exercises/Interventions: HEP code:  [id number]  Therapeutic Ex (09083) HEP 7/8/21     Warm-up       Rec bike              TABLE       Heel slides x x20     Strap gastroc stretch x 30\"x3     HS stretch x 30\"     Bridge x 10x3     QS x 10x3     SLR x x10     SL hip abduction x 10x2                   SEATED       Sit<>stand                                          STANDING       Heel raises x reviewed     Toe raises x reviewed     Knee flexion/butt kick  reviewed                                                                                           Manual (83903): None    Therapeutic Exercise and NMR EXR  [x] (43244) Provided verbal/tactile cueing for activities related to strengthening, flexibility, endurance, ROM for improvements in LE, proximal hip, and core control with self care, mobility, lifting, ambulation. [x] (37760) Provided verbal/tactile cueing for activities related to improving balance, coordination, kinesthetic sense, posture, motor skill, proprioception to assist with LE, proximal hip, and core control in self-care, mobility, lifting, ambulation and eccentric single leg control.      NMR and Therapeutic Activities:    [x] (59014 or 32764) Provided verbal/tactile cueing for activities related to improving balance, coordination, kinesthetic sense, posture, motor skill, proprioception and motor activation to allow for proper function of core, proximal hip and LE with self-care and ADLs and functional mobility.   [] (57440) Gait Re-education- Provided training and instruction to the patient for proper LE, core and proximal hip recruitment and positioning and eccentric body weight control with ambulation re-education including up and down stairs     Home Exercise Program:    [x] (40102) Reviewed/Progressed HEP activities related to strengthening, flexibility, endurance, ROM of core, proximal hip and LE for functional self-care, mobility, lifting and ambulation/stair navigation   [] (06220) Reviewed/Progressed HEP activities related to improving balance, coordination, kinesthetic sense, posture, motor skill, proprioception of core, proximal hip and LE for self-care, mobility, lifting, and ambulation/stair navigation      Manual Treatments:  PROM / STM / Oscillations-Mobs:  G-I, II, III, IV (PA's, Inf., Post.)  [x] (42363) Provided manual therapy to mobilize LE, proximal hip and/or LS spine soft tissue/joints for the purpose of modulating pain, promoting relaxation, increasing ROM, reducing/eliminating soft tissue swelling/inflammation/restriction, improving soft tissue extensibility and allowing for proper ROM for normal function with self-care, mobility, lifting and ambulation. Modalities:     [] GAME READY (VASO)- for significant edema, swelling, pain control. Charges:  Timed Code Treatment Minutes: 25   Total Treatment Minutes:  40   BWC:  TE TIME:  NMR TIME:  MANUAL TIME:  UNTIMED MINUTES:  Medicare Total:   15  10    15          [x] EVAL (LOW) 96496 (typically 20 minutes face-to-face)  [] EVAL (MOD) 91212 (typically 30 minutes face-to-face)  [] EVAL (HIGH) 90795 (typically 45 minutes face-to-face)  [] RE-EVAL     [x] TN(24085) x     [] IONTO  [x] NMR (49788) x     [] VASO  [] Manual (18238) x     [] Other:  [] TA x      [] Mech Traction (63579)  [] ES(attended) (77039)      [] ES (un) (68104):    GOALS:     Patient stated goal: Normal ROm and strength, get back to normal     Therapist goals for Patient:   Short Term Goals: To be achieved in: 2 weeks  1. Independent in HEP and progression per patient tolerance, in order to prevent re-injury. []? Progressing: []? Met: []? Not Met: []? Adjusted      2. Patient will have a decrease in pain to facilitate improvement in movement, function, and ADLs as indicated by Functional Deficits. []? Progressing: []? Met: []? Not Met: []? Adjusted      Long Term Goals: To be achieved in: 12 weeks  1. Disability index score of 50% or less for the LEFS to assist with reaching prior level of function. []? Progressing: []? Met: []? Not Met: []? Adjusted      2. Patient will demonstrate increased AROM to 0-125 to allow for proper joint functioning as indicated by patients Functional Deficits. []? Progressing: []? Met: []? Not Met: []? Adjusted      3. Patient will demonstrate an increase in Strength to knee flex/ext to 5/5 allow for proper functional mobility as indicated by patients Functional Deficits. []? Progressing: []? Met: []? Not Met: []? Adjusted      4. Patient will return to functional activities without increased symptoms or restriction. []? Progressing: []? Met: []? Not Met: []? Adjusted      5. Pt will be able to ambulate without gait deviation. (patient specific functional goal)    []? Progressing: []? Met: []? Not Met: []? Adjusted        ASSESSMENT:  See eval    Patient received education on their current pathology and how their condition effects them with their functional activities. Patient understood discussion and questions were answered. Patient understands their activity limitations and understands rational for treatment progression. Pt educated on plan of care and HEP, if worsening symptoms to d/c that exercise.            Return to Play: (if applicable)   []  Stage 1: Intro to Strength   []  Stage 2: Return to Run and Strength   []  Stage 3: Return to Jump and Strength   []  Stage 4: Dynamic Strength and Agility   []  Stage 5: Sport Specific Training     []  Ready to Return to Play, Meets All Above Stages   []  Not Ready for Return to Sports   Comments:                               PLAN: See eval  [x] Continue per plan of care [] Alter current plan (see comments above)  [] Plan of care initiated [] Hold pending MD visit [] Discharge      Electronically signed by:  Mehnaz Gurrola PT    Note: If patient does not return for scheduled/ recommended follow up visits, this note will serve as a discharge from care along with most recent update on progress.

## 2021-07-12 ENCOUNTER — HOSPITAL ENCOUNTER (OUTPATIENT)
Dept: PHYSICAL THERAPY | Age: 36
Setting detail: THERAPIES SERIES
Discharge: HOME | End: 2021-07-12
Payer: MEDICAID

## 2021-07-12 NOTE — FLOWSHEET NOTE
[address] [facility], [address] [place] [address], [address] [place] [address] [address]  Phone: [phone]   Fax:     [phone]    Physical Therapy  Cancellation/No-show Note  Patient Name:  Eamon Hernandez  :  1985   Date:  2021    Cancelled visits to date: 0  No-shows to date: 1    For today's appointment patient:  []  Cancelled  []  Rescheduled appointment  [x]  No-show     Reason given by patient:  []  Patient ill  []  Conflicting appointment  []  No transportation    []  Conflict with work  []  No reason given  []  Other:     Comments:      Phone call information:   []  Phone call made today to patient at _ time at number provided:      []  Patient answered, conversation as follows:    []  Patient did not answer, message left as follows:  []  Phone call not made today  []  Phone call not needed - pt contacted us to cancel and provided reason for cancellation.      Electronically signed by:  Mehnaz Gurrola, PT, PT

## 2021-07-15 ENCOUNTER — HOSPITAL ENCOUNTER (OUTPATIENT)
Dept: PHYSICAL THERAPY | Age: 36
Setting detail: THERAPIES SERIES
Discharge: HOME | End: 2021-07-15
Payer: MEDICAID

## 2021-07-15 PROCEDURE — 97112 NEUROMUSCULAR REEDUCATION: CPT

## 2021-07-15 PROCEDURE — 97110 THERAPEUTIC EXERCISES: CPT

## 2021-07-15 PROCEDURE — 97016 VASOPNEUMATIC DEVICE THERAPY: CPT

## 2021-07-15 NOTE — FLOWSHEET NOTE
[address] [place] [address] and [facility], [address] [place] [address] [address]  Phone: [phone]   Fax:     [phone]      Physical Therapy Treatment Note/ Progress Report:           Date:  7/15/2021    Patient Name:  Jimmie Brunner    :  1985  MRN: [id number]  Restrictions/Precautions:    Medical/Treatment Diagnosis Information:  · Diagnosis: Z98.890 (ICD-10-CM) - History of repair of anterior cruciate ligament of left knee  · Treatment Diagnosis: Left knee pain, decreased ROM and strength  Insurance/Certification information:  PT Insurance Information: Self Pay  Physician Information:  Referring Practitioner: Katherin Holcomb MD  Has the plan of care been signed (Y/N):        []  Yes  []  No     Date of Patient follow up with Physician:     Assessment Summary: Carlos Loya is a 29 y.o. female reporting to OP PT s/p left ACL reconstruction with patellar autograft on 21. Pt is noted to have quite good extension ROM and flexion ROM is reduced. She has good distal strength , strength testing at knee deferred this date. There are no s/s of DVT or infection. She is currently WBAT. Will progress per protocol and phase of healing. Is this a Progress Report:     []  Yes  [x]  No        If Yes:  Date Range for reporting period:  Beginning   21  Ending 21    Progress report will be due (10 Rx or 30 days whichever is less): 88       Recertification will be due (POC Duration  / 90 days whichever is less): 21          Visit # Insurance Allowable Auth Required   In Person 1 Self pay []  Yes     []  No    Tele Health   []  Yes     []  No    Total 1             Functional Scale: LEFS 76%    Date assessed:  21      Latex Allergy:  [x]NO      []YES  Preferred Language for Healthcare:   [x]English       []other:      Pain level:  2/10     SUBJECTIVE:  Pt states knee is sore. Worried about her incision.      OBJECTIVE:   Incision remains closed and no drainage. There is mild erythema around incision. Mild effusion       RESTRICTIONS/PRECAUTIONS: s/p ACL recontruction w/ patellar autograft, WBAT, no SAQ, limit deep squats first 8 weeks    Exercises/Interventions: HEP code:  [id number]  Therapeutic Ex (58166) HEP 7/8/21 7/15/21    Warm-up       Rec bike   7'           TABLE       Heel slides x x20 x30    Strap gastroc stretch x 30\"x3     HS stretch x 30\"     Bridge x 10x3     QS x 10x3 x10    SLR x x10 10x2    SL hip abduction x 10x2                   SEATED       Sit<>stand       LAQ 90-45   x30                                STANDING       Heel raises x reviewed 10x2    Toe raises x reviewed 10x2    Wedge gastroc stretch       Knee flexion/butt kick  reviewed     MARGARITA TKE   10x2, 15#                                                                                   Manual (65478): None    Therapeutic Exercise and NMR EXR  [x] (28356) Provided verbal/tactile cueing for activities related to strengthening, flexibility, endurance, ROM for improvements in LE, proximal hip, and core control with self care, mobility, lifting, ambulation. [x] (63184) Provided verbal/tactile cueing for activities related to improving balance, coordination, kinesthetic sense, posture, motor skill, proprioception to assist with LE, proximal hip, and core control in self-care, mobility, lifting, ambulation and eccentric single leg control.      NMR and Therapeutic Activities:    [x] (78798 or 41679) Provided verbal/tactile cueing for activities related to improving balance, coordination, kinesthetic sense, posture, motor skill, proprioception and motor activation to allow for proper function of core, proximal hip and LE with self-care and ADLs and functional mobility.   [] (64240) Gait Re-education- Provided training and instruction to the patient for proper LE, core and proximal hip recruitment and positioning and eccentric body weight control with ambulation re-education including up and down stairs     Home Exercise Program:    [x] (92008) Reviewed/Progressed HEP activities related to strengthening, flexibility, endurance, ROM of core, proximal hip and LE for functional self-care, mobility, lifting and ambulation/stair navigation   [] (00550) Reviewed/Progressed HEP activities related to improving balance, coordination, kinesthetic sense, posture, motor skill, proprioception of core, proximal hip and LE for self-care, mobility, lifting, and ambulation/stair navigation      Manual Treatments:  PROM / STM / Oscillations-Mobs:  G-I, II, III, IV (PA's, Inf., Post.)  [x] (64084) Provided manual therapy to mobilize LE, proximal hip and/or LS spine soft tissue/joints for the purpose of modulating pain, promoting relaxation, increasing ROM, reducing/eliminating soft tissue swelling/inflammation/restriction, improving soft tissue extensibility and allowing for proper ROM for normal function with self-care, mobility, lifting and ambulation. Modalities:     [] GAME READY (VASO)- for significant edema, swelling, pain control. Charges:  Timed Code Treatment Minutes: 35   Total Treatment Minutes:  45   BWC:  TE TIME:  NMR TIME:  MANUAL TIME:  UNTIMED MINUTES:  Medicare Total:   25  10    10          [] EVAL (LOW) 21022 (typically 20 minutes face-to-face)  [] EVAL (MOD) 33569 (typically 30 minutes face-to-face)  [] EVAL (HIGH) 32362 (typically 45 minutes face-to-face)  [] RE-EVAL     [x] EH(13016) 1     [] IONTO  [x] NMR (11828) 1     [x] VASO  [] Manual (41812) x     [] Other:  [] TA x      [] Mech Traction (16934)  [] ES(attended) (03831)      [] ES (un) (88902):    GOALS:     Patient stated goal: Normal ROm and strength, get back to normal     Therapist goals for Patient:   Short Term Goals: To be achieved in: 2 weeks  1. Independent in HEP and progression per patient tolerance, in order to prevent re-injury. []? Progressing: []? Met: []? Not Met: []? Adjusted      2.  Patient will have a Return to Jump and Strength   []  Stage 4: Dynamic Strength and Agility   []  Stage 5: Sport Specific Training     []  Ready to Return to Play, Meets All Above Stages   []  Not Ready for Return to Sports   Comments:                               PLAN: See eval  [x] Continue per plan of care [] Alter current plan (see comments above)  [] Plan of care initiated [] Hold pending MD visit [] Discharge      Electronically signed by:  Michael Forrester PT    Note: If patient does not return for scheduled/ recommended follow up visits, this note will serve as a discharge from care along with most recent update on progress.

## 2021-07-22 ENCOUNTER — HOSPITAL ENCOUNTER (OUTPATIENT)
Dept: PHYSICAL THERAPY | Age: 36
Setting detail: THERAPIES SERIES
Discharge: HOME | End: 2021-07-22
Payer: MEDICAID

## 2021-07-22 PROCEDURE — 97112 NEUROMUSCULAR REEDUCATION: CPT

## 2021-07-22 PROCEDURE — 97110 THERAPEUTIC EXERCISES: CPT

## 2021-07-22 PROCEDURE — 97016 VASOPNEUMATIC DEVICE THERAPY: CPT

## 2021-07-22 NOTE — FLOWSHEET NOTE
[address] [place] [address] and Sports Rehabilitation[address], [address] [place] [address] [address]  Phone: [phone]   Fax:     [phone]      Physical Therapy Treatment Note/ Progress Report:           Date:  2021    Patient Name:  Jaleel Noyola    :  1985  MRN: [id number]  Restrictions/Precautions:    Medical/Treatment Diagnosis Information:  · Diagnosis: Z98.890 (ICD-10-CM) - History of repair of anterior cruciate ligament of left knee  · Treatment Diagnosis: Left knee pain, decreased ROM and strength  Insurance/Certification information:  PT Insurance Information: Self Pay  Physician Information:  Referring Practitioner: Jamari Kendall MD  Has the plan of care been signed (Y/N):        []  Yes  []  No     Date of Patient follow up with Physician:     Assessment Summary: Juan Cody is a 29 y.o. female reporting to OP PT s/p left ACL reconstruction with patellar autograft on 21. Pt is noted to have quite good extension ROM and flexion ROM is reduced. She has good distal strength , strength testing at knee deferred this date. There are no s/s of DVT or infection. She is currently WBAT. Will progress per protocol and phase of healing. Is this a Progress Report:     []  Yes  [x]  No        If Yes:  Date Range for reporting period:  Beginning   21  Ending 21    Progress report will be due (10 Rx or 30 days whichever is less): 37       Recertification will be due (POC Duration  / 90 days whichever is less): 21          Visit # Insurance Allowable Auth Required   In Person 3 Self pay []  Yes     []  No    Tele Health   []  Yes     []  No    Total 3             Functional Scale: LEFS 76%    Date assessed:  21      Latex Allergy:  [x]NO      []YES  Preferred Language for Healthcare:   [x]English       []other:      Pain level:  5/10     SUBJECTIVE:  Pt states knee is sore. Worried about the swelling in her leg after she popped her knee. OBJECTIVE:   Incision well approximated. No erythema. Mild effusion      RESTRICTIONS/PRECAUTIONS: s/p ACL recontruction w/ patellar autograft, WBAT, no SAQ, limit deep squats first 8 weeks    Exercises/Interventions: HEP code:  [id number]  Therapeutic Ex (66119) HEP 7/8/21 7/15/21 7/22/21   Warm-up       Rec bike   7' 7'          TABLE       Heel slides x x20 x30 x30   Strap gastroc stretch x 30\"x3     HS stretch x 30\"     Bridge x 10x3     QS x 10x3 x10    SLR x x10 10x2 10x3   SL hip abduction x 10x2                   SEATED       Sit<>stand       LAQ 90-45   x30 x30 1#                               STANDING       Heel raises x reviewed 10x2 10x3   Toe raises x reviewed 10x2 10x3   Wedge gastroc stretch    30\"x2   Knee flexion/butt kick  reviewed     MARGARITA TKE   10x2, 15# 15x2 GTB   Side step x   5, 10 ft    Leg press DL (limited depth)    10 #20                                                                    Manual (60225): None    Therapeutic Exercise and NMR EXR  [x] (22033) Provided verbal/tactile cueing for activities related to strengthening, flexibility, endurance, ROM for improvements in LE, proximal hip, and core control with self care, mobility, lifting, ambulation. [x] (72672) Provided verbal/tactile cueing for activities related to improving balance, coordination, kinesthetic sense, posture, motor skill, proprioception to assist with LE, proximal hip, and core control in self-care, mobility, lifting, ambulation and eccentric single leg control.      NMR and Therapeutic Activities:    [x] (63147 or 95031) Provided verbal/tactile cueing for activities related to improving balance, coordination, kinesthetic sense, posture, motor skill, proprioception and motor activation to allow for proper function of core, proximal hip and LE with self-care and ADLs and functional mobility.   [] (65175) Gait Re-education- Provided training and instruction to the patient for proper LE, core and proximal hip recruitment and positioning and eccentric body weight control with ambulation re-education including up and down stairs     Home Exercise Program:    [x] (07431) Reviewed/Progressed HEP activities related to strengthening, flexibility, endurance, ROM of core, proximal hip and LE for functional self-care, mobility, lifting and ambulation/stair navigation   [] (83359) Reviewed/Progressed HEP activities related to improving balance, coordination, kinesthetic sense, posture, motor skill, proprioception of core, proximal hip and LE for self-care, mobility, lifting, and ambulation/stair navigation      Manual Treatments:  PROM / STM / Oscillations-Mobs:  G-I, II, III, IV (PA's, Inf., Post.)  [x] (41616) Provided manual therapy to mobilize LE, proximal hip and/or LS spine soft tissue/joints for the purpose of modulating pain, promoting relaxation, increasing ROM, reducing/eliminating soft tissue swelling/inflammation/restriction, improving soft tissue extensibility and allowing for proper ROM for normal function with self-care, mobility, lifting and ambulation. Modalities:     [] GAME READY (VASO)- for significant edema, swelling, pain control. Charges:  Timed Code Treatment Minutes: 40   Total Treatment Minutes:  50   BWC:  TE TIME:  NMR TIME:  MANUAL TIME:  UNTIMED MINUTES:  Medicare Total:   30  10    10          [] EVAL (LOW) 95325 (typically 20 minutes face-to-face)  [] EVAL (MOD) 04637 (typically 30 minutes face-to-face)  [] EVAL (HIGH) 93060 (typically 45 minutes face-to-face)  [] RE-EVAL     [x] QE(25681) 2     [] IONTO  [x] NMR (07448) 1     [x] VASO  [] Manual (01399) x     [] Other:  [] TA x      [] Mech Traction (34948)  [] ES(attended) (27222)      [] ES (un) (25196):    GOALS:     Patient stated goal: Normal ROm and strength, get back to normal     Therapist goals for Patient:   Short Term Goals: To be achieved in: 2 weeks  1.  Independent in HEP and progression per patient tolerance, in order to prevent re-injury. []? Progressing: []? Met: []? Not Met: []? Adjusted      2. Patient will have a decrease in pain to facilitate improvement in movement, function, and ADLs as indicated by Functional Deficits. []? Progressing: []? Met: []? Not Met: []? Adjusted      Long Term Goals: To be achieved in: 12 weeks  1. Disability index score of 50% or less for the LEFS to assist with reaching prior level of function. []? Progressing: []? Met: []? Not Met: []? Adjusted      2. Patient will demonstrate increased AROM to 0-125 to allow for proper joint functioning as indicated by patients Functional Deficits. []? Progressing: []? Met: []? Not Met: []? Adjusted      3. Patient will demonstrate an increase in Strength to knee flex/ext to 5/5 allow for proper functional mobility as indicated by patients Functional Deficits. []? Progressing: []? Met: []? Not Met: []? Adjusted      4. Patient will return to functional activities without increased symptoms or restriction. []? Progressing: []? Met: []? Not Met: []? Adjusted      5. Pt will be able to ambulate without gait deviation. (patient specific functional goal)    []? Progressing: []? Met: []? Not Met: []? Adjusted        ASSESSMENT:  Pt glenn session well. ROM is improving. I did not appreciated any s/s of infection of incision today. The erythema and effusion are expected at this stage of the healing progress. Pt tolerated an increase in reps and weight fine. Pt was regressed to a TB with the TKE to help gain tolerance to it. As well the leg press was added at a minimal knee bend angle which the patient did not tolerate as well due to pain. Patient received education on their current pathology and how their condition effects them with their functional activities. Patient understood discussion and questions were answered. Patient understands their activity limitations and understands rational for treatment progression.   Pt educated on plan of care and HEP, if worsening symptoms to d/c that exercise. Return to Play: (if applicable)   []  Stage 1: Intro to Strength   []  Stage 2: Return to Run and Strength   []  Stage 3: Return to Jump and Strength   []  Stage 4: Dynamic Strength and Agility   []  Stage 5: Sport Specific Training     []  Ready to Return to Play, Meets All Above Stages   []  Not Ready for Return to Sports   Comments:                               PLAN: See eval  [x] Continue per plan of care [] Alter current plan (see comments above)  [] Plan of care initiated [] Hold pending MD visit [] Discharge      Electronically signed by:  Jelly Hall PT    Note: If patient does not return for scheduled/ recommended follow up visits, this note will serve as a discharge from care along with most recent update on progress.

## 2021-08-02 ENCOUNTER — HOSPITAL ENCOUNTER (OUTPATIENT)
Dept: PHYSICAL THERAPY | Age: 36
Setting detail: THERAPIES SERIES
Discharge: HOME | End: 2021-08-02
Payer: MEDICAID

## 2021-08-03 ENCOUNTER — OFFICE VISIT (OUTPATIENT)
Dept: ORTHOPEDIC SURGERY | Age: 36
End: 2021-08-03

## 2021-08-03 VITALS — BODY MASS INDEX: 29.23 KG/M2 | HEIGHT: 63 IN | WEIGHT: 165 LBS

## 2021-08-03 DIAGNOSIS — Z98.890 HISTORY OF REPAIR OF ANTERIOR CRUCIATE LIGAMENT OF LEFT KNEE: ICD-10-CM

## 2021-08-03 DIAGNOSIS — S83.512A COMPLETE TEAR OF ANTERIOR CRUCIATE LIGAMENT OF LEFT KNEE, INITIAL ENCOUNTER: Primary | ICD-10-CM

## 2021-08-03 PROCEDURE — 99024 POSTOP FOLLOW-UP VISIT: CPT | Performed by: ORTHOPAEDIC SURGERY

## 2021-08-04 NOTE — PROGRESS NOTES
8/3/2021     Interval History:  Status post left knee ACL reconstruction with BTB autograft on 6/22/2021    The patient returns for postop evaluation. Unfortunately she does report recently falling and twisting the left knee. Since then she has had increased amounts of pain and swelling. She has difficulty bearing weight. Medical History:  Patient's medications, allergies, past medical, surgical, social, and family histories were reviewed and updated as appropriate. Objective:  Ht 5' 3\" (1.6 m)   Wt 165 lb (74.8 kg)   BMI 29.23 kg/m²      Physical Exam:  Examination of the left knee   There is a + effusion, no gross deformity or skin changes  Range of motion reveals 10-80  Difficult to assess ligamentous stability given effusion and guarding, diffuse tenderness about the knee  5 out of 5 strength throughout distal muscle groups  Sensation is intact to light touch throughout all distributions  There is no calf swelling or tenderness  Palpable DP pulse, brisk cap refill, 2+ symmetric reflexes    Assessment:  Status post left knee ACL reconstruction with BTB autograft on 6/22/2021    Plan:  Unfortunately patient did reinjure herself. She has effusion on exam and has difficulty bearing weight. As result would recommend MRI to assess the status of the ACL graft hand to rule out any other associated pathology. Disclaimer: This note was dictated with voice recognition software. Though review and correction are routine, we apologize for any errors.

## 2021-08-06 ENCOUNTER — HOSPITAL ENCOUNTER (OUTPATIENT)
Dept: MRI IMAGING | Age: 36
Discharge: HOME | End: 2021-08-06
Payer: MEDICAID

## 2021-08-06 DIAGNOSIS — S83.512A COMPLETE TEAR OF ANTERIOR CRUCIATE LIGAMENT OF LEFT KNEE, INITIAL ENCOUNTER: ICD-10-CM

## 2021-08-06 DIAGNOSIS — Z98.890 HISTORY OF REPAIR OF ANTERIOR CRUCIATE LIGAMENT OF LEFT KNEE: ICD-10-CM

## 2021-08-06 PROCEDURE — 73721 MRI JNT OF LWR EXTRE W/O DYE: CPT

## 2021-08-17 ENCOUNTER — TELEPHONE (OUTPATIENT)
Dept: ORTHOPEDIC SURGERY | Age: 36
End: 2021-08-17

## 2021-08-17 NOTE — TELEPHONE ENCOUNTER
MRI does not reveal any concerning findings. The ACL graft is intact. She should get back into physical therapy and continue per protocol. Return to see me 2 weeks from today.

## 2021-08-17 NOTE — TELEPHONE ENCOUNTER
Please advise on patient scheduling physical therapy after MRI. MRI report in [facility].

## 2021-08-17 NOTE — TELEPHONE ENCOUNTER
Patient called stating that she was waiting for a call to set up an appointment once we got her MRI test results. I explained we just needed to make an appointment. With Dr Isaías Tabor being out of the office next week she is requesting a call back on whether or not she is able to go ahead and resume PT or what the next steps are. As of now she does not have a future appointment and did not want to schedule for 8/31.

## 2021-11-05 ENCOUNTER — HOSPITAL ENCOUNTER (EMERGENCY)
Age: 36
Discharge: HOME | End: 2021-11-05
Attending: EMERGENCY MEDICINE
Payer: MEDICAID

## 2021-11-05 ENCOUNTER — APPOINTMENT (OUTPATIENT)
Dept: CT IMAGING | Age: 36
End: 2021-11-05
Payer: MEDICAID

## 2021-11-05 VITALS
RESPIRATION RATE: 16 BRPM | HEART RATE: 90 BPM | OXYGEN SATURATION: 100 % | DIASTOLIC BLOOD PRESSURE: 62 MMHG | TEMPERATURE: 99 F | BODY MASS INDEX: 33.13 KG/M2 | HEIGHT: 62 IN | WEIGHT: 180 LBS | SYSTOLIC BLOOD PRESSURE: 106 MMHG

## 2021-11-05 DIAGNOSIS — N20.0 NEPHROLITHIASIS: ICD-10-CM

## 2021-11-05 DIAGNOSIS — R19.7 NAUSEA VOMITING AND DIARRHEA: Primary | ICD-10-CM

## 2021-11-05 DIAGNOSIS — R11.2 NAUSEA VOMITING AND DIARRHEA: Primary | ICD-10-CM

## 2021-11-05 DIAGNOSIS — R10.9 ABDOMINAL PAIN, UNSPECIFIED ABDOMINAL LOCATION: ICD-10-CM

## 2021-11-05 DIAGNOSIS — N70.11 HYDROSALPINX: ICD-10-CM

## 2021-11-05 LAB
A/G RATIO: 1.5 (ref 1.1–2.2)
ALBUMIN SERPL-MCNC: 4.5 G/DL (ref 3.4–5)
ALP BLD-CCNC: 69 U/L (ref 40–129)
ALT SERPL-CCNC: 12 U/L (ref 10–40)
AMYLASE: 66 U/L (ref 25–115)
ANION GAP SERPL CALCULATED.3IONS-SCNC: 10 MMOL/L (ref 3–16)
AST SERPL-CCNC: 12 U/L (ref 15–37)
BACTERIA: ABNORMAL /HPF
BASOPHILS ABSOLUTE: 0 K/UL (ref 0–0.2)
BASOPHILS RELATIVE PERCENT: 0.8 %
BILIRUB SERPL-MCNC: <0.2 MG/DL (ref 0–1)
BILIRUBIN URINE: NEGATIVE
BLOOD, URINE: ABNORMAL
BUN BLDV-MCNC: 10 MG/DL (ref 7–20)
CALCIUM SERPL-MCNC: 9.3 MG/DL (ref 8.3–10.6)
CHLORIDE BLD-SCNC: 107 MMOL/L (ref 99–110)
CLARITY: CLEAR
CO2: 23 MMOL/L (ref 21–32)
COLOR: YELLOW
CREAT SERPL-MCNC: 0.8 MG/DL (ref 0.6–1.1)
EOSINOPHILS ABSOLUTE: 0.1 K/UL (ref 0–0.6)
EOSINOPHILS RELATIVE PERCENT: 1.8 %
EPITHELIAL CELLS, UA: ABNORMAL /HPF (ref 0–5)
GFR AFRICAN AMERICAN: >60
GFR NON-AFRICAN AMERICAN: >60
GLUCOSE BLD-MCNC: 103 MG/DL (ref 70–99)
GLUCOSE URINE: NEGATIVE MG/DL
HCG(URINE) PREGNANCY TEST: NEGATIVE
HCT VFR BLD CALC: 41.1 % (ref 36–48)
HEMOGLOBIN: 13.4 G/DL (ref 12–16)
KETONES, URINE: NEGATIVE MG/DL
LEUKOCYTE ESTERASE, URINE: NEGATIVE
LIPASE: 33 U/L (ref 13–60)
LYMPHOCYTES ABSOLUTE: 2.3 K/UL (ref 1–5.1)
LYMPHOCYTES RELATIVE PERCENT: 42.4 %
MCH RBC QN AUTO: 27.5 PG (ref 26–34)
MCHC RBC AUTO-ENTMCNC: 32.6 G/DL (ref 31–36)
MCV RBC AUTO: 84.6 FL (ref 80–100)
MICROSCOPIC EXAMINATION: YES
MONOCYTES ABSOLUTE: 0.3 K/UL (ref 0–1.3)
MONOCYTES RELATIVE PERCENT: 5.8 %
NEUTROPHILS ABSOLUTE: 2.7 K/UL (ref 1.7–7.7)
NEUTROPHILS RELATIVE PERCENT: 49.2 %
NITRITE, URINE: NEGATIVE
PDW BLD-RTO: 14.5 % (ref 12.4–15.4)
PH UA: 6 (ref 5–8)
PLATELET # BLD: 281 K/UL (ref 135–450)
PMV BLD AUTO: 7.7 FL (ref 5–10.5)
POTASSIUM SERPL-SCNC: 3.9 MMOL/L (ref 3.5–5.1)
PROTEIN UA: NEGATIVE MG/DL
RBC # BLD: 4.86 M/UL (ref 4–5.2)
RBC UA: ABNORMAL /HPF (ref 0–4)
SODIUM BLD-SCNC: 140 MMOL/L (ref 136–145)
SPECIFIC GRAVITY UA: 1.01 (ref 1–1.03)
TOTAL PROTEIN: 7.5 G/DL (ref 6.4–8.2)
URINE TYPE: ABNORMAL
UROBILINOGEN, URINE: 0.2 E.U./DL
WBC # BLD: 5.5 K/UL (ref 4–11)
WBC UA: ABNORMAL /HPF (ref 0–5)

## 2021-11-05 PROCEDURE — 2580000003 HC RX 258: Performed by: EMERGENCY MEDICINE

## 2021-11-05 PROCEDURE — U0005 INFEC AGEN DETEC AMPLI PROBE: HCPCS

## 2021-11-05 PROCEDURE — 82150 ASSAY OF AMYLASE: CPT

## 2021-11-05 PROCEDURE — 6370000000 HC RX 637 (ALT 250 FOR IP): Performed by: EMERGENCY MEDICINE

## 2021-11-05 PROCEDURE — 83690 ASSAY OF LIPASE: CPT

## 2021-11-05 PROCEDURE — 74177 CT ABD & PELVIS W/CONTRAST: CPT

## 2021-11-05 PROCEDURE — U0003 INFECTIOUS AGENT DETECTION BY NUCLEIC ACID (DNA OR RNA); SEVERE ACUTE RESPIRATORY SYNDROME CORONAVIRUS 2 (SARS-COV-2) (CORONAVIRUS DISEASE [COVID-19]), AMPLIFIED PROBE TECHNIQUE, MAKING USE OF HIGH THROUGHPUT TECHNOLOGIES AS DESCRIBED BY CMS-2020-01-R: HCPCS

## 2021-11-05 PROCEDURE — 80053 COMPREHEN METABOLIC PANEL: CPT

## 2021-11-05 PROCEDURE — 6360000002 HC RX W HCPCS: Performed by: EMERGENCY MEDICINE

## 2021-11-05 PROCEDURE — 36415 COLL VENOUS BLD VENIPUNCTURE: CPT

## 2021-11-05 PROCEDURE — 96375 TX/PRO/DX INJ NEW DRUG ADDON: CPT

## 2021-11-05 PROCEDURE — 81001 URINALYSIS AUTO W/SCOPE: CPT

## 2021-11-05 PROCEDURE — 84703 CHORIONIC GONADOTROPIN ASSAY: CPT

## 2021-11-05 PROCEDURE — 6360000004 HC RX CONTRAST MEDICATION: Performed by: EMERGENCY MEDICINE

## 2021-11-05 PROCEDURE — 99284 EMERGENCY DEPT VISIT MOD MDM: CPT

## 2021-11-05 PROCEDURE — 96374 THER/PROPH/DIAG INJ IV PUSH: CPT

## 2021-11-05 PROCEDURE — 85025 COMPLETE CBC W/AUTO DIFF WBC: CPT

## 2021-11-05 RX ORDER — DICYCLOMINE HYDROCHLORIDE 10 MG/1
10 CAPSULE ORAL 4 TIMES DAILY PRN
Qty: 20 CAPSULE | Refills: 3 | Status: SHIPPED | OUTPATIENT
Start: 2021-11-05 | End: 2022-02-16

## 2021-11-05 RX ORDER — DICYCLOMINE HYDROCHLORIDE 10 MG/1
10 CAPSULE ORAL ONCE
Status: COMPLETED | OUTPATIENT
Start: 2021-11-05 | End: 2021-11-05

## 2021-11-05 RX ORDER — PROMETHAZINE HYDROCHLORIDE 25 MG/1
25 TABLET ORAL 4 TIMES DAILY PRN
Qty: 20 TABLET | Refills: 0 | Status: SHIPPED | OUTPATIENT
Start: 2021-11-05 | End: 2021-11-12

## 2021-11-05 RX ORDER — ONDANSETRON 4 MG/1
4 TABLET, ORALLY DISINTEGRATING ORAL EVERY 8 HOURS PRN
Qty: 20 TABLET | Refills: 0 | Status: SHIPPED | OUTPATIENT
Start: 2021-11-05 | End: 2022-02-16

## 2021-11-05 RX ORDER — 0.9 % SODIUM CHLORIDE 0.9 %
1000 INTRAVENOUS SOLUTION INTRAVENOUS ONCE
Status: COMPLETED | OUTPATIENT
Start: 2021-11-05 | End: 2021-11-05

## 2021-11-05 RX ORDER — PROMETHAZINE HYDROCHLORIDE 25 MG/1
25 TABLET ORAL ONCE
Status: COMPLETED | OUTPATIENT
Start: 2021-11-05 | End: 2021-11-05

## 2021-11-05 RX ORDER — ONDANSETRON 2 MG/ML
4 INJECTION INTRAMUSCULAR; INTRAVENOUS
Status: DISCONTINUED | OUTPATIENT
Start: 2021-11-05 | End: 2021-11-05 | Stop reason: HOSPADM

## 2021-11-05 RX ORDER — KETOROLAC TROMETHAMINE 30 MG/ML
30 INJECTION, SOLUTION INTRAMUSCULAR; INTRAVENOUS ONCE
Status: COMPLETED | OUTPATIENT
Start: 2021-11-05 | End: 2021-11-05

## 2021-11-05 RX ORDER — LOPERAMIDE HYDROCHLORIDE 2 MG/1
2 CAPSULE ORAL 4 TIMES DAILY PRN
Qty: 20 CAPSULE | Refills: 0 | Status: SHIPPED | OUTPATIENT
Start: 2021-11-05 | End: 2022-02-16

## 2021-11-05 ASSESSMENT — PAIN SCALES - GENERAL
PAINLEVEL_OUTOF10: 7
PAINLEVEL_OUTOF10: 8
PAINLEVEL_OUTOF10: 8

## 2021-11-05 ASSESSMENT — PAIN DESCRIPTION - LOCATION: LOCATION: ABDOMEN

## 2021-11-05 ASSESSMENT — PAIN DESCRIPTION - PAIN TYPE
TYPE: ACUTE PAIN
TYPE: ACUTE PAIN

## 2021-11-05 NOTE — ED NOTES
Pt discharge instructions, follow up and rx x 4 reviewed with pt. Pt verbalized understanding. No further needs. Pt discharged at this time.         Mariana Sandoval RN  11/05/21 1671

## 2021-11-05 NOTE — ED PROVIDER NOTES
[facility] EMERGENCY DEPARTMENT      CHIEF COMPLAINT  Abdominal Pain (Pt having severe abd pain since yesterday. States she has a hx of diverticulitis and thought that it was a flair up, but states that she is having dark liquid  tarry stools. )       HISTORY OF PRESENT ILLNESS  Shan Ferrari is a 29 y.o. female  who presents to the ED complaining of abd pain x3 days. Burning/pins and needles feeling initially. + fever, n/v/d. Pain is central to lower abd radiation. pepto and APAP without improvement. No gross blood in stool. No known sick contacts. No suspect food ingestion. No recent abx. Has hx diverticulosis but no prior diverticulitis flare. No cough/dyspnea. No urinary sxs.  + body aches. No other complaints, modifying factors or associated symptoms. I have reviewed the following from the nursing documentation.     Past Medical History:   Diagnosis Date    Allergic rhinitis     Ankle fracture, right     Anxiety     Anxiety     Cardiac arrhythmia due to congenital heart disease     COPD (chronic obstructive pulmonary disease) (HCC)     Depression     Diverticulitis     Dizziness     Endometriosis     GERD (gastroesophageal reflux disease)     Kidney stone     OCD (obsessive compulsive disorder)     Ovarian cyst     Recurrent upper respiratory infection (URI)     Sinus arrhythmia      Past Surgical History:   Procedure Laterality Date     SECTION      FRACTURE SURGERY      right ankle    KNEE SURGERY Left 2021    LEFT KNEE VIDEO ARTHROSCOPY, ANTERIOR CRUCIATE LIGAMENT RECONSTRUCTION WITH BONE TO BONE AUTOGRAFT performed by Stiven Cook MD at [address]  10/16/2018    excision lower abdominal mass    [place] OFFICE/OUTPT VISIT,PROCEDURE ONLY N/A 10/16/2018    EXCISION ABDOMINAL WALL MASS performed by Chacha Velázquez MD at [facility] OR     Family History   Problem Relation Age of Onset    Arthritis Mother     High Blood Pressure Mother     Heart Disease Father     Heart Disease Maternal Grandmother     Breast Cancer Maternal Grandmother 48    Breast Cancer Maternal Aunt     Breast Cancer Paternal Aunt 36    Breast Cancer Paternal Aunt 40     Social History     Socioeconomic History    Marital status:      Spouse name: Not on file    Number of children: Not on file    Years of education: Not on file    Highest education level: Not on file   Occupational History    Not on file   Tobacco Use    Smoking status: Former Smoker     Packs/day: 0.25     Years: 9.00     Pack years: 2.25     Types: Cigarettes     Quit date: 2017     Years since quittin.8    Smokeless tobacco: Never Used   Vaping Use    Vaping Use: Never used   Substance and Sexual Activity    Alcohol use: Yes     Comment: occasional     Drug use: No    Sexual activity: Yes     Partners: Male   Other Topics Concern    Not on file   Social History Narrative    Not on file     Social Determinants of Health     Financial Resource Strain:     Difficulty of Paying Living Expenses:    Food Insecurity:     Worried About Running Out of Food in the Last Year:     Ran Out of Food in the Last Year:    Transportation Needs:     Lack of Transportation (Medical):      Lack of Transportation (Non-Medical):    Physical Activity:     Days of Exercise per Week:     Minutes of Exercise per Session:    Stress:     Feeling of Stress :    Social Connections:     Frequency of Communication with Friends and Family:     Frequency of Social Gatherings with Friends and Family:     Attends Spiritism Services:     Active Member of Clubs or Organizations:     Attends Club or Organization Meetings:     Marital Status:    Intimate Partner Violence:     Fear of Current or Ex-Partner:     Emotionally Abused:     Physically Abused:     Sexually Abused:      Current Facility-Administered Medications   Medication Dose Route Frequency Provider Last Rate Last Admin    ondansetron (ZOFRAN) injection 4 mg  4 mg IntraVENous Q1H PRN Kay Weiss MD   4 mg at 11/05/21 3243     Current Outpatient Medications   Medication Sig Dispense Refill    promethazine (PHENERGAN) 25 MG tablet Take 1 tablet by mouth 4 times daily as needed for Nausea 20 tablet 0    ondansetron (ZOFRAN ODT) 4 MG disintegrating tablet Take 1 tablet by mouth every 8 hours as needed for Nausea or Vomiting 20 tablet 0    loperamide (RA ANTI-DIARRHEAL) 2 MG capsule Take 1 capsule by mouth 4 times daily as needed for Diarrhea 20 capsule 0    dicyclomine (BENTYL) 10 MG capsule Take 1 capsule by mouth 4 times daily as needed (abdominal pain/cramping) 20 capsule 3    omeprazole (PRILOSEC) 20 MG delayed release capsule Take 20 mg by mouth daily      sertraline (ZOLOFT) 50 MG tablet Take 50 mg by mouth nightly      clonazePAM (KLONOPIN) 0.5 MG tablet Take 1 tablet by mouth every 6 hours as needed for Anxiety (Patient taking differently: Take 1 mg by mouth every 6 hours as needed for Anxiety ) 1 tablet 0    aspirin 325 MG EC tablet Take 1 tablet by mouth 2 times daily for 14 days 28 tablet 0    diclofenac (VOLTAREN) 50 MG EC tablet Take 1 tablet by mouth 2 times daily (with meals) 60 tablet 3     Allergies   Allergen Reactions    Bactrim Ds [Sulfamethoxazole-Trimethoprim] Anaphylaxis    Duloxetine Anaphylaxis    Hydromorphone Hcl      Other reaction(s): (moderate to severe)    Ethinyl Estradiol     Norgestimate     Other      States she is allergic to orthotrycycline    Sulfa Antibiotics     Trimethoprim      Other reaction(s): Trouble Breathing       REVIEW OF SYSTEMS  10 systems reviewed, pertinent positives per HPI otherwise noted to be negative. PHYSICAL EXAM  /62   Pulse 90   Temp 99 °F (37.2 °C) (Oral)   Resp 16   Ht 5' 2\" (1.575 m)   Wt 180 lb (81.6 kg)   SpO2 100%   BMI 32.92 kg/m²    GENERAL APPEARANCE: Awake and alert. Cooperative. No acute distress. HENT: Normocephalic. Atraumatic. Mucous membranes are moist.  No drooling or stridor. NECK: Supple. EYES: PERRL. EOM's grossly intact. HEART/CHEST: RRR. No murmurs. LUNGS: Respirations unlabored. CTAB. Good air exchange. Speaking comfortably in full sentences. ABDOMEN: diffuse abdominal tenderness. Soft. Non-distended. No masses. No organomegaly. No guarding or rebound. MUSCULOSKELETAL: No extremity edema. Compartments soft. No deformity. No tenderness in the extremities. All extremities neurovascularly intact. SKIN: Warm and dry. No acute rashes. NEUROLOGICAL: Alert and oriented. No gross focal deficits. PSYCHIATRIC: Normal mood and affect. LABS  I have reviewed all labs for this visit.    Results for orders placed or performed during the hospital encounter of 11/05/21   Urinalysis   Result Value Ref Range    Color, UA Yellow Straw/Yellow    Clarity, UA Clear Clear    Glucose, Ur Negative Negative mg/dL    Bilirubin Urine Negative Negative    Ketones, Urine Negative Negative mg/dL    Specific Gravity, UA 1.010 1.005 - 1.030    Blood, Urine SMALL (A) Negative    pH, UA 6.0 5.0 - 8.0    Protein, UA Negative Negative mg/dL    Urobilinogen, Urine 0.2 <2.0 E.U./dL    Nitrite, Urine Negative Negative    Leukocyte Esterase, Urine Negative Negative    Microscopic Examination YES     Urine Type NotGiven    Pregnancy, Urine   Result Value Ref Range    HCG(Urine) Pregnancy Test Negative Detects HCG level >20 MIU/mL   Microscopic Urinalysis   Result Value Ref Range    WBC, UA 0-2 0 - 5 /HPF    RBC, UA 0-2 0 - 4 /HPF    Epithelial Cells, UA 2-5 0 - 5 /HPF    Bacteria, UA 1+ (A) None Seen /HPF   Amylase   Result Value Ref Range    Amylase 66 25 - 115 U/L   CBC auto differential   Result Value Ref Range    WBC 5.5 4.0 - 11.0 K/uL    RBC 4.86 4.00 - 5.20 M/uL    Hemoglobin 13.4 12.0 - 16.0 g/dL    Hematocrit 41.1 36.0 - 48.0 %    MCV 84.6 80.0 - 100.0 fL    MCH 27.5 26.0 - 34.0 pg    MCHC 32.6 31.0 - 36.0 g/dL    RDW 14.5 12.4 - 15.4 %    Platelets 071 971 - 750 K/uL    MPV 7.7 5.0 - 10.5 fL    Neutrophils % 49.2 %    Lymphocytes % 42.4 %    Monocytes % 5.8 %    Eosinophils % 1.8 %    Basophils % 0.8 %    Neutrophils Absolute 2.7 1.7 - 7.7 K/uL    Lymphocytes Absolute 2.3 1.0 - 5.1 K/uL    Monocytes Absolute 0.3 0.0 - 1.3 K/uL    Eosinophils Absolute 0.1 0.0 - 0.6 K/uL    Basophils Absolute 0.0 0.0 - 0.2 K/uL   Comprehensive metabolic panel   Result Value Ref Range    Sodium 140 136 - 145 mmol/L    Potassium 3.9 3.5 - 5.1 mmol/L    Chloride 107 99 - 110 mmol/L    CO2 23 21 - 32 mmol/L    Anion Gap 10 3 - 16    Glucose 103 (H) 70 - 99 mg/dL    BUN 10 7 - 20 mg/dL    CREATININE 0.8 0.6 - 1.1 mg/dL    GFR Non-African American >60 >60    GFR African American >60 >60    Calcium 9.3 8.3 - 10.6 mg/dL    Total Protein 7.5 6.4 - 8.2 g/dL    Albumin 4.5 3.4 - 5.0 g/dL    Albumin/Globulin Ratio 1.5 1.1 - 2.2    Total Bilirubin <0.2 0.0 - 1.0 mg/dL    Alkaline Phosphatase 69 40 - 129 U/L    ALT 12 10 - 40 U/L    AST 12 (L) 15 - 37 U/L   Lipase   Result Value Ref Range    Lipase 33.0 13.0 - 60.0 U/L     RADIOLOGY  CT ABDOMEN PELVIS W IV CONTRAST Additional Contrast? None    Result Date: 11/5/2021  EXAMINATION: CT OF THE ABDOMEN AND PELVIS WITH CONTRAST 11/5/2021 1:36 pm TECHNIQUE: CT of the abdomen and pelvis was performed with the administration of intravenous contrast. Multiplanar reformatted images are provided for review. Dose modulation, iterative reconstruction, and/or weight based adjustment of the mA/kV was utilized to reduce the radiation dose to as low as reasonably achievable.  COMPARISON: 04/08/2021 HISTORY: ORDERING SYSTEM PROVIDED HISTORY: abd pain, n/v/d TECHNOLOGIST PROVIDED HISTORY: Reason for exam:->abd pain, n/v/d Additional Contrast?->None Decision Support Exception - unselect if not a suspected or confirmed emergency medical condition->Emergency Medical Condition (MA) Reason for Exam: Abdominal pain, nausea, vomiting Acuity: Acute Type of Exam: Initial FINDINGS: Lower Chest: Lung bases clear Organs: Small bilateral renal calculi. No ureterolithiasis or hydronephrosis. Remaining solid organs and gallbladder unremarkable except for 1 cm cyst or hemangioma in the posterior hepatic lobe GI/Bowel: No gastrointestinal abnormality demonstrated. In particular, no intestinal wall thickening. Normal appendix Pelvis: Uterus and ovaries within normal limits, with retroflexed uterus and recently ovulated follicle on the left ovary. Teardrop shaped cystic structures adjacent to both ovaries. No free pelvic fluid. Urinary bladder unremarkable Peritoneum/Retroperitoneum: No ascites or pneumoperitoneum. Normal caliber aorta Bones/Soft Tissues: No acute bony abnormality. Dorsal bulging disc at L5-S1. Central dorsal disc protrusion or extrusion at L4-L5     1. No acute gastrointestinal abnormality or other acute process demonstrated 2. Bilateral nephrolithiasis 3. Suspected bilateral hydrosalpinx. Recommend pelvic ultrasound 4. Intervertebral disc disease in the lower lumbar spine       ED COURSE/MDM  Patient seen and evaluated. Old records reviewed. Labs and imaging reviewed and results discussed with patient. Patient with a gastroenteritis type symptoms. Certainly Covid is a consideration. Given her history of diverticulosis I did obtain a CT scan of abdomen pelvis which showed no acute finding. She did have bilateral nephrolithiasis as well as bilateral hydrosalpinx which was discussed with her and need for outpatient pelvic ultrasound. Patient initially given Zofran, Toradol and fluids for symptom control followed by Bentyl and Phenergan. She will be discharged with antiemetics, antispasmodics and Imodium. Patient in agreement of plan. Reasons to return to the ER were discharged and all questions were answered at time of discharge.     I estimate there is LOW risk for ACUTE APPENDICITIS, BOWEL OBSTRUCTION, CHOLECYSTITIS, DIVERTICULITIS, INCARCERATED HERNIA, PANCREATITIS, or PERFORATED BOWEL or ULCER, thus I consider the discharge disposition reasonable. Also, there is no evidence or peritonitis, sepsis, or toxicity. Shan Ferrari and I have discussed the diagnosis and risks, and we agree with discharging home to follow-up with their primary doctor. We also discussed returning to the Emergency Department immediately if new or worsening symptoms occur. We have discussed the symptoms which are most concerning (e.g., bloody stool, fever, changing or worsening pain, vomiting) that necessitate immediate return. During the patient's ED course, the patient was given:  Medications   ondansetron (ZOFRAN) injection 4 mg (4 mg IntraVENous Given 11/5/21 1348)   0.9 % sodium chloride bolus (0 mLs IntraVENous Stopped 11/5/21 1505)   ketorolac (TORADOL) injection 30 mg (30 mg IntraVENous Given 11/5/21 1348)   iopamidol (ISOVUE-370) 76 % injection 75 mL (75 mLs IntraVENous Given 11/5/21 1343)   dicyclomine (BENTYL) capsule 10 mg (10 mg Oral Given 11/5/21 1502)   promethazine (PHENERGAN) tablet 25 mg (25 mg Oral Given 11/5/21 1502)        CLINICAL IMPRESSION  1. Nausea vomiting and diarrhea    2. Abdominal pain, unspecified abdominal location        Blood pressure 106/62, pulse 90, temperature 99 °F (37.2 °C), temperature source Oral, resp. rate 16, height 5' 2\" (1.575 m), weight 180 lb (81.6 kg), SpO2 100 %, not currently breastfeeding. DISPOSITION  Shan Ferrari was discharged to home in stable condition. Patient was given scripts for the following medications. I counseled patient how to take these medications.    Discharge Medication List as of 11/5/2021  2:57 PM      START taking these medications    Details   promethazine (PHENERGAN) 25 MG tablet Take 1 tablet by mouth 4 times daily as needed for Nausea, Disp-20 tablet, R-0Normal      ondansetron (ZOFRAN ODT) 4 MG disintegrating tablet Take 1 tablet by mouth every 8 hours as needed for Nausea or Vomiting, Disp-20 tablet, R-0Normal      loperamide (RA ANTI-DIARRHEAL) 2 MG capsule Take 1 capsule by mouth 4 times daily as needed for Diarrhea, Disp-20 capsule, R-0Normal      dicyclomine (BENTYL) 10 MG capsule Take 1 capsule by mouth 4 times daily as needed (abdominal pain/cramping), Disp-20 capsule, R-3Normal             Follow-up with:  Luc Burnett, APRN - CNP  [address]  [address]  [phone]    Schedule an appointment as soon as possible for a visit in 2 days  For recheck      DISCLAIMER: This chart was created using Dragon dictation software. Efforts were made by me to ensure accuracy, however some errors may be present due to limitations of this technology and occasionally words are not transcribed correctly.         Shravan Maher MD  11/05/21 0090

## 2021-11-06 LAB — SARS-COV-2: NOT DETECTED

## 2021-11-10 ENCOUNTER — TELEPHONE (OUTPATIENT)
Dept: ORTHOPEDIC SURGERY | Age: 36
End: 2021-11-10

## 2021-11-10 NOTE — TELEPHONE ENCOUNTER
LVM for patient regarding the [address] [place] [address] Orthopedic joint pain program. Patient can call [phone] for more information or to schedule an appointment with a joint pain specialist.

## 2022-02-02 ENCOUNTER — TELEPHONE (OUTPATIENT)
Dept: ORTHOPEDIC SURGERY | Age: 37
End: 2022-02-02

## 2022-02-02 NOTE — TELEPHONE ENCOUNTER
[facility] sent a request for Plan Of Care signature. Scanned document is in [address]

## 2022-02-09 ENCOUNTER — OFFICE VISIT (OUTPATIENT)
Dept: CARDIOLOGY CLINIC | Age: 37
End: 2022-02-09
Payer: MEDICAID

## 2022-02-09 VITALS
OXYGEN SATURATION: 98 % | BODY MASS INDEX: 33.13 KG/M2 | HEART RATE: 84 BPM | HEIGHT: 62 IN | SYSTOLIC BLOOD PRESSURE: 126 MMHG | WEIGHT: 180 LBS | DIASTOLIC BLOOD PRESSURE: 68 MMHG

## 2022-02-09 DIAGNOSIS — R07.9 CHEST PAIN, UNSPECIFIED TYPE: ICD-10-CM

## 2022-02-09 DIAGNOSIS — R55 NEAR SYNCOPE: Chronic | ICD-10-CM

## 2022-02-09 DIAGNOSIS — I49.9 IRREGULAR HEART RATE: Primary | ICD-10-CM

## 2022-02-09 PROCEDURE — 93000 ELECTROCARDIOGRAM COMPLETE: CPT | Performed by: INTERNAL MEDICINE

## 2022-02-09 PROCEDURE — 99204 OFFICE O/P NEW MOD 45 MIN: CPT | Performed by: INTERNAL MEDICINE

## 2022-02-09 NOTE — LETTER
Shan Ferrari  1985           [address]   Cardiac Consultation    Date: 2/9/22  Patient Name: Carl Perry  YOB: 1985    Primary Care Physician: SHERI Adler CNP    CHIEF COMPLAINT:   Chief Complaint   Patient presents with    New Patient    Palpitations    Chest Pain     left side and pain goes up to left arm and neck       HPI:  Carl Perry is a 40 y.o. female with a PMH significant for syncope and palpitations. She presented to the ER on 1/15/17 for chest pain. She underwent a stress test on 1/27/17. Her echocardiogram on 1/16/17 showed an EF of 55%. Her cardiac event monitor starting 1/24/17 showed symptoms associated with sinus rhythm. She reports having daily palpitations since December 2016. She states the palpations occur multiple times per day, lasting from for a few seconds up to one minute. She reports having a \"thump\", then a pause. The skipped beat is most concerning to her. When patient is upset her symptoms are worse. Palpitations increase with any exertion, including while doing regular daily activities and while exercising. Nothing makes symptoms better. She started taking Propranolol in January. This \"bottomed out\" her blood pressure. She stopped this after three days. She was then prescribed Verapamil, which also dropped her blood pressure. She took this medication for two days. She states she quit smoking on 1/10/17. Denies edema, nausea, vomiting or fatigue. 30 day event recorder from 1/24/17-2/23/17 showed average HR 84 bpm (). She transmitted 501 symptomatic episodes, all of which showed sinus rhythm. Some had single isolated PVC's. Today, 2/9/2022, she states she has episodes of sharp left sided chest pain that lasts seconds to a couple of minutes. This has been worsening for about 4 months. She states that pain radiates to her left shoulder, arm, and neck. She does not identify any specific triggers. She rests when this happens. She has a family history of heart disease from both  parents. She is taking her medications as prescribed. Patient denies current edema, sob, palpitations, dizziness or syncope. Past Medical History:   has a past medical history of Allergic rhinitis, Ankle fracture, right, Anxiety, Anxiety, Cardiac arrhythmia due to congenital heart disease, COPD (chronic obstructive pulmonary disease) ([address] Depression, Diverticulitis, Dizziness, Endometriosis, GERD (gastroesophageal reflux disease), Kidney stone, OCD (obsessive compulsive disorder), Ovarian cyst, Recurrent upper respiratory infection (URI), and Sinus arrhythmia. Surgical History:   has a past surgical history that includes  section; fracture surgery; other surgical history (10/16/2018); pr office/outpt visit,procedure only (N/A, 10/16/2018); and knee surgery (Left, 2021). Social History:   reports that she quit smoking about 5 years ago. Her smoking use included cigarettes. She has a 2.25 pack-year smoking history. She has never used smokeless tobacco. She reports current alcohol use. She reports that she does not use drugs. Family History:  family history includes Arthritis in her mother; Breast Cancer in her maternal aunt; Breast Cancer (age of onset: 36) in her paternal aunt; Breast Cancer (age of onset: 40) in her paternal aunt; Breast Cancer (age of onset: 48) in her maternal grandmother; Heart Disease in her father and maternal grandmother; High Blood Pressure in her mother.      Home Medications:  Outpatient Encounter Medications as of 2022   Medication Sig Dispense Refill    Esomeprazole Magnesium (NEXIUM PO) Take by mouth      sertraline (ZOLOFT) 50 MG tablet Take 100 mg by mouth 2 times daily       clonazePAM (KLONOPIN) 0.5 MG tablet Take 1 tablet by mouth every 6 hours as needed for Anxiety (Patient taking differently: Take 1 mg by mouth every 6 hours as needed for Anxiety ) 1 tablet 0  ondansetron (ZOFRAN ODT) 4 MG disintegrating tablet Take 1 tablet by mouth every 8 hours as needed for Nausea or Vomiting (Patient not taking: Reported on 2/9/2022) 20 tablet 0    loperamide (RA ANTI-DIARRHEAL) 2 MG capsule Take 1 capsule by mouth 4 times daily as needed for Diarrhea (Patient not taking: Reported on 2/9/2022) 20 capsule 0    dicyclomine (BENTYL) 10 MG capsule Take 1 capsule by mouth 4 times daily as needed (abdominal pain/cramping) (Patient not taking: Reported on 2/9/2022) 20 capsule 3    aspirin 325 MG EC tablet Take 1 tablet by mouth 2 times daily for 14 days 28 tablet 0    diclofenac (VOLTAREN) 50 MG EC tablet Take 1 tablet by mouth 2 times daily (with meals) (Patient not taking: Reported on 2/9/2022) 60 tablet 3    omeprazole (PRILOSEC) 20 MG delayed release capsule Take 20 mg by mouth daily (Patient not taking: Reported on 2/9/2022)       No facility-administered encounter medications on file as of 2/9/2022. Allergies:  Bactrim ds [sulfamethoxazole-trimethoprim], Duloxetine, Hydromorphone hcl, Ethinyl estradiol, Norgestimate, Other, Sulfa antibiotics, and Trimethoprim     Review of Systems   Constitutional: Negative. HENT: Negative. Eyes: Negative. Respiratory: Negative. Cardiovascular: Negative. Gastrointestinal: Negative. Genitourinary: Negative. Musculoskeletal: Negative. Skin: Negative. Neurological: Negative. Hematological: Negative. Psychiatric/Behavioral: Negative. /68   Pulse 84   Ht 5' 2\" (1.575 m)   Wt 180 lb (81.6 kg)   SpO2 98%   BMI 32.92 kg/m²       Objective:  Physical Exam   Constitutional: She is oriented to person, place, and time. She appears well-developed and well-nourished. HENT:   Head: Normocephalic and atraumatic. Eyes: Pupils are equal, round, and reactive to light. Neck: Normal range of motion. Cardiovascular: Normal rate, regular rhythm and normal heart sounds.  +Late systolic click Pulmonary/Chest: Effort normal and breath sounds normal.   Abdominal: Soft. No tenderness. Musculoskeletal: Normal range of motion. She exhibits no edema. Neurological: She is alert and oriented to person, place, and time. Skin: Skin is warm and dry. Psychiatric: She has a normal mood and affect. Assessment:  1. Atypical chest pain-though her chest pain is somewhat atypical in terms of character and behavior, she does have multiple risk factors including family history from both parents. Perfusion type stress test ordered to evaluate this possibility. 2. Mitral Valve prolapse syndrome-she has late systolic click on exam and multiple symptoms of mitral valve prolapse syndrome including migraine type headaches, atypical chest pain, palpitations, irritable bowel syndrome type symptoms, and fluctuating energy levels. She appears to tolerate beta-blockers and calcium channel blockers poorly so we have not initiated any pharmacologic therapy at this time. Plan:  1. Stress test for chest pain. 2. Continue current medications. 3. Follow up in 3 months. QUALITY MEASURES  1. Tobacco Cessation Counseling: NA  2. Retake of BP if >140/90:   NA  3. Documentation to PCP/referring for new patient:  Sent to PCP at close of office visit  4. CAD patient on anti-platelet: NA  5. CAD patient on STATIN therapy:  NA  6. Patient with CHF and aFib on anticoagulation:  NA      Marlin Farah M.D.     Diya Horton RN, am scribing for and in the presence of Dr. Marlin Farah. 02/09/22 11:08 AM   Ahsan Chu RN      I, Dr. Marlin Farah, personally performed the services described in this documentation as scribed by Ahsan Chu RN in my presence, and it is both accurate and complete.

## 2022-02-09 NOTE — PATIENT INSTRUCTIONS
1. You have mitral valve prolapse that could describe a lot of your symptoms. 2. Stress test for chest pain. We will call you with results. 3. Follow up in 3 months. Your provider has ordered testing for further evaluation. An order/prescription has been included in your paper work.  To schedule outpatient testing, contact Central Scheduling by calling [address] [place] [address] ([phone]).

## 2022-02-09 NOTE — PROGRESS NOTES
[address]   Cardiac Consultation    Date: 2/9/22  Patient Name: Brooks Bearden  YOB: 1985    Primary Care Physician: SHERI Jason CNP    CHIEF COMPLAINT:   Chief Complaint   Patient presents with    New Patient    Palpitations    Chest Pain     left side and pain goes up to left arm and neck       HPI:  Brooks Bearden is a 40 y.o. female with a PMH significant for syncope and palpitations. She presented to the ER on 1/15/17 for chest pain. She underwent a stress test on 1/27/17. Her echocardiogram on 1/16/17 showed an EF of 55%. Her cardiac event monitor starting 1/24/17 showed symptoms associated with sinus rhythm. She reports having daily palpitations since December 2016. She states the palpations occur multiple times per day, lasting from for a few seconds up to one minute. She reports having a \"thump\", then a pause. The skipped beat is most concerning to her. When patient is upset her symptoms are worse. Palpitations increase with any exertion, including while doing regular daily activities and while exercising. Nothing makes symptoms better. She started taking Propranolol in January. This \"bottomed out\" her blood pressure. She stopped this after three days. She was then prescribed Verapamil, which also dropped her blood pressure. She took this medication for two days. She states she quit smoking on 1/10/17. Denies edema, nausea, vomiting or fatigue. 30 day event recorder from 1/24/17-2/23/17 showed average HR 84 bpm (). She transmitted 501 symptomatic episodes, all of which showed sinus rhythm. Some had single isolated PVC's. Today, 2/9/2022, she states she has episodes of sharp left sided chest pain that lasts seconds to a couple of minutes. This has been worsening for about 4 months. She states that pain radiates to her left shoulder, arm, and neck. She does not identify any specific triggers. She rests when this happens.  She has disintegrating tablet Take 1 tablet by mouth every 8 hours as needed for Nausea or Vomiting (Patient not taking: Reported on 2/9/2022) 20 tablet 0    loperamide (RA ANTI-DIARRHEAL) 2 MG capsule Take 1 capsule by mouth 4 times daily as needed for Diarrhea (Patient not taking: Reported on 2/9/2022) 20 capsule 0    dicyclomine (BENTYL) 10 MG capsule Take 1 capsule by mouth 4 times daily as needed (abdominal pain/cramping) (Patient not taking: Reported on 2/9/2022) 20 capsule 3    aspirin 325 MG EC tablet Take 1 tablet by mouth 2 times daily for 14 days 28 tablet 0    diclofenac (VOLTAREN) 50 MG EC tablet Take 1 tablet by mouth 2 times daily (with meals) (Patient not taking: Reported on 2/9/2022) 60 tablet 3    omeprazole (PRILOSEC) 20 MG delayed release capsule Take 20 mg by mouth daily (Patient not taking: Reported on 2/9/2022)       No facility-administered encounter medications on file as of 2/9/2022. Allergies:  Bactrim ds [sulfamethoxazole-trimethoprim], Duloxetine, Hydromorphone hcl, Ethinyl estradiol, Norgestimate, Other, Sulfa antibiotics, and Trimethoprim     Review of Systems   Constitutional: Negative. HENT: Negative. Eyes: Negative. Respiratory: Negative. Cardiovascular: Negative. Gastrointestinal: Negative. Genitourinary: Negative. Musculoskeletal: Negative. Skin: Negative. Neurological: Negative. Hematological: Negative. Psychiatric/Behavioral: Negative. /68   Pulse 84   Ht 5' 2\" (1.575 m)   Wt 180 lb (81.6 kg)   SpO2 98%   BMI 32.92 kg/m²       Objective:  Physical Exam   Constitutional: She is oriented to person, place, and time. She appears well-developed and well-nourished. HENT:   Head: Normocephalic and atraumatic. Eyes: Pupils are equal, round, and reactive to light. Neck: Normal range of motion. Cardiovascular: Normal rate, regular rhythm and normal heart sounds.  +Late systolic click   Pulmonary/Chest: Effort normal and breath sounds normal.   Abdominal: Soft. No tenderness. Musculoskeletal: Normal range of motion. She exhibits no edema. Neurological: She is alert and oriented to person, place, and time. Skin: Skin is warm and dry. Psychiatric: She has a normal mood and affect. Assessment:  1. Atypical chest pain-though her chest pain is somewhat atypical in terms of character and behavior, she does have multiple risk factors including family history from both parents. Perfusion type stress test ordered to evaluate this possibility. 2. Mitral Valve prolapse syndrome-she has late systolic click on exam and multiple symptoms of mitral valve prolapse syndrome including migraine type headaches, atypical chest pain, palpitations, irritable bowel syndrome type symptoms, and fluctuating energy levels. She appears to tolerate beta-blockers and calcium channel blockers poorly so we have not initiated any pharmacologic therapy at this time. Plan:  1. Stress test for chest pain. 2. Continue current medications. 3. Follow up in 3 months. QUALITY MEASURES  1. Tobacco Cessation Counseling: NA  2. Retake of BP if >140/90:   NA  3. Documentation to PCP/referring for new patient:  Sent to PCP at close of office visit  4. CAD patient on anti-platelet: NA  5. CAD patient on STATIN therapy:  NA  6. Patient with CHF and aFib on anticoagulation:  NA      Floridalma Onofre M.D.     Blair Fuentes RN, am scribing for and in the presence of Dr. Floridalma Onofre. 02/09/22 11:08 AM   Lulu Akins RN      I, Dr. Floridalma Onofre, personally performed the services described in this documentation as scribed by Lulu Akins RN in my presence, and it is both accurate and complete.

## 2022-02-16 ENCOUNTER — APPOINTMENT (OUTPATIENT)
Dept: CT IMAGING | Age: 37
End: 2022-02-16
Payer: MEDICAID

## 2022-02-16 ENCOUNTER — HOSPITAL ENCOUNTER (EMERGENCY)
Age: 37
Discharge: HOME | End: 2022-02-16
Attending: EMERGENCY MEDICINE
Payer: MEDICAID

## 2022-02-16 VITALS
BODY MASS INDEX: 31.1 KG/M2 | TEMPERATURE: 97.8 F | OXYGEN SATURATION: 100 % | HEIGHT: 62 IN | HEART RATE: 97 BPM | DIASTOLIC BLOOD PRESSURE: 69 MMHG | SYSTOLIC BLOOD PRESSURE: 110 MMHG | RESPIRATION RATE: 18 BRPM | WEIGHT: 169 LBS

## 2022-02-16 DIAGNOSIS — R10.9 ABDOMINAL PAIN, UNSPECIFIED ABDOMINAL LOCATION: Primary | ICD-10-CM

## 2022-02-16 DIAGNOSIS — R11.0 NAUSEA: ICD-10-CM

## 2022-02-16 LAB
A/G RATIO: 1.5 (ref 1.1–2.2)
ALBUMIN SERPL-MCNC: 4.3 G/DL (ref 3.4–5)
ALP BLD-CCNC: 55 U/L (ref 40–129)
ALT SERPL-CCNC: 10 U/L (ref 10–40)
ANION GAP SERPL CALCULATED.3IONS-SCNC: 12 MMOL/L (ref 3–16)
AST SERPL-CCNC: 21 U/L (ref 15–37)
BACTERIA: ABNORMAL /HPF
BASOPHILS ABSOLUTE: 0 K/UL (ref 0–0.2)
BASOPHILS RELATIVE PERCENT: 1 %
BILIRUB SERPL-MCNC: <0.2 MG/DL (ref 0–1)
BILIRUBIN URINE: ABNORMAL
BLOOD, URINE: ABNORMAL
BUN BLDV-MCNC: 13 MG/DL (ref 7–20)
CALCIUM SERPL-MCNC: 9 MG/DL (ref 8.3–10.6)
CHLORIDE BLD-SCNC: 105 MMOL/L (ref 99–110)
CLARITY: CLEAR
CO2: 21 MMOL/L (ref 21–32)
COLOR: YELLOW
CREAT SERPL-MCNC: 0.7 MG/DL (ref 0.6–1.1)
EOSINOPHILS ABSOLUTE: 0.1 K/UL (ref 0–0.6)
EOSINOPHILS RELATIVE PERCENT: 2.1 %
EPITHELIAL CELLS, UA: ABNORMAL /HPF (ref 0–5)
GFR AFRICAN AMERICAN: >60
GFR NON-AFRICAN AMERICAN: >60
GLUCOSE BLD-MCNC: 95 MG/DL (ref 70–99)
GLUCOSE URINE: NEGATIVE MG/DL
HCG(URINE) PREGNANCY TEST: NEGATIVE
HCT VFR BLD CALC: 37 % (ref 36–48)
HEMOGLOBIN: 12.4 G/DL (ref 12–16)
KETONES, URINE: 40 MG/DL
LEUKOCYTE ESTERASE, URINE: NEGATIVE
LIPASE: 53 U/L (ref 13–60)
LYMPHOCYTES ABSOLUTE: 1.7 K/UL (ref 1–5.1)
LYMPHOCYTES RELATIVE PERCENT: 39.7 %
MCH RBC QN AUTO: 28.1 PG (ref 26–34)
MCHC RBC AUTO-ENTMCNC: 33.4 G/DL (ref 31–36)
MCV RBC AUTO: 84.1 FL (ref 80–100)
MICROSCOPIC EXAMINATION: YES
MONOCYTES ABSOLUTE: 0.3 K/UL (ref 0–1.3)
MONOCYTES RELATIVE PERCENT: 7.2 %
MUCUS: ABNORMAL /LPF
NEUTROPHILS ABSOLUTE: 2.2 K/UL (ref 1.7–7.7)
NEUTROPHILS RELATIVE PERCENT: 50 %
NITRITE, URINE: NEGATIVE
PDW BLD-RTO: 14.4 % (ref 12.4–15.4)
PH UA: 6 (ref 5–8)
PLATELET # BLD: 217 K/UL (ref 135–450)
PMV BLD AUTO: 8.5 FL (ref 5–10.5)
POTASSIUM REFLEX MAGNESIUM: 4.4 MMOL/L (ref 3.5–5.1)
PROTEIN UA: NEGATIVE MG/DL
RBC # BLD: 4.4 M/UL (ref 4–5.2)
RBC UA: ABNORMAL /HPF (ref 0–4)
SODIUM BLD-SCNC: 138 MMOL/L (ref 136–145)
SPECIFIC GRAVITY UA: 1.02 (ref 1–1.03)
TOTAL PROTEIN: 7.1 G/DL (ref 6.4–8.2)
URINE TYPE: ABNORMAL
UROBILINOGEN, URINE: 0.2 E.U./DL
WBC # BLD: 4.3 K/UL (ref 4–11)
WBC UA: ABNORMAL /HPF (ref 0–5)

## 2022-02-16 PROCEDURE — 2580000003 HC RX 258: Performed by: EMERGENCY MEDICINE

## 2022-02-16 PROCEDURE — 96361 HYDRATE IV INFUSION ADD-ON: CPT

## 2022-02-16 PROCEDURE — 96375 TX/PRO/DX INJ NEW DRUG ADDON: CPT

## 2022-02-16 PROCEDURE — 85025 COMPLETE CBC W/AUTO DIFF WBC: CPT

## 2022-02-16 PROCEDURE — 81001 URINALYSIS AUTO W/SCOPE: CPT

## 2022-02-16 PROCEDURE — 36415 COLL VENOUS BLD VENIPUNCTURE: CPT

## 2022-02-16 PROCEDURE — 6360000002 HC RX W HCPCS: Performed by: EMERGENCY MEDICINE

## 2022-02-16 PROCEDURE — 84703 CHORIONIC GONADOTROPIN ASSAY: CPT

## 2022-02-16 PROCEDURE — 96374 THER/PROPH/DIAG INJ IV PUSH: CPT

## 2022-02-16 PROCEDURE — 6360000004 HC RX CONTRAST MEDICATION: Performed by: EMERGENCY MEDICINE

## 2022-02-16 PROCEDURE — 99284 EMERGENCY DEPT VISIT MOD MDM: CPT

## 2022-02-16 PROCEDURE — 74177 CT ABD & PELVIS W/CONTRAST: CPT

## 2022-02-16 PROCEDURE — 83690 ASSAY OF LIPASE: CPT

## 2022-02-16 PROCEDURE — 80053 COMPREHEN METABOLIC PANEL: CPT

## 2022-02-16 RX ORDER — PANTOPRAZOLE SODIUM 40 MG/1
TABLET, DELAYED RELEASE ORAL
COMMUNITY
Start: 2022-02-02

## 2022-02-16 RX ORDER — ONDANSETRON 4 MG/1
4 TABLET, FILM COATED ORAL 3 TIMES DAILY PRN
Qty: 15 TABLET | Refills: 0 | Status: SHIPPED | OUTPATIENT
Start: 2022-02-16 | End: 2022-03-16

## 2022-02-16 RX ORDER — DICYCLOMINE HYDROCHLORIDE 10 MG/1
10 CAPSULE ORAL 4 TIMES DAILY
Qty: 40 CAPSULE | Refills: 0 | Status: SHIPPED | OUTPATIENT
Start: 2022-02-16 | End: 2022-03-16

## 2022-02-16 RX ORDER — SODIUM CHLORIDE, SODIUM LACTATE, POTASSIUM CHLORIDE, AND CALCIUM CHLORIDE .6; .31; .03; .02 G/100ML; G/100ML; G/100ML; G/100ML
1000 INJECTION, SOLUTION INTRAVENOUS ONCE
Status: COMPLETED | OUTPATIENT
Start: 2022-02-16 | End: 2022-02-16

## 2022-02-16 RX ORDER — SERTRALINE HYDROCHLORIDE 100 MG/1
TABLET, FILM COATED ORAL
COMMUNITY
Start: 2022-02-02 | End: 2022-03-16

## 2022-02-16 RX ORDER — ONDANSETRON 2 MG/ML
8 INJECTION INTRAMUSCULAR; INTRAVENOUS ONCE
Status: COMPLETED | OUTPATIENT
Start: 2022-02-16 | End: 2022-02-16

## 2022-02-16 RX ORDER — OMEPRAZOLE 20 MG/1
CAPSULE, DELAYED RELEASE ORAL
COMMUNITY
Start: 2021-06-03 | End: 2022-03-16

## 2022-02-16 RX ORDER — MORPHINE SULFATE 4 MG/ML
4 INJECTION, SOLUTION INTRAMUSCULAR; INTRAVENOUS ONCE
Status: COMPLETED | OUTPATIENT
Start: 2022-02-16 | End: 2022-02-16

## 2022-02-16 RX ORDER — CLONAZEPAM 1 MG/1
TABLET ORAL
COMMUNITY
Start: 2022-02-02 | End: 2022-03-16

## 2022-02-16 RX ADMIN — SODIUM CHLORIDE, SODIUM LACTATE, POTASSIUM CHLORIDE, AND CALCIUM CHLORIDE 1000 ML: .6; .31; .03; .02 INJECTION, SOLUTION INTRAVENOUS at 08:37

## 2022-02-16 RX ADMIN — IOPAMIDOL 75 ML: 755 INJECTION, SOLUTION INTRAVENOUS at 09:21

## 2022-02-16 RX ADMIN — ONDANSETRON HYDROCHLORIDE 8 MG: 2 INJECTION, SOLUTION INTRAMUSCULAR; INTRAVENOUS at 08:37

## 2022-02-16 RX ADMIN — MORPHINE SULFATE 4 MG: 4 INJECTION, SOLUTION INTRAMUSCULAR; INTRAVENOUS at 08:37

## 2022-02-16 ASSESSMENT — PAIN DESCRIPTION - PROGRESSION: CLINICAL_PROGRESSION: GRADUALLY IMPROVING

## 2022-02-16 ASSESSMENT — PAIN SCALES - GENERAL: PAINLEVEL_OUTOF10: 7

## 2022-02-16 NOTE — ED NOTES
Pt AVS, rx and follow up reviewed, pt expressed understanding. Pt d/c at this time.       Gamaliel El RN  02/16/22 [id number]

## 2022-02-16 NOTE — Clinical Note
Shaian Lee was seen and treated in our emergency department on 2/16/2022. She may return to work on 02/17/2022. If you have any questions or concerns, please don't hesitate to call.       Brandon Reed MD

## 2022-02-16 NOTE — ED PROVIDER NOTES
CHIEF COMPLAINT  Abdominal Pain (Pt c/o 7/10 R sided abd pain since yesterday. Pt reports nausea, no v/d at this time. Pt denies any fever, frequency, or blood in urine.)      HISTORY OF PRESENT ILLNESS  Shan Ferrari is a 40 y.o. female with a history of ovarian cyst, endometriosis, obesity, anxiety, prior  section who presents emerged department for evaluation of abdominal pain. She states that the abdominal pain has been ongoing for the past 2 or 3 days. She states that food tends to make the pain worse and she has been sipping on water. She denies any fevers. She states that she still has her gallbladder. No history of kidney stones. She has been taking ibuprofen at home without significant improvement of her pain. She has been nauseous. She denies any diarrhea. She rates the pain as a 7 out of 10 which starts in the anterior aspect of the right abdomen and wraps around the right flank.   Past Medical History:   Diagnosis Date    Allergic rhinitis     Ankle fracture, right     Anxiety     Anxiety     Cardiac arrhythmia due to congenital heart disease     COPD (chronic obstructive pulmonary disease) (HCC)     Depression     Diverticulitis     Dizziness     Endometriosis     GERD (gastroesophageal reflux disease)     Kidney stone     OCD (obsessive compulsive disorder)     Ovarian cyst     Recurrent upper respiratory infection (URI)     Sinus arrhythmia      Past Surgical History:   Procedure Laterality Date     SECTION      FRACTURE SURGERY      right ankle    KNEE SURGERY Left 2021    LEFT KNEE VIDEO ARTHROSCOPY, ANTERIOR CRUCIATE LIGAMENT RECONSTRUCTION WITH BONE TO BONE AUTOGRAFT performed by Mitch oBne MD at [address]  10/16/2018    excision lower abdominal mass    NE OFFICE/OUTPT VISIT,PROCEDURE ONLY N/A 10/16/2018    EXCISION ABDOMINAL WALL MASS performed by Kaitlin Cobb MD at [facility] OR     Family History Problem Relation Age of Onset    Arthritis Mother     High Blood Pressure Mother     Heart Disease Father     Heart Disease Maternal Grandmother     Breast Cancer Maternal Grandmother 48    Breast Cancer Maternal Aunt     Breast Cancer Paternal Aunt 36    Breast Cancer Paternal Aunt 40     Social History     Socioeconomic History    Marital status:      Spouse name: Not on file    Number of children: Not on file    Years of education: Not on file    Highest education level: Not on file   Occupational History    Not on file   Tobacco Use    Smoking status: Former Smoker     Packs/day: 0.25     Years: 9.00     Pack years: 2.25     Types: Cigarettes     Quit date: 2017     Years since quittin.0    Smokeless tobacco: Never Used   Vaping Use    Vaping Use: Never used   Substance and Sexual Activity    Alcohol use: Yes     Comment: occasional     Drug use: No    Sexual activity: Yes     Partners: Male   Other Topics Concern    Not on file   Social History Narrative    Not on file     Social Determinants of Health     Financial Resource Strain:     Difficulty of Paying Living Expenses: Not on file   Food Insecurity:     Worried About Running Out of Food in the Last Year: Not on file    Huy of Food in the Last Year: Not on file   Transportation Needs:     Lack of Transportation (Medical): Not on file    Lack of Transportation (Non-Medical):  Not on file   Physical Activity:     Days of Exercise per Week: Not on file    Minutes of Exercise per Session: Not on file   Stress:     Feeling of Stress : Not on file   Social Connections:     Frequency of Communication with Friends and Family: Not on file    Frequency of Social Gatherings with Friends and Family: Not on file    Attends Islam Services: Not on file    Active Member of Clubs or Organizations: Not on file    Attends Club or Organization Meetings: Not on file    Marital Status: Not on file   Intimate Partner Violence:     Fear of Current or Ex-Partner: Not on file    Emotionally Abused: Not on file    Physically Abused: Not on file    Sexually Abused: Not on file   Housing Stability:     Unable to Pay for Housing in the Last Year: Not on file    Number of Places Lived in the Last Year: Not on file    Unstable Housing in the Last Year: Not on file     No current facility-administered medications for this encounter. Current Outpatient Medications   Medication Sig Dispense Refill    omeprazole (PRILOSEC) 20 MG delayed release capsule take 1 capsule by oral route  every day on empty stomach      ondansetron (ZOFRAN) 4 MG tablet Take 1 tablet by mouth 3 times daily as needed for Nausea or Vomiting 15 tablet 0    dicyclomine (BENTYL) 10 MG capsule Take 1 capsule by mouth 4 times daily for 10 days 40 capsule 0    clonazePAM (KLONOPIN) 1 MG tablet       pantoprazole (PROTONIX) 40 MG tablet       ALBUTEROL IN Inhale 2 puffs into the lungs      sertraline (ZOLOFT) 100 MG tablet       omeprazole (PRILOSEC) 20 MG delayed release capsule Take 20 mg by mouth daily (Patient not taking: Reported on 2/9/2022)      sertraline (ZOLOFT) 50 MG tablet Take 100 mg by mouth 2 times daily       clonazePAM (KLONOPIN) 0.5 MG tablet Take 1 tablet by mouth every 6 hours as needed for Anxiety (Patient taking differently: Take 1 mg by mouth every 6 hours as needed for Anxiety ) 1 tablet 0     Allergies   Allergen Reactions    Bactrim Ds [Sulfamethoxazole-Trimethoprim] Anaphylaxis    Duloxetine Anaphylaxis    Hydromorphone Hcl      Other reaction(s): (moderate to severe)    Ethinyl Estradiol     Norgestimate     Other      States she is allergic to orthotrycycline    Sulfa Antibiotics     Trimethoprim      Other reaction(s): Trouble Breathing       REVIEW OF SYSTEMS  Positive and pertinent negatives as per HPI. All other systems were reviewed and are negative.     PHYSICAL EXAM  /69   Pulse 97   Temp 97.8 °F (36.6 °C) (Oral)   Resp 18   Ht 5' 2\" (1.575 m)   Wt 169 lb (76.7 kg)   SpO2 100%   BMI 30.91 kg/m²   GENERAL APPEARANCE: Awake and alert. Cooperative. HEAD: Normocephalic. Atraumatic. HEART: RRR. No harsh murmurs. Intact radial pulses 2+ bilaterally. LUNGS: Respirations unlabored without accessory muscle use. Speaking comfortably in full sentences. ABDOMEN: Soft. Non-distended. Tenderness in the right upper quadrant, epigastric region. There is no CVA tenderness. No guarding or rebound. EXTREMITIES: No peripheral edema. No acute deformities. SKIN: Warm and dry. No acute rashes. NEUROLOGICAL: Alert and oriented X 3. No focal deficits  LABS  I have reviewed all labs for this visit.    Results for orders placed or performed during the hospital encounter of 02/16/22   CBC Auto Differential   Result Value Ref Range    WBC 4.3 4.0 - 11.0 K/uL    RBC 4.40 4.00 - 5.20 M/uL    Hemoglobin 12.4 12.0 - 16.0 g/dL    Hematocrit 37.0 36.0 - 48.0 %    MCV 84.1 80.0 - 100.0 fL    MCH 28.1 26.0 - 34.0 pg    MCHC 33.4 31.0 - 36.0 g/dL    RDW 14.4 12.4 - 15.4 %    Platelets 026 983 - 996 K/uL    MPV 8.5 5.0 - 10.5 fL    Neutrophils % 50.0 %    Lymphocytes % 39.7 %    Monocytes % 7.2 %    Eosinophils % 2.1 %    Basophils % 1.0 %    Neutrophils Absolute 2.2 1.7 - 7.7 K/uL    Lymphocytes Absolute 1.7 1.0 - 5.1 K/uL    Monocytes Absolute 0.3 0.0 - 1.3 K/uL    Eosinophils Absolute 0.1 0.0 - 0.6 K/uL    Basophils Absolute 0.0 0.0 - 0.2 K/uL   Comprehensive Metabolic Panel w/ Reflex to MG   Result Value Ref Range    Sodium 138 136 - 145 mmol/L    Potassium reflex Magnesium 4.4 3.5 - 5.1 mmol/L    Chloride 105 99 - 110 mmol/L    CO2 21 21 - 32 mmol/L    Anion Gap 12 3 - 16    Glucose 95 70 - 99 mg/dL    BUN 13 7 - 20 mg/dL    CREATININE 0.7 0.6 - 1.1 mg/dL    GFR Non-African American >60 >60    GFR African American >60 >60    Calcium 9.0 8.3 - 10.6 mg/dL    Total Protein 7.1 6.4 - 8.2 g/dL    Albumin 4.3 3.4 - 5.0 g/dL Albumin/Globulin Ratio 1.5 1.1 - 2.2    Total Bilirubin <0.2 0.0 - 1.0 mg/dL    Alkaline Phosphatase 55 40 - 129 U/L    ALT 10 10 - 40 U/L    AST 21 15 - 37 U/L   Lipase   Result Value Ref Range    Lipase 53.0 13.0 - 60.0 U/L   Urinalysis, reflex to microscopic   Result Value Ref Range    Color, UA Yellow Straw/Yellow    Clarity, UA Clear Clear    Glucose, Ur Negative Negative mg/dL    Bilirubin Urine SMALL (A) Negative    Ketones, Urine 40 (A) Negative mg/dL    Specific Gravity, UA 1.020 1.005 - 1.030    Blood, Urine SMALL (A) Negative    pH, UA 6.0 5.0 - 8.0    Protein, UA Negative Negative mg/dL    Urobilinogen, Urine 0.2 <2.0 E.U./dL    Nitrite, Urine Negative Negative    Leukocyte Esterase, Urine Negative Negative    Microscopic Examination YES     Urine Type NotGiven    Pregnancy, Urine   Result Value Ref Range    HCG(Urine) Pregnancy Test Negative Detects HCG level >20 MIU/mL   Microscopic Urinalysis   Result Value Ref Range    Mucus, UA 1+ (A) None Seen /LPF    WBC, UA 0-2 0 - 5 /HPF    RBC, UA 3-4 0 - 4 /HPF    Epithelial Cells, UA 2-5 0 - 5 /HPF    Bacteria, UA 1+ (A) None Seen /HPF         RADIOLOGY  X-RAYS:  I have reviewed radiologic plain film image(s). ALL OTHER NON-PLAIN FILM IMAGES SUCH AS CT, ULTRASOUND AND MRI HAVE BEEN READ BY THE RADIOLOGIST. CT ABDOMEN PELVIS W IV CONTRAST Additional Contrast? None   Final Result   No acute intra-abdominal abnormality                  ED COURSE/MDM  Patient seen and evaluated. Old records reviewed. Labs and imaging reviewed and results discussed with patient. 15-year-old female presenting for evaluation of right upper quadrant, right flank pain. She arrives with stable vital signs. She is afebrile. She appears uncomfortable secondary to right upper quadrant pain.   On exam, she has right upper quadrant tenderness, no CVA tenderness otherwise benign abdominal exam.  ED evaluation will basic labs, abdominal labs, CT abdomen pelvis with IV contrast.  She will receive morphine, Zofran and fluids for symptomatic management while diagnostic work-up is pending. Laboratory evaluation was unremarkable. Lipase within normal limits,, electrolytes and kidney function within normal limits. Urinalysis reveals ketones. Pregnancy screen is negative. Liver function tests within normal limits. CT abdomen pelvis without acute process. The laboratory evaluation, CAT scan was explained to the patient. Explained to the patient while we ruled out gallbladder pathology, renal pathology a specific reason for her symptoms was not found. She does have some tenderness to palpation of the intercostal margin which could be related to musculoskeletal pain versus intestinal spasm. Patient will be initiated on nausea, Bentyl, Tylenol and advised on PCP follow-up. The patient will be discharged from the emergency department. The patient was counseled on their diagnosis and any medications prescribed. They were advised on the need for PCP followup. They were counseled on the need to return to the emergency department if any of their symptoms were to worsen, change or have any other concerns. Discharged in stable condition. CLINICAL IMPRESSION  1. Abdominal pain, unspecified abdominal location    2. Nausea        Blood pressure 110/69, pulse 97, temperature 97.8 °F (36.6 °C), temperature source Oral, resp. rate 18, height 5' 2\" (1.575 m), weight 169 lb (76.7 kg), SpO2 100 %, not currently breastfeeding. DISPOSITION  Shan Ferrari was discharged to home in stable condition. This chart was generated in part by using Dragon Dictation system and may contain errors related to that system including errors in grammar, punctuation, and spelling, as well as words and phrases that may be inappropriate. If there are any questions or concerns please feel free to contact the dictating provider for clarification.      Pablito Saravia MD  02/16/22 [id number]

## 2022-03-02 ENCOUNTER — HOSPITAL ENCOUNTER (OUTPATIENT)
Dept: NON INVASIVE DIAGNOSTICS | Age: 37
Discharge: HOME | End: 2022-03-02

## 2022-03-16 ENCOUNTER — HOSPITAL ENCOUNTER (EMERGENCY)
Age: 37
Discharge: HOME | End: 2022-03-16
Attending: EMERGENCY MEDICINE
Payer: MEDICAID

## 2022-03-16 ENCOUNTER — APPOINTMENT (OUTPATIENT)
Dept: GENERAL RADIOLOGY | Age: 37
End: 2022-03-16
Payer: MEDICAID

## 2022-03-16 VITALS
BODY MASS INDEX: 28.35 KG/M2 | RESPIRATION RATE: 14 BRPM | SYSTOLIC BLOOD PRESSURE: 108 MMHG | HEIGHT: 63 IN | OXYGEN SATURATION: 100 % | TEMPERATURE: 98.4 F | HEART RATE: 89 BPM | DIASTOLIC BLOOD PRESSURE: 62 MMHG | WEIGHT: 160 LBS

## 2022-03-16 DIAGNOSIS — S89.90XA: Primary | ICD-10-CM

## 2022-03-16 PROCEDURE — 73560 X-RAY EXAM OF KNEE 1 OR 2: CPT

## 2022-03-16 PROCEDURE — 99284 EMERGENCY DEPT VISIT MOD MDM: CPT

## 2022-03-16 PROCEDURE — 6370000000 HC RX 637 (ALT 250 FOR IP): Performed by: EMERGENCY MEDICINE

## 2022-03-16 RX ORDER — OXYCODONE HYDROCHLORIDE AND ACETAMINOPHEN 5; 325 MG/1; MG/1
2 TABLET ORAL ONCE
Status: COMPLETED | OUTPATIENT
Start: 2022-03-16 | End: 2022-03-16

## 2022-03-16 RX ORDER — IBUPROFEN 400 MG/1
400 TABLET ORAL ONCE
Status: COMPLETED | OUTPATIENT
Start: 2022-03-16 | End: 2022-03-16

## 2022-03-16 RX ORDER — MELOXICAM 7.5 MG/1
7.5 TABLET ORAL DAILY
Qty: 90 TABLET | Refills: 1 | Status: SHIPPED | OUTPATIENT
Start: 2022-03-16 | End: 2022-10-21

## 2022-03-16 RX ORDER — OXYCODONE HYDROCHLORIDE AND ACETAMINOPHEN 5; 325 MG/1; MG/1
1 TABLET ORAL EVERY 6 HOURS PRN
Qty: 12 TABLET | Refills: 0 | Status: SHIPPED | OUTPATIENT
Start: 2022-03-16 | End: 2022-03-19

## 2022-03-16 RX ADMIN — OXYCODONE HYDROCHLORIDE AND ACETAMINOPHEN 2 TABLET: 5; 325 TABLET ORAL at 12:23

## 2022-03-16 RX ADMIN — IBUPROFEN 400 MG: 400 TABLET, FILM COATED ORAL at 12:23

## 2022-03-16 ASSESSMENT — PAIN DESCRIPTION - PAIN TYPE: TYPE: ACUTE PAIN

## 2022-03-16 ASSESSMENT — PAIN DESCRIPTION - LOCATION: LOCATION: KNEE;LEG

## 2022-03-16 ASSESSMENT — PAIN DESCRIPTION - FREQUENCY: FREQUENCY: CONTINUOUS

## 2022-03-16 ASSESSMENT — PAIN DESCRIPTION - PROGRESSION: CLINICAL_PROGRESSION: NOT CHANGED

## 2022-03-16 ASSESSMENT — PAIN - FUNCTIONAL ASSESSMENT: PAIN_FUNCTIONAL_ASSESSMENT: 0-10

## 2022-03-16 ASSESSMENT — PAIN DESCRIPTION - DESCRIPTORS: DESCRIPTORS: RADIATING

## 2022-03-16 ASSESSMENT — PAIN DESCRIPTION - ONSET: ONSET: ON-GOING

## 2022-03-16 ASSESSMENT — PAIN SCALES - GENERAL
PAINLEVEL_OUTOF10: 7
PAINLEVEL_OUTOF10: 7

## 2022-03-16 NOTE — ED NOTES
AVS provided and reviewed with the patient. The patient verbalized understanding of care at home, follow up care, and emergent symptoms to return for. The patient verbalized understanding of medication side effects and restrictions. No questions or concerns verbalized at this time. The patient is alert, oriented, stable, and ambulatory out of the department on crutches at the time of discharge. Discharged with prescriptions x2.      Evelin Mir RN  03/16/22 [id number]

## 2022-03-18 NOTE — ED PROVIDER NOTES
eMERGENCY dEPARTMENT eNCOUnter        [facility]    Chief Complaint   Patient presents with    Leg Injury     Trip and fall today caused by her dog with pain at the left knee radiating down the leg       HPI    Shira Camargo is a 40 y.o. female who presents to the ER for evaluation of acute left knee injury status post recent ACL repair meniscal repair acute blunt trauma complaining of severe pain of the left knee. Occurred just prior to arrival.  No sensory or motor deficit. Exacerbation with movement. REVIEW OF SYSTEMS    See HPI for further details. Review of systems otherwise negative.      PAST MEDICAL HISTORY    Past Medical History:   Diagnosis Date    Allergic rhinitis     Ankle fracture, right     Anxiety     Anxiety     Cardiac arrhythmia due to congenital heart disease     COPD (chronic obstructive pulmonary disease) (HCC)     Depression     Diverticulitis     Dizziness     Endometriosis     GERD (gastroesophageal reflux disease)     Kidney stone     OCD (obsessive compulsive disorder)     Ovarian cyst     Recurrent upper respiratory infection (URI)     Sinus arrhythmia        SURGICAL HISTORY    Past Surgical History:   Procedure Laterality Date     SECTION      FRACTURE SURGERY      right ankle    KNEE SURGERY Left 2021    LEFT KNEE VIDEO ARTHROSCOPY, ANTERIOR CRUCIATE LIGAMENT RECONSTRUCTION WITH BONE TO BONE AUTOGRAFT performed by Ben bIrahim MD at [address]   10/16/2018    excision lower abdominal mass    HI OFFICE/OUTPT VISIT,PROCEDURE ONLY N/A 10/16/2018    EXCISION ABDOMINAL WALL MASS performed by Richard Mcmullen MD at [facility]    Current Outpatient Rx   Medication Sig Dispense Refill    meloxicam (MOBIC) 7.5 MG tablet Take 1 tablet by mouth daily 90 tablet 1    oxyCODONE-acetaminophen (PERCOCET) 5-325 MG per tablet Take 1 tablet by mouth every 6 hours as needed for Pain for up to 3 days. Intended supply: 3 days.  Take lowest dose possible to manage pain 12 tablet 0    pantoprazole (PROTONIX) 40 MG tablet       sertraline (ZOLOFT) 50 MG tablet Take 100 mg by mouth 2 times daily       clonazePAM (KLONOPIN) 0.5 MG tablet Take 1 tablet by mouth every 6 hours as needed for Anxiety (Patient taking differently: Take 1 mg by mouth every 6 hours as needed for Anxiety ) 1 tablet 0       ALLERGIES    Allergies   Allergen Reactions    Bactrim Ds [Sulfamethoxazole-Trimethoprim] Anaphylaxis    Duloxetine Anaphylaxis    Hydromorphone Hcl      Other reaction(s): (moderate to severe)    Ethinyl Estradiol     Norgestimate     Other      States she is allergic to orthotrycycline    Sulfa Antibiotics     Trimethoprim      Other reaction(s): Trouble Breathing       FAMILY HISTORY    Family History   Problem Relation Age of Onset    Arthritis Mother     High Blood Pressure Mother     Heart Disease Father     Heart Disease Maternal Grandmother     Breast Cancer Maternal Grandmother 48    Breast Cancer Maternal Aunt     Breast Cancer Paternal Aunt 36    Breast Cancer Paternal Aunt 40       SOCIAL HISTORY    Social History     Socioeconomic History    Marital status:      Spouse name: None    Number of children: None    Years of education: None    Highest education level: None   Occupational History    None   Tobacco Use    Smoking status: Former Smoker     Packs/day: 0.25     Years: 9.00     Pack years: 2.25     Types: Cigarettes, Pipe     Quit date: 2017     Years since quittin.1    Smokeless tobacco: Never Used   Vaping Use    Vaping Use: Never used   Substance and Sexual Activity    Alcohol use: Yes     Comment: occasional     Drug use: No    Sexual activity: Yes     Partners: Male   Other Topics Concern    None   Social History Narrative    None     Social Determinants of Health     Financial Resource Strain:     Difficulty of Paying Living Expenses: Not on file Food Insecurity:     Worried About Running Out of Food in the Last Year: Not on file    Huy of Food in the Last Year: Not on file   Transportation Needs:     Lack of Transportation (Medical): Not on file    Lack of Transportation (Non-Medical): Not on file   Physical Activity:     Days of Exercise per Week: Not on file    Minutes of Exercise per Session: Not on file   Stress:     Feeling of Stress : Not on file   Social Connections:     Frequency of Communication with Friends and Family: Not on file    Frequency of Social Gatherings with Friends and Family: Not on file    Attends Religion Services: Not on file    Active Member of [address] [place] [address] [employer] or Organizations: Not on file    Attends Club or Organization Meetings: Not on file    Marital Status: Not on file   Intimate Partner Violence:     Fear of Current or Ex-Partner: Not on file    Emotionally Abused: Not on file    Physically Abused: Not on file    Sexually Abused: Not on file   Housing Stability:     Unable to Pay for Housing in the Last Year: Not on file    Number of [place] in the Last Year: Not on file    Unstable Housing in the Last Year: Not on file       PHYSICAL EXAM    VITAL SIGNS: /62   Pulse 89   Temp 98.4 °F (36.9 °C) (Oral)   Resp 14   Ht 5' 2.5\" (1.588 m)   Wt 160 lb (72.6 kg)   SpO2 100%   BMI 28.80 kg/m²   Constitutional:  Well developed, well nourished, no acute distress, non-toxic appearance   HENT:  Atraumatic, external ears normal, nose normal, oropharynx moist.  Neck- normal range of motion, no tenderness, supple   Respiratory:  No respiratory distress, normal breath sounds. Cardiovascular:  Normal rate, normal rhythm, no murmurs, no gallops, no rubs   GI:  Soft, nondistended, normal bowel sounds, nontender   Musculoskeletal: Negative anterior drawer sign, mild tenderness with valgus and varus stress.   Integument:  Well hydrated, no rash   Neurologic: Antalgic gait, no other sensory or motor deficit    RADIOLOGY/PROCEDURES    Xray : no fx /dislocation      ED COURSE & MEDICAL DECISION MAKING    Pertinent Labs & Imaging studies reviewed. (See chart for details)  Patient presents ER for evaluation acute blunt trauma, with possible mild meniscal strain sprain strain, contusion, outpatient follow-up with her treating orthopedics, MRI if continued symptoms. FINAL IMPRESSION    1. Acute knee sprain strain  2.           Reina Elizalde MD  59/92/72 [id number]

## 2022-04-01 ENCOUNTER — APPOINTMENT (OUTPATIENT)
Dept: GENERAL RADIOLOGY | Age: 37
End: 2022-04-01
Payer: MEDICAID

## 2022-04-01 ENCOUNTER — HOSPITAL ENCOUNTER (OUTPATIENT)
Age: 37
Setting detail: OBSERVATION
Discharge: HOME | End: 2022-04-02
Attending: STUDENT IN AN ORGANIZED HEALTH CARE EDUCATION/TRAINING PROGRAM | Admitting: INTERNAL MEDICINE
Payer: MEDICAID

## 2022-04-01 DIAGNOSIS — R55 SYNCOPE AND COLLAPSE: Primary | ICD-10-CM

## 2022-04-01 PROBLEM — R42 SYNCOPAL VERTIGO: Status: ACTIVE | Noted: 2022-04-01

## 2022-04-01 LAB
A/G RATIO: 1.8 (ref 1.1–2.2)
ALBUMIN SERPL-MCNC: 4.6 G/DL (ref 3.4–5)
ALP BLD-CCNC: 54 U/L (ref 40–129)
ALT SERPL-CCNC: 10 U/L (ref 10–40)
ANION GAP SERPL CALCULATED.3IONS-SCNC: 13 MMOL/L (ref 3–16)
AST SERPL-CCNC: 10 U/L (ref 15–37)
BASE EXCESS VENOUS: 0 MMOL/L (ref -3–3)
BASOPHILS ABSOLUTE: 0 K/UL (ref 0–0.2)
BASOPHILS RELATIVE PERCENT: 0.8 %
BILIRUB SERPL-MCNC: 0.3 MG/DL (ref 0–1)
BUN BLDV-MCNC: 11 MG/DL (ref 7–20)
CALCIUM SERPL-MCNC: 9.4 MG/DL (ref 8.3–10.6)
CARBOXYHEMOGLOBIN: 5.8 % (ref 0–1.5)
CHLORIDE BLD-SCNC: 103 MMOL/L (ref 99–110)
CO2: 24 MMOL/L (ref 21–32)
CREAT SERPL-MCNC: 0.7 MG/DL (ref 0.6–1.1)
D DIMER: <200 NG/ML DDU (ref 0–229)
EKG ATRIAL RATE: 90 BPM
EKG DIAGNOSIS: NORMAL
EKG P AXIS: 9 DEGREES
EKG P-R INTERVAL: 124 MS
EKG Q-T INTERVAL: 352 MS
EKG QRS DURATION: 84 MS
EKG QTC CALCULATION (BAZETT): 430 MS
EKG R AXIS: 53 DEGREES
EKG T AXIS: 23 DEGREES
EKG VENTRICULAR RATE: 90 BPM
EOSINOPHILS ABSOLUTE: 0.1 K/UL (ref 0–0.6)
EOSINOPHILS RELATIVE PERCENT: 1.1 %
GFR AFRICAN AMERICAN: >60
GFR NON-AFRICAN AMERICAN: >60
GLUCOSE BLD-MCNC: 98 MG/DL (ref 70–99)
HCG QUALITATIVE: NEGATIVE
HCO3 VENOUS: 24.4 MMOL/L (ref 23–29)
HCT VFR BLD CALC: 38.1 % (ref 36–48)
HEMOGLOBIN: 12.7 G/DL (ref 12–16)
LYMPHOCYTES ABSOLUTE: 1.8 K/UL (ref 1–5.1)
LYMPHOCYTES RELATIVE PERCENT: 34.4 %
MAGNESIUM: 2.1 MG/DL (ref 1.8–2.4)
MCH RBC QN AUTO: 28.4 PG (ref 26–34)
MCHC RBC AUTO-ENTMCNC: 33.5 G/DL (ref 31–36)
MCV RBC AUTO: 84.8 FL (ref 80–100)
METHEMOGLOBIN VENOUS: 0.3 %
MONOCYTES ABSOLUTE: 0.3 K/UL (ref 0–1.3)
MONOCYTES RELATIVE PERCENT: 6.7 %
NEUTROPHILS ABSOLUTE: 3 K/UL (ref 1.7–7.7)
NEUTROPHILS RELATIVE PERCENT: 57 %
O2 CONTENT, VEN: 13 VOL %
O2 SAT, VEN: 71 %
O2 THERAPY: ABNORMAL
PCO2, VEN: 38.7 MMHG (ref 40–50)
PDW BLD-RTO: 14.9 % (ref 12.4–15.4)
PH VENOUS: 7.42 (ref 7.35–7.45)
PLATELET # BLD: 254 K/UL (ref 135–450)
PMV BLD AUTO: 8 FL (ref 5–10.5)
PO2, VEN: 36.6 MMHG (ref 25–40)
POTASSIUM REFLEX MAGNESIUM: 3.4 MMOL/L (ref 3.5–5.1)
RAPID INFLUENZA  B AGN: NEGATIVE
RAPID INFLUENZA A AGN: NEGATIVE
RBC # BLD: 4.49 M/UL (ref 4–5.2)
SARS-COV-2, NAAT: NOT DETECTED
SODIUM BLD-SCNC: 140 MMOL/L (ref 136–145)
TCO2 CALC VENOUS: 26 MMOL/L
TOTAL PROTEIN: 7.2 G/DL (ref 6.4–8.2)
TROPONIN: <0.01 NG/ML
WBC # BLD: 5.2 K/UL (ref 4–11)

## 2022-04-01 PROCEDURE — 96372 THER/PROPH/DIAG INJ SC/IM: CPT

## 2022-04-01 PROCEDURE — 85379 FIBRIN DEGRADATION QUANT: CPT

## 2022-04-01 PROCEDURE — 80053 COMPREHEN METABOLIC PANEL: CPT

## 2022-04-01 PROCEDURE — 2580000003 HC RX 258: Performed by: INTERNAL MEDICINE

## 2022-04-01 PROCEDURE — 99285 EMERGENCY DEPT VISIT HI MDM: CPT

## 2022-04-01 PROCEDURE — 93005 ELECTROCARDIOGRAM TRACING: CPT | Performed by: STUDENT IN AN ORGANIZED HEALTH CARE EDUCATION/TRAINING PROGRAM

## 2022-04-01 PROCEDURE — 84484 ASSAY OF TROPONIN QUANT: CPT

## 2022-04-01 PROCEDURE — G0378 HOSPITAL OBSERVATION PER HR: HCPCS

## 2022-04-01 PROCEDURE — 82803 BLOOD GASES ANY COMBINATION: CPT

## 2022-04-01 PROCEDURE — 6360000002 HC RX W HCPCS: Performed by: INTERNAL MEDICINE

## 2022-04-01 PROCEDURE — U0003 INFECTIOUS AGENT DETECTION BY NUCLEIC ACID (DNA OR RNA); SEVERE ACUTE RESPIRATORY SYNDROME CORONAVIRUS 2 (SARS-COV-2) (CORONAVIRUS DISEASE [COVID-19]), AMPLIFIED PROBE TECHNIQUE, MAKING USE OF HIGH THROUGHPUT TECHNOLOGIES AS DESCRIBED BY CMS-2020-01-R: HCPCS

## 2022-04-01 PROCEDURE — 93010 ELECTROCARDIOGRAM REPORT: CPT | Performed by: INTERNAL MEDICINE

## 2022-04-01 PROCEDURE — 6370000000 HC RX 637 (ALT 250 FOR IP): Performed by: STUDENT IN AN ORGANIZED HEALTH CARE EDUCATION/TRAINING PROGRAM

## 2022-04-01 PROCEDURE — 83735 ASSAY OF MAGNESIUM: CPT

## 2022-04-01 PROCEDURE — 87804 INFLUENZA ASSAY W/OPTIC: CPT

## 2022-04-01 PROCEDURE — 87635 SARS-COV-2 COVID-19 AMP PRB: CPT

## 2022-04-01 PROCEDURE — 96360 HYDRATION IV INFUSION INIT: CPT

## 2022-04-01 PROCEDURE — 36415 COLL VENOUS BLD VENIPUNCTURE: CPT

## 2022-04-01 PROCEDURE — 85025 COMPLETE CBC W/AUTO DIFF WBC: CPT

## 2022-04-01 PROCEDURE — 71045 X-RAY EXAM CHEST 1 VIEW: CPT

## 2022-04-01 PROCEDURE — 2580000003 HC RX 258: Performed by: STUDENT IN AN ORGANIZED HEALTH CARE EDUCATION/TRAINING PROGRAM

## 2022-04-01 PROCEDURE — 84703 CHORIONIC GONADOTROPIN ASSAY: CPT

## 2022-04-01 RX ORDER — SODIUM CHLORIDE 9 MG/ML
INJECTION, SOLUTION INTRAVENOUS PRN
Status: DISCONTINUED | OUTPATIENT
Start: 2022-04-01 | End: 2022-04-02 | Stop reason: HOSPADM

## 2022-04-01 RX ORDER — ONDANSETRON 4 MG/1
TABLET, FILM COATED ORAL
COMMUNITY
End: 2022-10-21

## 2022-04-01 RX ORDER — PANTOPRAZOLE SODIUM 40 MG/1
40 TABLET, DELAYED RELEASE ORAL
Status: DISCONTINUED | OUTPATIENT
Start: 2022-04-02 | End: 2022-04-02 | Stop reason: HOSPADM

## 2022-04-01 RX ORDER — SODIUM CHLORIDE 0.9 % (FLUSH) 0.9 %
5-40 SYRINGE (ML) INJECTION PRN
Status: DISCONTINUED | OUTPATIENT
Start: 2022-04-01 | End: 2022-04-02 | Stop reason: HOSPADM

## 2022-04-01 RX ORDER — 0.9 % SODIUM CHLORIDE 0.9 %
1000 INTRAVENOUS SOLUTION INTRAVENOUS ONCE
Status: DISCONTINUED | OUTPATIENT
Start: 2022-04-01 | End: 2022-04-01

## 2022-04-01 RX ORDER — MELOXICAM 7.5 MG/1
7.5 TABLET ORAL DAILY
Status: DISCONTINUED | OUTPATIENT
Start: 2022-04-02 | End: 2022-04-02 | Stop reason: HOSPADM

## 2022-04-01 RX ORDER — LANOLIN ALCOHOL/MO/W.PET/CERES
400 CREAM (GRAM) TOPICAL ONCE
Status: COMPLETED | OUTPATIENT
Start: 2022-04-01 | End: 2022-04-01

## 2022-04-01 RX ORDER — 0.9 % SODIUM CHLORIDE 0.9 %
1000 INTRAVENOUS SOLUTION INTRAVENOUS ONCE
Status: COMPLETED | OUTPATIENT
Start: 2022-04-01 | End: 2022-04-01

## 2022-04-01 RX ORDER — SODIUM CHLORIDE 0.9 % (FLUSH) 0.9 %
5-40 SYRINGE (ML) INJECTION EVERY 12 HOURS SCHEDULED
Status: DISCONTINUED | OUTPATIENT
Start: 2022-04-01 | End: 2022-04-02 | Stop reason: HOSPADM

## 2022-04-01 RX ORDER — CLONAZEPAM 0.5 MG/1
0.5 TABLET ORAL EVERY 6 HOURS PRN
Status: DISCONTINUED | OUTPATIENT
Start: 2022-04-01 | End: 2022-04-02 | Stop reason: HOSPADM

## 2022-04-01 RX ORDER — ACETAMINOPHEN 325 MG/1
650 TABLET ORAL EVERY 6 HOURS PRN
Status: DISCONTINUED | OUTPATIENT
Start: 2022-04-01 | End: 2022-04-02 | Stop reason: HOSPADM

## 2022-04-01 RX ORDER — SERTRALINE HYDROCHLORIDE 100 MG/1
100 TABLET, FILM COATED ORAL DAILY
Status: DISCONTINUED | OUTPATIENT
Start: 2022-04-01 | End: 2022-04-02 | Stop reason: HOSPADM

## 2022-04-01 RX ORDER — POTASSIUM CHLORIDE 750 MG/1
40 TABLET, EXTENDED RELEASE ORAL ONCE
Status: COMPLETED | OUTPATIENT
Start: 2022-04-01 | End: 2022-04-01

## 2022-04-01 RX ORDER — DICYCLOMINE HYDROCHLORIDE 10 MG/1
1 CAPSULE ORAL
COMMUNITY
End: 2022-08-11

## 2022-04-01 RX ORDER — ONDANSETRON 2 MG/ML
4 INJECTION INTRAMUSCULAR; INTRAVENOUS EVERY 6 HOURS PRN
Status: DISCONTINUED | OUTPATIENT
Start: 2022-04-01 | End: 2022-04-02 | Stop reason: HOSPADM

## 2022-04-01 RX ORDER — ACETAMINOPHEN 650 MG/1
650 SUPPOSITORY RECTAL EVERY 6 HOURS PRN
Status: DISCONTINUED | OUTPATIENT
Start: 2022-04-01 | End: 2022-04-02 | Stop reason: HOSPADM

## 2022-04-01 RX ORDER — ONDANSETRON 4 MG/1
4 TABLET, ORALLY DISINTEGRATING ORAL EVERY 8 HOURS PRN
Status: DISCONTINUED | OUTPATIENT
Start: 2022-04-01 | End: 2022-04-02 | Stop reason: HOSPADM

## 2022-04-01 RX ORDER — SERTRALINE HYDROCHLORIDE 100 MG/1
1 TABLET, FILM COATED ORAL
COMMUNITY
Start: 2018-08-13

## 2022-04-01 RX ORDER — POLYETHYLENE GLYCOL 3350 17 G/17G
17 POWDER, FOR SOLUTION ORAL DAILY PRN
Status: DISCONTINUED | OUTPATIENT
Start: 2022-04-01 | End: 2022-04-02 | Stop reason: HOSPADM

## 2022-04-01 RX ADMIN — Medication 400 MG: at 15:58

## 2022-04-01 RX ADMIN — ENOXAPARIN SODIUM 40 MG: 40 INJECTION SUBCUTANEOUS at 21:16

## 2022-04-01 RX ADMIN — SODIUM CHLORIDE 1000 ML: 9 INJECTION, SOLUTION INTRAVENOUS at 14:46

## 2022-04-01 RX ADMIN — SODIUM CHLORIDE, PRESERVATIVE FREE 10 ML: 5 INJECTION INTRAVENOUS at 21:16

## 2022-04-01 RX ADMIN — POTASSIUM CHLORIDE 40 MEQ: 750 TABLET, EXTENDED RELEASE ORAL at 15:58

## 2022-04-01 ASSESSMENT — PAIN DESCRIPTION - LOCATION
LOCATION: CHEST
LOCATION: CHEST

## 2022-04-01 ASSESSMENT — PAIN SCALES - GENERAL
PAINLEVEL_OUTOF10: 3
PAINLEVEL_OUTOF10: 4
PAINLEVEL_OUTOF10: 3
PAINLEVEL_OUTOF10: 0

## 2022-04-01 ASSESSMENT — PAIN - FUNCTIONAL ASSESSMENT: PAIN_FUNCTIONAL_ASSESSMENT: 0-10

## 2022-04-01 ASSESSMENT — PAIN DESCRIPTION - FREQUENCY: FREQUENCY: CONTINUOUS

## 2022-04-01 ASSESSMENT — PAIN DESCRIPTION - DESCRIPTORS: DESCRIPTORS: PRESSURE

## 2022-04-01 ASSESSMENT — PAIN DESCRIPTION - PAIN TYPE: TYPE: ACUTE PAIN

## 2022-04-01 ASSESSMENT — PAIN DESCRIPTION - ONSET: ONSET: ON-GOING

## 2022-04-01 NOTE — ED PROVIDER NOTES
[facility] EMERGENCY DEPARTMENT      CHIEF COMPLAINT  Chest Pain (Pt started having pain in her chest yesterday. Hx of mitral regurgitation. ) and Loss of Consciousness (Pt had syncopal episde in her bathroom. States she was not feeling well today)       HISTORY OF PRESENT ILLNESS  Shan Ferrari is a 40 y.o. female  who presents to the ED complaining of a syncopal episode shortly prior to arrival.  Patient states that she is not been feeling well for the last 2 days. She states that she has a feeling of being \"off. \"  She does endorse a mild cough that is nonproductive. She is occasionally feeling short of breath over the last 2 days. Denies any nausea, vomiting, fevers, chills. No chest pain, abdominal pain, diarrhea. Feels that she has been eating and drinking well recently. No other complaints, modifying factors or associated symptoms. I have reviewed the following from the nursing documentation.     Past Medical History:   Diagnosis Date    Allergic rhinitis     Ankle fracture, right     Anxiety     Anxiety     Cardiac arrhythmia due to congenital heart disease     COPD (chronic obstructive pulmonary disease) (HCC)     Depression     Diverticulitis     Dizziness     Endometriosis     GERD (gastroesophageal reflux disease)     Kidney stone     OCD (obsessive compulsive disorder)     Ovarian cyst     Recurrent upper respiratory infection (URI)     Sinus arrhythmia      Past Surgical History:   Procedure Laterality Date     SECTION      FRACTURE SURGERY      right ankle    KNEE SURGERY Left 2021    LEFT KNEE VIDEO ARTHROSCOPY, ANTERIOR CRUCIATE LIGAMENT RECONSTRUCTION WITH BONE TO BONE AUTOGRAFT performed by Cristy Campos MD at [address]  10/16/2018    excision lower abdominal mass    CA OFFICE/OUTPT VISIT,PROCEDURE ONLY N/A 10/16/2018    EXCISION ABDOMINAL WALL MASS performed by Sarahi Day MD at [facility] OR     Family History Problem Relation Age of Onset    Arthritis Mother     High Blood Pressure Mother     Heart Disease Father     Heart Disease Maternal Grandmother     Breast Cancer Maternal Grandmother 48    Breast Cancer Maternal Aunt     Breast Cancer Paternal Aunt 36    Breast Cancer Paternal Aunt 40     Social History     Socioeconomic History    Marital status:      Spouse name: Not on file    Number of children: Not on file    Years of education: Not on file    Highest education level: Not on file   Occupational History    Not on file   Tobacco Use    Smoking status: Former Smoker     Packs/day: 0.25     Years: 9.00     Pack years: 2.25     Types: Cigarettes, Pipe     Quit date: 2017     Years since quittin.2    Smokeless tobacco: Never Used   Vaping Use    Vaping Use: Never used   Substance and Sexual Activity    Alcohol use: Yes     Comment: occasional     Drug use: No    Sexual activity: Yes     Partners: Male   Other Topics Concern    Not on file   Social History Narrative    Not on file     Social Determinants of Health     Financial Resource Strain:     Difficulty of Paying Living Expenses: Not on file   Food Insecurity:     Worried About Running Out of Food in the Last Year: Not on file    Huy of Food in the Last Year: Not on file   Transportation Needs:     Lack of Transportation (Medical): Not on file    Lack of Transportation (Non-Medical):  Not on file   Physical Activity:     Days of Exercise per Week: Not on file    Minutes of Exercise per Session: Not on file   Stress:     Feeling of Stress : Not on file   Social Connections:     Frequency of Communication with Friends and Family: Not on file    Frequency of Social Gatherings with Friends and Family: Not on file    Attends [employer] Services: Not on file    Active Member of Clubs or Organizations: Not on file    Attends Club or Organization Meetings: Not on file    Marital Status: Not on file   Intimate Murmurs  ABDOMEN/GI: Soft, Non-tender, No distention  BACK: Normal inspection  EXTREMITIES: Non-Tender. Full ROM. Normal appearance. No Pedal edema  NEURO: Alert and oriented. Sensation normal. Motor normal  PSYCH: Mood normal. Affect normal.  SKIN: Color normal. No rash. Warm, Dry    LABS  I have reviewed all labs for this visit.    Results for orders placed or performed during the hospital encounter of 04/01/22   Rapid influenza A/B antigens    Specimen: Nasopharyngeal   Result Value Ref Range    Rapid Influenza A Ag Negative Negative    Rapid Influenza B Ag Negative Negative   CBC with Auto Differential   Result Value Ref Range    WBC 5.2 4.0 - 11.0 K/uL    RBC 4.49 4.00 - 5.20 M/uL    Hemoglobin 12.7 12.0 - 16.0 g/dL    Hematocrit 38.1 36.0 - 48.0 %    MCV 84.8 80.0 - 100.0 fL    MCH 28.4 26.0 - 34.0 pg    MCHC 33.5 31.0 - 36.0 g/dL    RDW 14.9 12.4 - 15.4 %    Platelets 890 850 - 022 K/uL    MPV 8.0 5.0 - 10.5 fL    Neutrophils % 57.0 %    Lymphocytes % 34.4 %    Monocytes % 6.7 %    Eosinophils % 1.1 %    Basophils % 0.8 %    Neutrophils Absolute 3.0 1.7 - 7.7 K/uL    Lymphocytes Absolute 1.8 1.0 - 5.1 K/uL    Monocytes Absolute 0.3 0.0 - 1.3 K/uL    Eosinophils Absolute 0.1 0.0 - 0.6 K/uL    Basophils Absolute 0.0 0.0 - 0.2 K/uL   Comprehensive Metabolic Panel w/ Reflex to MG   Result Value Ref Range    Sodium 140 136 - 145 mmol/L    Potassium reflex Magnesium 3.4 (L) 3.5 - 5.1 mmol/L    Chloride 103 99 - 110 mmol/L    CO2 24 21 - 32 mmol/L    Anion Gap 13 3 - 16    Glucose 98 70 - 99 mg/dL    BUN 11 7 - 20 mg/dL    CREATININE 0.7 0.6 - 1.1 mg/dL    GFR Non-African American >60 >60    GFR African American >60 >60    Calcium 9.4 8.3 - 10.6 mg/dL    Total Protein 7.2 6.4 - 8.2 g/dL    Albumin 4.6 3.4 - 5.0 g/dL    Albumin/Globulin Ratio 1.8 1.1 - 2.2    Total Bilirubin 0.3 0.0 - 1.0 mg/dL    Alkaline Phosphatase 54 40 - 129 U/L    ALT 10 10 - 40 U/L    AST 10 (L) 15 - 37 U/L   Troponin   Result Value Ref Range Troponin <0.01 <0.01 ng/mL   Blood Gas, Venous   Result Value Ref Range    pH, [place] 7.417 7.350 - 7.450    pCO2, [place] 38.7 (L) 40.0 - 50.0 mmHg    pO2, [place] 36.6 25.0 - 40.0 mmHg    HCO3, Venous 24.4 23.0 - 29.0 mmol/L    Base Excess, [place] 0.0 -3.0 - 3.0 mmol/L    O2 Sat, [place] 71 Not Established %    Carboxyhemoglobin 5.8 (H) 0.0 - 1.5 %    MetHgb, [place] 0.3 <1.5 %    TC02 (Calc), [place] 26 Not Established mmol/L    O2 Content, [place] 13 Not Established VOL %    O2 Therapy Unknown    HCG Qualitative, Serum   Result Value Ref Range    hCG Qual Negative Detects HCG level >10 MIU/mL   D-Dimer, Quantitative   Result Value Ref Range    D-Dimer, Quant <200 0 - 229 ng/mL DDU   Magnesium   Result Value Ref Range    Magnesium 2.10 1.80 - 2.40 mg/dL   EKG 12 Lead   Result Value Ref Range    Ventricular Rate 90 BPM    Atrial Rate 90 BPM    P-R Interval 124 ms    QRS Duration 84 ms    Q-T Interval 352 ms    QTc Calculation (Bazett) 430 ms    P Axis 9 degrees    R Axis 53 degrees    T Axis 23 degrees    Diagnosis       Normal sinus rhythmNormal ECGNo previous ECGs available       ECG  The Ekg interpreted by me shows  Sinus rhythm with a rate of 90 bpm.  Normal axis. No acute injury pattern. , QRS 84, QTc 430. RADIOLOGY  XR CHEST PORTABLE   Final Result   Unremarkable portable exam           ED COURSE/MDM  Patient seen and evaluated. Old records reviewed. Labs and imaging reviewed and results discussed with patient. Cardiac work-up obtained and is unremarkable. Orthostatic vital signs negative. Given 2 L IV fluid with mild improvement. Lab work reassuring. Despite patient's age, she does have history of arrhythmogenic syncope. Due to this I am concerned that she has high risk for a repeat episode of cardiogenic cause of her syncopal episode today. Consulted with cardiology, Dr. Keysha Young. He agreed with admission for observation and advised that [facility] would be an appropriate site for evaluation.   Discussed with Dr. Terence Sen, who agreed to admit the patient to his service. Patient transferred in stable condition. During the patient's ED course, the patient was given:  Medications   0.9 % sodium chloride bolus (1,000 mLs IntraVENous Not Given 4/1/22 1559)   0.9 % sodium chloride bolus (0 mLs IntraVENous Stopped 4/1/22 1554)   potassium chloride (KLOR-CON M) extended release tablet 40 mEq (40 mEq Oral Given 4/1/22 1558)   magnesium oxide (MAG-OX) tablet 400 mg (400 mg Oral Given 4/1/22 1558)        CLINICAL IMPRESSION  1. Syncope and collapse        Blood pressure 110/65, pulse 86, temperature 98.3 °F (36.8 °C), temperature source Oral, resp. rate 18, height 5' 2\" (1.575 m), weight 165 lb (74.8 kg), SpO2 100 %, not currently breastfeeding. DISPOSITION  Shan Ferrari was transferred to [facility] in stable condition. Patient was given scripts for the following medications. I counseled patient how to take these medications. New Prescriptions    No medications on file       Follow-up with:  No follow-up provider specified. DISCLAIMER: This chart was created using Dragon dictation software. Efforts were made by me to ensure accuracy, however some errors may be present due to limitations of this technology and occasionally words are not transcribed correctly.        Manny Barbosa DO  04/01/22 [id number]

## 2022-04-01 NOTE — ED NOTES
Report given to EMS. Report called to SRINIVASAN Haney at [facility] left via stretcher with EMS. VSS. No further needs.       Willain Metcalf RN  04/01/22 8311

## 2022-04-01 NOTE — ED NOTES
Pt asking to speak with physician at this time about potentially going home. Pt is concerned about . States that she is going though a divorce and doesn't have anyone other than her oldest son to keep her kids. MD aware.       Laurie Wright RN  04/01/22 7861

## 2022-04-01 NOTE — ED NOTES
Dr Rosina Munoz returned page to speak with Dr Lance Rey at this time.       Juan Pablo Mendosa RN  04/01/22 8070

## 2022-04-02 ENCOUNTER — APPOINTMENT (OUTPATIENT)
Dept: NUCLEAR MEDICINE | Age: 37
End: 2022-04-02
Payer: MEDICAID

## 2022-04-02 VITALS
RESPIRATION RATE: 18 BRPM | SYSTOLIC BLOOD PRESSURE: 120 MMHG | TEMPERATURE: 98.2 F | DIASTOLIC BLOOD PRESSURE: 75 MMHG | HEIGHT: 62 IN | WEIGHT: 165 LBS | OXYGEN SATURATION: 99 % | BODY MASS INDEX: 30.36 KG/M2 | HEART RATE: 74 BPM

## 2022-04-02 LAB
GLUCOSE BLD-MCNC: 104 MG/DL (ref 70–99)
LV EF: 58 %
LV EF: 70 %
LVEF MODALITY: NORMAL
LVEF MODALITY: NORMAL
PERFORMED ON: ABNORMAL
TROPONIN: <0.01 NG/ML

## 2022-04-02 PROCEDURE — A9502 TC99M TETROFOSMIN: HCPCS | Performed by: INTERNAL MEDICINE

## 2022-04-02 PROCEDURE — 2580000003 HC RX 258: Performed by: INTERNAL MEDICINE

## 2022-04-02 PROCEDURE — 84484 ASSAY OF TROPONIN QUANT: CPT

## 2022-04-02 PROCEDURE — 93306 TTE W/DOPPLER COMPLETE: CPT

## 2022-04-02 PROCEDURE — 96372 THER/PROPH/DIAG INJ SC/IM: CPT

## 2022-04-02 PROCEDURE — 6360000002 HC RX W HCPCS: Performed by: INTERNAL MEDICINE

## 2022-04-02 PROCEDURE — 93017 CV STRESS TEST TRACING ONLY: CPT

## 2022-04-02 PROCEDURE — G0378 HOSPITAL OBSERVATION PER HR: HCPCS

## 2022-04-02 PROCEDURE — 99220 PR INITIAL OBSERVATION CARE/DAY 70 MINUTES: CPT | Performed by: INTERNAL MEDICINE

## 2022-04-02 PROCEDURE — 36415 COLL VENOUS BLD VENIPUNCTURE: CPT

## 2022-04-02 PROCEDURE — 3430000000 HC RX DIAGNOSTIC RADIOPHARMACEUTICAL: Performed by: INTERNAL MEDICINE

## 2022-04-02 PROCEDURE — 78452 HT MUSCLE IMAGE SPECT MULT: CPT

## 2022-04-02 PROCEDURE — 99244 OFF/OP CNSLTJ NEW/EST MOD 40: CPT | Performed by: INTERNAL MEDICINE

## 2022-04-02 RX ADMIN — TETROFOSMIN 11.2 MILLICURIE: 1.38 INJECTION, POWDER, LYOPHILIZED, FOR SOLUTION INTRAVENOUS at 09:13

## 2022-04-02 RX ADMIN — SODIUM CHLORIDE, PRESERVATIVE FREE 10 ML: 5 INJECTION INTRAVENOUS at 11:20

## 2022-04-02 RX ADMIN — TETROFOSMIN 33.3 MILLICURIE: 1.38 INJECTION, POWDER, LYOPHILIZED, FOR SOLUTION INTRAVENOUS at 10:39

## 2022-04-02 RX ADMIN — ENOXAPARIN SODIUM 40 MG: 40 INJECTION SUBCUTANEOUS at 11:34

## 2022-04-02 ASSESSMENT — PAIN SCALES - GENERAL
PAINLEVEL_OUTOF10: 0

## 2022-04-02 NOTE — PROGRESS NOTES
End of shift report to oncoming RN to assume care of patient , pt resting quietly at time of transfer of care

## 2022-04-02 NOTE — PROGRESS NOTES
Rapid response called. Pt felt dizzy and almost passed out during stress test. VSS , 158/73, HR 87 , SAO2 99% pt sitting stated felt better . Dr Gilford Rivet at bedside to assist FSBS 104. No intervention needed.

## 2022-04-02 NOTE — FLOWSHEET NOTE
04/01/22 2000   Vital Signs   Temp 98 °F (36.7 °C)   Temp Source Oral   Pulse 75   Heart Rate Source Monitor   Resp 16   /60   MAP (mmHg) 83   Level of Consciousness Alert (0)   MEWS Score 1   Pain Assessment   Pain Level 3   Pain Location Chest   Pain Descriptors Pressure   Pain Onset On-going   Oxygen Therapy   SpO2 100 %   Pulse Oximeter Device Mode Continuous   O2 Device None (Room air)   New admission pt is caox3 reports midsternal pressure that is constant no changes or new concerns from admission symptoms , pt requesting food and drink at this time, call light within reach bed in low position for pt safety

## 2022-04-02 NOTE — PROGRESS NOTES
Patient taken to Stress Lab in stable condition by Transport via Wheelchair. RN educated patient on procedure.   Morning meds held til after Stress Test.

## 2022-04-02 NOTE — DISCHARGE SUMMARY
Hospital Medicine History & Physical and Discharge Summary      PCP: SHERI Casas CNP    Date of Admission: 2022    Date of Service: Pt seen/examined on 2022 and Placed in Observation. Chief Complaint:  Syncope. History Of Present Illness: The patient is a 40 y.o. female w self reported Hx of cardiogenic syncope and mitral valve prolapse, though I could not find confirmation of this in the chart, follows with EP cardiology OP, s/p prior multiple event monitor evaluations, who presented to ED with complaint of syncope. Pt reports she was in the bathroom and felt dizzy and lightheaded and hot and then woke up on the floor - her son trying to get her up. She denies any trauma. She is not sure how long she was unconscious for. She presented to ED for further evaluation.        Past Medical History:        Diagnosis Date    Allergic rhinitis     Ankle fracture, right     Anxiety     Anxiety     Cardiac arrhythmia due to congenital heart disease     COPD (chronic obstructive pulmonary disease) (HCC)     Depression     Diverticulitis     Dizziness     Endometriosis     GERD (gastroesophageal reflux disease)     Kidney stone     OCD (obsessive compulsive disorder)     Ovarian cyst     Recurrent upper respiratory infection (URI)     Sinus arrhythmia        Past Surgical History:        Procedure Laterality Date     SECTION      FRACTURE SURGERY      right ankle    KNEE SURGERY Left 2021    LEFT KNEE VIDEO ARTHROSCOPY, ANTERIOR CRUCIATE LIGAMENT RECONSTRUCTION WITH BONE TO BONE AUTOGRAFT performed by Cathryn Babb MD at [address]  10/16/2018    excision lower abdominal mass    IA OFFICE/OUTPT VISIT,PROCEDURE ONLY N/A 10/16/2018    EXCISION ABDOMINAL WALL MASS performed by Mary Mejia Latasha Parr MD at [address] [facility] [address] [facility] [address] OR       Medications Prior to Admission:    Prior to Admission medications    Medication Sig Start Date End Date Taking? Authorizing Provider   sertraline (ZOLOFT) 100 MG tablet Take 1 tablet by mouth 8/13/18  Yes Historical Provider, MD   dicyclomine (BENTYL) 10 MG capsule Take 1 capsule by mouth    Historical Provider, MD   ondansetron (ZOFRAN) 4 MG tablet Take by mouth    Historical Provider, MD   meloxicam (MOBIC) 7.5 MG tablet Take 1 tablet by mouth daily 5/13/31   Kael Nance MD   pantoprazole (PROTONIX) 40 MG tablet  2/2/22   Historical Provider, MD   clonazePAM (KLONOPIN) 0.5 MG tablet Take 1 tablet by mouth every 6 hours as needed for Anxiety  Patient taking differently: Take 1 mg by mouth every 6 hours as needed for Anxiety  1/19/17   Stephenie Page MD       Allergies:  Bactrim ds [sulfamethoxazole-trimethoprim], Duloxetine, Hydromorphone hcl, Ethinyl estradiol, Norgestimate, Other, Sulfa antibiotics, and Trimethoprim    Social History:  The patient currently lives at home    TOBACCO:   reports that she quit smoking about 5 years ago. Her smoking use included cigarettes and pipe. She has a 2.25 pack-year smoking history. She has never used smokeless tobacco.  ETOH:   reports current alcohol use. Family History:  Reviewed in detail and negative for DM, Early CAD, Cancer, CVA. Positive as follows:        Problem Relation Age of Onset    Arthritis Mother     High Blood Pressure Mother     Heart Disease Father     Heart Disease Maternal Grandmother     Breast Cancer Maternal Grandmother 48    Breast Cancer Maternal Aunt     Breast Cancer Paternal Aunt 36    Breast Cancer Paternal Aunt 40       REVIEW OF SYSTEMS:   As noted in the HPI. All other systems reviewed and negative.     PHYSICAL EXAM:    /75   Pulse 74   Temp 98.2 °F (36.8 °C) (Oral)   Resp 18   Ht 5' 2\" (1.575 m)   Wt 165 lb (74.8 kg)   LMP  (LMP Unknown)   SpO2 99%   Breastfeeding No   BMI 30.18 kg/m²     General appearance: No apparent distress appears stated age and cooperative. HEENT Normal cephalic, atraumatic without obvious deformity. Pupils equal, round, and reactive to light. Extra ocular muscles intact. Conjunctivae/corneas clear. Neck: Supple, No jugular venous distention/bruits. Trachea midline without thyromegaly or adenopathy with full range of motion. Lungs: Clear to auscultation, bilaterally without Rales/Wheezes/Rhonchi with good respiratory effort. Heart: Regular rate and rhythm with Normal S1/S2 without murmurs, rubs or gallops, point of maximum impulse non-displaced  Abdomen: Soft, non-tender or non-distended without rigidity or guarding and positive bowel sounds all four quadrants. Extremities: No clubbing, cyanosis, or edema bilaterally. Full range of motion without deformity and normal gait intact. Skin: Skin color, texture, turgor normal.  No rashes or lesions. Neurologic: Alert and oriented X 3, neurovascularly intact with sensory/motor intact upper extremities/lower extremities, bilaterally. Cranial nerves: II-XII intact, grossly non-focal.  Mental status: Alert, oriented, thought content appropriate. Capillary Refill: Acceptable  < 3 seconds  Peripheral Pulses: +3 Easily felt, not easily obliterated with pressure      CBC   Recent Labs     04/01/22  1425   WBC 5.2   HGB 12.7   HCT 38.1         RENAL  Recent Labs     04/01/22  1425      K 3.4*      CO2 24   BUN 11   CREATININE 0.7     LFT'S  Recent Labs     04/01/22  1425   AST 10*   ALT 10   BILITOT 0.3   ALKPHOS 54     COAG  No results for input(s): INR in the last 72 hours.   CARDIAC ENZYMES  Recent Labs     04/01/22  1425 04/02/22  0734   TROPONINI <0.01 <0.01       U/A:    Lab Results   Component Value Date    NITRITE NEG 07/17/2011    COLORU Yellow 02/16/2022    WBCUA 0-2 02/16/2022    RBCUA 3-4 02/16/2022    MUCUS 1+ 02/16/2022    BACTERIA 1+ 02/16/2022    CLARITYU Clear

## 2022-04-02 NOTE — PROGRESS NOTES
Dr. Cadlwell  aware of only 1 troponin of 0.01 and K+ of 4.1. OK to proceed with stress test per Mr. Caldwell . Pt educated on cardiac stress testing. Pt verbalizes understanding to cardiac stress testing. Questions and concerns addressed. Pt is agreeable to proceed with stress testing.

## 2022-04-02 NOTE — CARE COORDINATION
Review of chart for any potential discharge needs. No needs identified for discharge intervention at this time. Stress test today. MD and bedside RN  if needs arise please consult case management for discharge intervention. CM not following at this time.

## 2022-04-02 NOTE — PROGRESS NOTES
Pt tolerated second scan of stress test with no further near syncope episodes. Gelacio Dial in PCU aware.

## 2022-04-02 NOTE — PROGRESS NOTES
Patient returned to PCU. Vitals are stable. Patient requesting diet order. MD notified. Awaiting orders. Patient denies any further needs at this time. Call light within reach. Bed in Low position.  Mayra Dasilva RN

## 2022-04-02 NOTE — H&P
Hospital Medicine History & Physical      PCP: SHERI Sheikh CNP    Date of Admission: 2022    Date of Service: Pt seen/examined on 2022 and Placed in Observation. Chief Complaint:  Syncope. History Of Present Illness: The patient is a 40 y.o. female w self reported Hx of cardiogenic syncope and mitral valve prolapse, though I could not find confirmation of this in the chart, follows with EP cardiology OP, s/p prior multiple event monitor evaluations, who presented to ED with complaint of syncope. Pt reports she was in the bathroom and felt dizzy and lightheaded and hot and then woke up on the floor - her son trying to get her up. She denies any trauma. She is not sure how long she was unconscious for. She presented to ED for further evaluation.        Past Medical History:        Diagnosis Date    Allergic rhinitis     Ankle fracture, right     Anxiety     Anxiety     Cardiac arrhythmia due to congenital heart disease     COPD (chronic obstructive pulmonary disease) (HCC)     Depression     Diverticulitis     Dizziness     Endometriosis     GERD (gastroesophageal reflux disease)     Kidney stone     OCD (obsessive compulsive disorder)     Ovarian cyst     Recurrent upper respiratory infection (URI)     Sinus arrhythmia        Past Surgical History:        Procedure Laterality Date     SECTION      FRACTURE SURGERY      right ankle    KNEE SURGERY Left 2021    LEFT KNEE VIDEO ARTHROSCOPY, ANTERIOR CRUCIATE LIGAMENT RECONSTRUCTION WITH BONE TO BONE AUTOGRAFT performed by Ebonie Angeles MD at [address]  10/16/2018    excision lower abdominal mass    [place] OFFICE/OUTPT VISIT,PROCEDURE ONLY N/A 10/16/2018    EXCISION ABDOMINAL WALL MASS performed by Rodney Orellana MD at [facility] OR       Medications Prior to Admission:    Prior to Admission medications    Medication Sig Start Date End Date Taking? Authorizing Provider   sertraline (ZOLOFT) 100 MG tablet Take 1 tablet by mouth 8/13/18  Yes Historical Provider, MD   dicyclomine (BENTYL) 10 MG capsule Take 1 capsule by mouth    Historical Provider, MD   ondansetron (ZOFRAN) 4 MG tablet Take by mouth    Historical Provider, MD   meloxicam (MOBIC) 7.5 MG tablet Take 1 tablet by mouth daily 5/24/62   Enriqueta Montero MD   pantoprazole (PROTONIX) 40 MG tablet  2/2/22   Historical Provider, MD   clonazePAM (KLONOPIN) 0.5 MG tablet Take 1 tablet by mouth every 6 hours as needed for Anxiety  Patient taking differently: Take 1 mg by mouth every 6 hours as needed for Anxiety  1/19/17   Manish Mendiola MD       Allergies:  Bactrim ds [sulfamethoxazole-trimethoprim], Duloxetine, Hydromorphone hcl, Ethinyl estradiol, Norgestimate, Other, Sulfa antibiotics, and Trimethoprim    Social History:  The patient currently lives at home    TOBACCO:   reports that she quit smoking about 5 years ago. Her smoking use included cigarettes and pipe. She has a 2.25 pack-year smoking history. She has never used smokeless tobacco.  ETOH:   reports current alcohol use. Family History:  Reviewed in detail and negative for DM, Early CAD, Cancer, CVA. Positive as follows:        Problem Relation Age of Onset    Arthritis Mother     High Blood Pressure Mother     Heart Disease Father     Heart Disease Maternal Grandmother     Breast Cancer Maternal Grandmother 48    Breast Cancer Maternal Aunt     Breast Cancer Paternal Aunt 36    Breast Cancer Paternal Aunt 40       REVIEW OF SYSTEMS:   As noted in the HPI. All other systems reviewed and negative.     PHYSICAL EXAM:    /75   Pulse 74   Temp 98.2 °F (36.8 °C) (Oral)   Resp 18   Ht 5' 2\" (1.575 m)   Wt 165 lb (74.8 kg)   LMP  (LMP Unknown)   SpO2 99%   Breastfeeding No   BMI 30.18 kg/m² General appearance: No apparent distress appears stated age and cooperative. HEENT Normal cephalic, atraumatic without obvious deformity. Pupils equal, round, and reactive to light. Extra ocular muscles intact. Conjunctivae/corneas clear. Neck: Supple, No jugular venous distention/bruits. Trachea midline without thyromegaly or adenopathy with full range of motion. Lungs: Clear to auscultation, bilaterally without Rales/Wheezes/Rhonchi with good respiratory effort. Heart: Regular rate and rhythm with Normal S1/S2 without murmurs, rubs or gallops, point of maximum impulse non-displaced  Abdomen: Soft, non-tender or non-distended without rigidity or guarding and positive bowel sounds all four quadrants. Extremities: No clubbing, cyanosis, or edema bilaterally. Full range of motion without deformity and normal gait intact. Skin: Skin color, texture, turgor normal.  No rashes or lesions. Neurologic: Alert and oriented X 3, neurovascularly intact with sensory/motor intact upper extremities/lower extremities, bilaterally. Cranial nerves: II-XII intact, grossly non-focal.  Mental status: Alert, oriented, thought content appropriate. Capillary Refill: Acceptable  < 3 seconds  Peripheral Pulses: +3 Easily felt, not easily obliterated with pressure      CBC   Recent Labs     04/01/22  1425   WBC 5.2   HGB 12.7   HCT 38.1         RENAL  Recent Labs     04/01/22  1425      K 3.4*      CO2 24   BUN 11   CREATININE 0.7     LFT'S  Recent Labs     04/01/22  1425   AST 10*   ALT 10   BILITOT 0.3   ALKPHOS 54     COAG  No results for input(s): INR in the last 72 hours.   CARDIAC ENZYMES  Recent Labs     04/01/22  1425 04/02/22  0734   TROPONINI <0.01 <0.01       U/A:    Lab Results   Component Value Date    NITRITE NEG 07/17/2011    COLORU Yellow 02/16/2022    WBCUA 0-2 02/16/2022    RBCUA 3-4 02/16/2022    MUCUS 1+ 02/16/2022    BACTERIA 1+ 02/16/2022    CLARITYU Clear 02/16/2022    SPECGRAV 1.020 02/16/2022    LEUKOCYTESUR Negative 02/16/2022    BLOODU SMALL 02/16/2022    GLUCOSEU Negative 02/16/2022    GLUCOSEU Neg 10/15/2011    AMORPHOUS 3+ 03/16/2010       ABG  No results found for: MPU7FUI, BEART, W9VCXHYB, PHART, THGBART, FMV4DOK, PO2ART, JNP4GOD        Active Hospital Problems    Diagnosis Date Noted    Syncope and collapse [R55] 04/01/2022    Syncopal vertigo [R55, R42] 04/01/2022         PHYSICIANS CERTIFICATION:    I certify that Jonny Appiah is expected to be hospitalized for less than 2 midnights based on the following assessment and plan:      ASSESSMENT/PLAN:      Syncope. No clear evidence of this being cardiogenic or neurogenic. Multiple prior event monitor evaluations - no significant findings per cardioogy report. EKG NSR.   ECHO preserved EF and no signs of MVP. Had exercise stress test - became lightheaded during the test, but results do not suggest ischemia changes. Vitals have been stable. Ok to advance diet and discharge home in stable condition today. OP PCP follow up 1-2 weeks. If symptoms recurrent -    - may consider neurology evaluation OP. - may consider tilt table testing OP           Veronika Damon MD    Thank you SHERI Garcia CNP for the opportunity to be involved in this patient's care. If you have any questions or concerns please feel free to contact me at [phone].

## 2022-04-02 NOTE — PROGRESS NOTES
Patient admitted to room 301 from [facility] 4450. Patient oriented to room, call light, bed rails, phone, lights and bathroom. Patient instructed about the schedule of the day including: vital sign frequency, lab draws, possible tests, frequency of MD and staff rounds, daily weights, I &O's and prescribed diet. , patient aware of placement and reason. Telemetry box in place, patient aware of placement and reason. Bed locked, in lowest position, side rails up 2/4, call light within reach. Recliner Assessment  Patient is able to demonstrate the ability to move from a reclining position to an upright position within the recliner. 4 Eyes Skin Assessment     The patient is being assess for   Admission    I agree that 2 RN's have performed a thorough Head to Toe Skin Assessment on the patient. ALL assessment sites listed below have been assessed. Areas assessed for pressure by both nurses:   [x]   Head, Face, and Ears   [x]   Shoulders, Back, and Chest, Abdomen  [x]   Arms, Elbows, and Hands   [x]   Coccyx, Sacrum, and Ischium  [x]   Legs, Feet, and Heels        Skin Assessed Under all Medical Devices by both nurses:  no               All Mepilex Borders were peeled back and area peeked at by both nurses:  No: no dressing no wounds   Please list where Mepilex Borders are located:              **SHARE this note so that the co-signing nurse is able to place an eSignature**    Co-signer eSignature: Electronically signed by Winston Moulton RN on 4/2/22 at 12:41 AM EDT    Does the Patient have Skin Breakdown related to pressure?   No)         Richardson Prevention initiated:  Yes   Wound Care Orders initiated:  No      [facility] nurse consulted for Pressure Injury (Stage 3,4, Unstageable, DTI, NWPT, Complex wounds)and New or Established Ostomies:  No      Primary Nurse eSignature: Electronically signed by Osmar Garzon RN on 4/1/22 at 8:18 PM EDT        2

## 2022-04-02 NOTE — PROGRESS NOTES
Rapid Response called in Stress Lab. Patient stable with no intervention. No order changes Per Cardiology. Patient reported dizziness and tingling in hands.   Patient recovered with brief rest.  Continue with Stress Test per MD.  Reji Potts RN

## 2022-04-02 NOTE — CONSULTS
CARDIOLOGY CONSULTATION        Patient Name: Gilberto Gonzalez  Date of admission: 4/1/2022  2:08 PM  Admission Dx: Syncope and collapse [R55]  Syncopal vertigo [R55, R42]  Requesting Physician: Eliu Mg MD  Primary Care physician: SHERI Mcadams CNP    Reason for Consultation/Chief Complaint: Syncope    History of Present Illness:     Gilberto Gonzalez is a 40 y.o. patient who presented to the hospital with complaints of syncope. Of note patient was seen by my partner Dr. Kendall Marroquin 2/9/2022. She was seen for chief complaint of syncope and palpitations. Noted treated previously with propranolol with low blood pressure resulting. Verapamil did the same. Noted workup: She underwent a stress test on 1/27/17. Her echocardiogram on 1/16/17 showed an EF of 55%. Her cardiac event monitor starting 1/24/17 showed symptoms associated with sinus rhythm. Patient endorsed sharp left-sided chest pains at that appointment. Pain radiating to left shoulder, arm and neck. Noted to have late systolic click by exam, mitral valve prolapse syndrome. Ultimately stress test was requested but not performed. [facility] initial evaluation ED course/Vital signs on presentation: /58, heart rate 118 bpm, afebrile, sats 100% on room air. Orthostatics were negative. EKG showed normal sinus rhythm with normal intervals including QT. Today she states she is continued to have episodes of chest pain described as a substernal tightness radiating to her left neck and shoulder. She is presently having the pain. Does note this is worse with pressing on her chest and breathing deeply. She has noticed no increasing shortness of breath with activity. Tells me she had an episode of loss of consciousness yesterday. She had showered and was standing for quite a while putting on make-up and getting ready. States she was standing for likely 45 minutes prior to abrupt loss of consciousness.   She had abrupt onset dizziness, visual disturbance/blurriness, and general sense that she was going to pass out. She did have chest pain prior to loss of consciousness. Denies palpitations. Denies paroxysmal nocturnal dyspnea, orthopnea, bendopnea, increasing lower extremity edema or weight gain. Former smoker. Past Medical History:   has a past medical history of Allergic rhinitis, Ankle fracture, right, Anxiety, Anxiety, Cardiac arrhythmia due to congenital heart disease, COPD (chronic obstructive pulmonary disease) ([address] Depression, Diverticulitis, Dizziness, Endometriosis, GERD (gastroesophageal reflux disease), Kidney stone, OCD (obsessive compulsive disorder), Ovarian cyst, Recurrent upper respiratory infection (URI), and Sinus arrhythmia. Surgical History:   has a past surgical history that includes  section; fracture surgery; other surgical history (10/16/2018); pr office/outpt visit,procedure only (N/A, 10/16/2018); and knee surgery (Left, 2021). Social History:   reports that she quit smoking about 5 years ago. Her smoking use included cigarettes and pipe. She has a 2.25 pack-year smoking history. She has never used smokeless tobacco. She reports current alcohol use. She reports that she does not use drugs. Family History:  family history includes Arthritis in her mother; Breast Cancer in her maternal aunt; Breast Cancer (age of onset: 36) in her paternal aunt; Breast Cancer (age of onset: 40) in her paternal aunt; Breast Cancer (age of onset: 48) in her maternal grandmother; Heart Disease in her father and maternal grandmother; High Blood Pressure in her mother. Father history of CAD with multiple MI, multiple PCI, starting age 39.   States he has history of arrhythmia, possible short QT syndrome  Mother history of CAD with prior PCI starting in her 46s  States her daughter has short QT syndrome, evaluated previously at children  States her brother has history of coronary artery disease with MI at age 39    Home Medications:  Were reviewed and are listed in nursing record and/or below  Prior to Admission medications    Medication Sig Start Date End Date Taking? Authorizing Provider   sertraline (ZOLOFT) 100 MG tablet Take 1 tablet by mouth 8/13/18  Yes Historical Provider, MD   dicyclomine (BENTYL) 10 MG capsule Take 1 capsule by mouth    Historical Provider, MD   ondansetron (ZOFRAN) 4 MG tablet Take by mouth    Historical Provider, MD   meloxicam (MOBIC) 7.5 MG tablet Take 1 tablet by mouth daily 5/06/40   Ginger Agee MD   pantoprazole (PROTONIX) 40 MG tablet  2/2/22   Historical Provider, MD   clonazePAM (KLONOPIN) 0.5 MG tablet Take 1 tablet by mouth every 6 hours as needed for Anxiety  Patient taking differently: Take 1 mg by mouth every 6 hours as needed for Anxiety  1/19/17   Jason Dooley MD        CURRENT Medications:  clonazePAM (KLONOPIN) tablet 0.5 mg, Q6H PRN  meloxicam (MOBIC) tablet 7.5 mg, Daily  pantoprazole (PROTONIX) tablet 40 mg, QAM AC  sertraline (ZOLOFT) tablet 100 mg, Daily  sodium chloride flush 0.9 % injection 5-40 mL, 2 times per day  sodium chloride flush 0.9 % injection 5-40 mL, PRN  0.9 % sodium chloride infusion, PRN  enoxaparin (LOVENOX) injection 40 mg, Daily  ondansetron (ZOFRAN-ODT) disintegrating tablet 4 mg, Q8H PRN   Or  ondansetron (ZOFRAN) injection 4 mg, Q6H PRN  polyethylene glycol (GLYCOLAX) packet 17 g, Daily PRN  acetaminophen (TYLENOL) tablet 650 mg, Q6H PRN   Or  acetaminophen (TYLENOL) suppository 650 mg, Q6H PRN        Allergies:  Bactrim ds [sulfamethoxazole-trimethoprim], Duloxetine, Hydromorphone hcl, Ethinyl estradiol, Norgestimate, Other, Sulfa antibiotics, and Trimethoprim     Review of Systems:   A 14 point review of symptoms completed. Pertinent positives identified in the HPI, all other review of symptoms negative as below.       Objective:     Vitals:    04/01/22 1717 04/01/22 1852 04/01/22 2000 04/02/22 0502   BP: 100/86 96/75 102/60 105/70   Pulse: 86 75 75 75   Resp: 18 18 16 16   Temp:   98 °F (36.7 °C) 98 °F (36.7 °C)   TempSrc:   Oral Oral   SpO2: 100% 100% 100%    Weight:   165 lb (74.8 kg)    Height:   5' 2\" (1.575 m)       Weight: 165 lb (74.8 kg)       PHYSICAL EXAM:      General:  Alert, cooperative, no distress, appears stated age   Head:  Normocephalic, atraumatic   Eyes:  Conjunctiva/corneas clear, anicteric sclerae    Nose: Nares normal, no drainage or sinus tenderness   Throat: No abnormalities of the lips, oral mucosa or tongue. Neck: Trachea midline. Neck supple with no lymphadenopathy, thyroid not enlarged, symmetric, no tenderness/mass/nodules, no Jugular venous pressure elevation    Lungs:   Clear to auscultation bilaterally, no wheezes, no rales, no respiratory distress   Chest Wall:   Mild discomfort with palpation of the chest wall   Heart:  Regular rate and rhythm, normal S1, normal S2, no murmur, no rub, no S3/S4, PMI non-palpable   Abdomen:   Soft, non-tender, with normoactive bowel sounds. No masses, no hepatosplenomegaly   Extremities: No cyanosis, clubbing or pitting edema. Vascular: 2+ radial, 2+ dorsalis pedis and posterior tibial pulses bilaterally. Brisk carotid upstrokes without carotid bruit. Skin: Skin color, texture, turgor are normal with no rashes or ulceration. Pysch: Euthymic mood, appropriate affect   Neurologic: Oriented to person, place and time. No slurred speech or facial asymmetry. No motor or sensory deficits on gross examination.          Labs:   CBC:   Lab Results   Component Value Date    WBC 5.2 04/01/2022    RBC 4.49 04/01/2022    HGB 12.7 04/01/2022    HCT 38.1 04/01/2022    MCV 84.8 04/01/2022    RDW 14.9 04/01/2022     04/01/2022     CMP:  Lab Results   Component Value Date     04/01/2022    K 3.4 04/01/2022     04/01/2022    CO2 24 04/01/2022    BUN 11 04/01/2022    CREATININE 0.7 04/01/2022    GFRAA >60 04/01/2022 GFRAA >60 10/15/2011    AGRATIO 1.8 04/01/2022    LABGLOM >60 04/01/2022    GLUCOSE 98 04/01/2022    PROT 7.2 04/01/2022    PROT 7.9 10/15/2011    CALCIUM 9.4 04/01/2022    BILITOT 0.3 04/01/2022    ALKPHOS 54 04/01/2022    AST 10 04/01/2022    ALT 10 04/01/2022     PT/INR:  No results found for: PTINR  HgBA1c:  Lab Results   Component Value Date    LABA1C 5.8 07/28/2018     Lab Results   Component Value Date    TROPONINI <0.01 04/01/2022       Lab Results   Component Value Date    CHOL 178 07/28/2018     Lab Results   Component Value Date    TRIG 126 07/28/2018     Lab Results   Component Value Date    HDL 33 (L) 07/28/2018     Lab Results   Component Value Date    LDLCALC 120 (H) 07/28/2018     Lab Results   Component Value Date    LABVLDL 25 07/28/2018     No results found for: [facility]     Cardiac Data:     EKG: Personally reviewed as above    48 hrs holter 1/2018   This 48 hour test was scanned by Gregoria Pollard 1/30/2018 10:09. Tech comments:   1. The rhythm was sinus. Average UT interval 0.16 average QRS   duration 0.08. Average daily heart rate 85 ranging from 53 to 135 bpm.   2. There were no supraventricular ectopic beats. 3. Rare premature ventricular ectopic beats total 11 consisting   of 11 isolated PVCs. 4. No diary returned. The patient event button was hit 94 times   with no ectopy noted. 30 day event recorder from 1/24/17-2/23/17 showed average HR 84 bpm (). She transmitted 501 symptomatic episodes, all of which showed sinus rhythm. Some had single isolated PVC's. GXT 2017   Conclusions      Summary   Sinus tachycardia. Half mm ST depression in inferior leads at rest.   Patient exercised on treadmill according to standard [facility] protocol. No arrhythmia was observed with exercise. Mild dizziness, no chest pain. No EKG changes of stress induced left ventricular ischemia. Echo 2017  Summary   Normal LV systolic function with an estimated ejection fraction of 55%.    Normal left ventricular diastolic filling pressure. There is trivial mitral regurgitation. There is mild tricuspid regurgitation. Systolic pulmonary artery pressure (SPAP) is normal and estimated at 21mmHg   (RA pressure 3mmHg). Impression and Plan:      1. Syncope  2. Atypical chest pain  3. Palpitations correlating with sinus by previous monitor  4. Possible mitral valve prolapse syndrome-no evidence of MVP and only trace MR by previous echo  5. Normal LV function 2017  6. Former smoker  7. Obesity  8. Generalized anxiety disorder      Patient Active Problem List   Diagnosis    Hx of syncope & presyncope    Hx of palpitations    ANDREINA (generalized anxiety disorder)    Former smoker    Obesity (BMI 30-39. 9)    Abdominal wall mass    Syncope and collapse    Syncopal vertigo       PLAN:  1. GXT myoview today  2. Will obtain transthoracic echocardiogram for evaluation of underlying cardiac structure and function. 3.  Telemetry shows no arrhythmia    We will follow-up results of her work-up. If all negative she may be discharged. She has follow-up with Dr. Keila Jacques 5/11. We can have her call the office Monday to have monitor placed. May need to consider tilt table testing, possible ILR. I will address the patient's cardiac risk factors and adjusted pharmacologic treatment as needed. In addition, I have reinforced the need for patient directed risk factor modification. All questions and concerns were addressed to the patient/family. Alternatives to my treatment were discussed. Thank you for allowing us to participate in the care of Jonny Appiah. Please call me with any questions [phone].     Jayne Fish MD, [facility]  Cardiovascular Disease  [facility]  [phone] [facility]  [phone] [address] [place] [address]  4/2/2022 8:01 AM

## 2022-04-04 LAB — SARS-COV-2: NOT DETECTED

## 2022-08-06 ENCOUNTER — APPOINTMENT (OUTPATIENT)
Dept: GENERAL RADIOLOGY | Age: 37
End: 2022-08-06
Payer: MEDICAID

## 2022-08-06 ENCOUNTER — HOSPITAL ENCOUNTER (EMERGENCY)
Age: 37
Discharge: HOME | End: 2022-08-06
Attending: STUDENT IN AN ORGANIZED HEALTH CARE EDUCATION/TRAINING PROGRAM
Payer: MEDICAID

## 2022-08-06 VITALS
DIASTOLIC BLOOD PRESSURE: 68 MMHG | HEIGHT: 62 IN | SYSTOLIC BLOOD PRESSURE: 109 MMHG | BODY MASS INDEX: 23 KG/M2 | TEMPERATURE: 98.6 F | OXYGEN SATURATION: 100 % | RESPIRATION RATE: 16 BRPM | HEART RATE: 88 BPM | WEIGHT: 125 LBS

## 2022-08-06 DIAGNOSIS — J06.9 VIRAL URI WITH COUGH: Primary | ICD-10-CM

## 2022-08-06 DIAGNOSIS — R11.2 NON-INTRACTABLE VOMITING WITH NAUSEA, UNSPECIFIED VOMITING TYPE: ICD-10-CM

## 2022-08-06 LAB
RAPID INFLUENZA  B AGN: NEGATIVE
RAPID INFLUENZA A AGN: NEGATIVE
S PYO AG THROAT QL: NEGATIVE
SARS-COV-2, NAAT: NOT DETECTED

## 2022-08-06 PROCEDURE — 87081 CULTURE SCREEN ONLY: CPT

## 2022-08-06 PROCEDURE — 99284 EMERGENCY DEPT VISIT MOD MDM: CPT

## 2022-08-06 PROCEDURE — 87880 STREP A ASSAY W/OPTIC: CPT

## 2022-08-06 PROCEDURE — 87635 SARS-COV-2 COVID-19 AMP PRB: CPT

## 2022-08-06 PROCEDURE — 87804 INFLUENZA ASSAY W/OPTIC: CPT

## 2022-08-06 PROCEDURE — 71045 X-RAY EXAM CHEST 1 VIEW: CPT

## 2022-08-06 RX ORDER — ONDANSETRON 4 MG/1
4 TABLET, ORALLY DISINTEGRATING ORAL EVERY 8 HOURS PRN
Qty: 9 TABLET | Refills: 0 | Status: SHIPPED | OUTPATIENT
Start: 2022-08-06 | End: 2022-08-09

## 2022-08-06 NOTE — ED PROVIDER NOTES
Cancer Paternal Aunt 36    Breast Cancer Paternal Aunt 40     Social History     Socioeconomic History    Marital status:      Spouse name: Not on file    Number of children: Not on file    Years of education: Not on file    Highest education level: Not on file   Occupational History    Not on file   Tobacco Use    Smoking status: Former     Packs/day: 0.25     Years: 9.00     Pack years: 2.25     Types: Cigarettes, Pipe     Quit date: 2017     Years since quittin.5    Smokeless tobacco: Never   Vaping Use    Vaping Use: Never used   Substance and Sexual Activity    Alcohol use: Yes     Comment: occasional     Drug use: No    Sexual activity: Yes     Partners: Male   Other Topics Concern    Not on file   Social History Narrative    Not on file     Social Determinants of Health     Financial Resource Strain: Not on file   Food Insecurity: Not on file   Transportation Needs: Not on file   Physical Activity: Not on file   Stress: Not on file   Social Connections: Not on file   Intimate Partner Violence: Not on file   Housing Stability: Not on file     No current facility-administered medications for this encounter.      Current Outpatient Medications   Medication Sig Dispense Refill    ondansetron (ZOFRAN ODT) 4 MG disintegrating tablet Take 1 tablet by mouth every 8 hours as needed for Nausea or Vomiting 9 tablet 0    dicyclomine (BENTYL) 10 MG capsule Take 1 capsule by mouth (Patient not taking: Reported on 2022)      ondansetron (ZOFRAN) 4 MG tablet Take by mouth (Patient not taking: Reported on 2022)      sertraline (ZOLOFT) 100 MG tablet Take 1 tablet by mouth      meloxicam (MOBIC) 7.5 MG tablet Take 1 tablet by mouth daily (Patient not taking: Reported on 2022) 90 tablet 1    pantoprazole (PROTONIX) 40 MG tablet  (Patient not taking: Reported on 2022)      clonazePAM (KLONOPIN) 0.5 MG tablet Take 1 tablet by mouth every 6 hours as needed for Anxiety (Patient taking differently: Take 1 mg by mouth every 6 hours as needed for Anxiety.) 1 tablet 0     Allergies   Allergen Reactions    Bactrim Ds [Sulfamethoxazole-Trimethoprim] Anaphylaxis    Duloxetine Anaphylaxis    Hydromorphone Hcl      Other reaction(s): (moderate to severe)    Ethinyl Estradiol     Norgestimate     Other      States she is allergic to orthotrycycline    Sulfa Antibiotics     Trimethoprim      Other reaction(s): Trouble Breathing       REVIEW OF SYSTEMS  10 systems reviewed, pertinent positives per HPI otherwise noted to be negative. PHYSICAL EXAM  /68   Pulse 88   Temp 98.6 °F (37 °C) (Oral)   Resp 16   Ht 5' 2\" (1.575 m)   Wt 125 lb (56.7 kg)   LMP 07/29/2022   SpO2 100%   BMI 22.86 kg/m²    GENERAL APPEARANCE: Awake and alert. Cooperative. No acute distress. HENT: Normocephalic. Atraumatic. Mucous membranes are moist. Bilateral tonsillar swelling, no uvular deviation. No 70 velar swelling or difficulty tolerating secretions. No voice changes. NECK: Supple. Mild tender cervical lymphadenopathy. EYES: PERRL. EOM's grossly intact. HEART/CHEST: RRR. No murmurs. LUNGS: Respirations unlabored. CTAB. Good air exchange. Speaking comfortably in full sentences. ABDOMEN: No tenderness. Soft. Non-distended. No masses. No organomegaly. No guarding or rebound. MUSCULOSKELETAL: No extremity edema. Compartments soft. No deformity. No tenderness in the extremities. All extremities neurovascularly intact. SKIN: Warm and dry. No acute rashes. NEUROLOGICAL: Alert and oriented. CN's 2-12 intact. No gross facial drooping. Strength 5/5, sensation intact. Gait normal.  PSYCHIATRIC: Normal mood and affect. LABS  I have reviewed all labs for this visit.    Results for orders placed or performed during the hospital encounter of 08/06/22   COVID-19, Rapid    Specimen: Nasopharyngeal Swab   Result Value Ref Range    SARS-CoV-2, NAAT Not Detected Not Detected   Rapid influenza A/B antigens    Specimen: Nasopharyngeal   Result Value Ref Range    Rapid Influenza A Ag Negative Negative    Rapid Influenza B Ag Negative Negative   Strep screen group a throat    Specimen: Throat   Result Value Ref Range    Rapid Strep A Screen Negative Negative       RADIOLOGY  XR CHEST PORTABLE   Final Result   No acute cardiopulmonary disease. ED COURSE/MDM  Patient seen and evaluated. Old records reviewed. Labs and imaging reviewed and results discussed with patient. Is a 67-year-old female, presenting with several day history of nonproductive cough, body aches and chills, nausea and vomiting overnight. Full HPI as detailed above. Upon arrival in the ED, vitals reassuring. Patient is resting comfortably and is in no acute distress. No neck pain or stiffness. Her abdominal exam is benign, she has been able to tolerate oral fluids this morning since her emesis. She is afebrile and appears nontoxic. She does have bilateral tonsillar swelling without exudates. COVID, flu, and strep are negative. She has no uvular deviation, no concern for peritonsillar abscess. Chest x-ray performed and was negative for any acute concerning findings. Feel that symptoms are likely secondary to viral illness. Patient will be sent with prescription for Zofran, given return precautions for the ED and is advised to follow-up with her PCP in 1 week if symptoms or not improving. She is comfortable in agreement with plan of care and was discharged. I, Dr. Bel Rivera MD, am the primary clinician of record. Is this patient to be included in the SEP-1 Core Measure? No   Exclusion criteria - the patient is NOT to be included for SEP-1 Core Measure due to:  2+ SIRS criteria are not met     During the patient's ED course, the patient was given:  Medications - No data to display     CLINICAL IMPRESSION  1. Viral URI with cough    2.  Non-intractable vomiting with nausea, unspecified vomiting type        Blood pressure 109/68, pulse 88, temperature 98.6 °F (37 °C), temperature source Oral, resp. rate 16, height 5' 2\" (1.575 m), weight 125 lb (56.7 kg), last menstrual period 07/29/2022, SpO2 100 %, not currently breastfeeding. DISPOSITION  Shan Leonard was discharged to home in good condition. Patient was given scripts for the following medications. I counseled patient how to take these medications. New Prescriptions    ONDANSETRON (ZOFRAN ODT) 4 MG DISINTEGRATING TABLET    Take 1 tablet by mouth every 8 hours as needed for Nausea or Vomiting       Follow-up with:  Twyla Edmonds, APRN - CNP  [address]  [address] [facility]  [phone]    Schedule an appointment as soon as possible for a visit in 1 week      [facility] Emergency Department  [address]  [phone]  Go to   If symptoms worsen    DISCLAIMER: This chart was created using Dragon dictation software. Efforts were made by me to ensure accuracy, however some errors may be present due to limitations of this technology and occasionally words are not transcribed correctly.        Laure Balbuena MD  08/06/22 9595

## 2022-08-09 LAB — S PYO THROAT QL CULT: NORMAL

## 2022-08-11 ENCOUNTER — APPOINTMENT (OUTPATIENT)
Dept: CT IMAGING | Age: 37
End: 2022-08-11
Payer: MEDICAID

## 2022-08-11 ENCOUNTER — HOSPITAL ENCOUNTER (EMERGENCY)
Age: 37
Discharge: HOME | End: 2022-08-11
Attending: STUDENT IN AN ORGANIZED HEALTH CARE EDUCATION/TRAINING PROGRAM
Payer: MEDICAID

## 2022-08-11 VITALS
DIASTOLIC BLOOD PRESSURE: 67 MMHG | WEIGHT: 128 LBS | HEART RATE: 74 BPM | TEMPERATURE: 98.9 F | BODY MASS INDEX: 23.55 KG/M2 | SYSTOLIC BLOOD PRESSURE: 108 MMHG | RESPIRATION RATE: 16 BRPM | OXYGEN SATURATION: 100 % | HEIGHT: 62 IN

## 2022-08-11 DIAGNOSIS — M54.9 MUSCULOSKELETAL BACK PAIN: ICD-10-CM

## 2022-08-11 DIAGNOSIS — U07.1 COVID-19: Primary | ICD-10-CM

## 2022-08-11 LAB
A/G RATIO: 1.5 (ref 1.1–2.2)
ALBUMIN SERPL-MCNC: 5 G/DL (ref 3.4–5)
ALP BLD-CCNC: 59 U/L (ref 40–129)
ALT SERPL-CCNC: 12 U/L (ref 10–40)
ANION GAP SERPL CALCULATED.3IONS-SCNC: 12 MMOL/L (ref 3–16)
AST SERPL-CCNC: 14 U/L (ref 15–37)
BACTERIA: ABNORMAL /HPF
BASOPHILS ABSOLUTE: 0 K/UL (ref 0–0.2)
BASOPHILS RELATIVE PERCENT: 0.6 %
BILIRUB SERPL-MCNC: 0.3 MG/DL (ref 0–1)
BILIRUBIN URINE: NEGATIVE
BLOOD, URINE: ABNORMAL
BUN BLDV-MCNC: 10 MG/DL (ref 7–20)
CALCIUM SERPL-MCNC: 9.7 MG/DL (ref 8.3–10.6)
CHLORIDE BLD-SCNC: 103 MMOL/L (ref 99–110)
CLARITY: CLEAR
CO2: 23 MMOL/L (ref 21–32)
COLOR: ABNORMAL
CREAT SERPL-MCNC: 0.7 MG/DL (ref 0.6–1.1)
EOSINOPHILS ABSOLUTE: 0 K/UL (ref 0–0.6)
EOSINOPHILS RELATIVE PERCENT: 0.6 %
EPITHELIAL CELLS, UA: ABNORMAL /HPF (ref 0–5)
GFR AFRICAN AMERICAN: >60
GFR NON-AFRICAN AMERICAN: >60
GLUCOSE BLD-MCNC: 92 MG/DL (ref 70–99)
GLUCOSE URINE: NEGATIVE MG/DL
HCG QUALITATIVE: NEGATIVE
HCT VFR BLD CALC: 40.7 % (ref 36–48)
HEMOGLOBIN: 13.6 G/DL (ref 12–16)
INFLUENZA A: NOT DETECTED
INFLUENZA B: NOT DETECTED
KETONES, URINE: ABNORMAL MG/DL
LEUKOCYTE ESTERASE, URINE: NEGATIVE
LIPASE: 36 U/L (ref 13–60)
LYMPHOCYTES ABSOLUTE: 0.3 K/UL (ref 1–5.1)
LYMPHOCYTES RELATIVE PERCENT: 4.6 %
MCH RBC QN AUTO: 29 PG (ref 26–34)
MCHC RBC AUTO-ENTMCNC: 33.4 G/DL (ref 31–36)
MCV RBC AUTO: 86.8 FL (ref 80–100)
MICROSCOPIC EXAMINATION: YES
MONOCYTES ABSOLUTE: 0.5 K/UL (ref 0–1.3)
MONOCYTES RELATIVE PERCENT: 7.8 %
NEUTROPHILS ABSOLUTE: 5 K/UL (ref 1.7–7.7)
NEUTROPHILS RELATIVE PERCENT: 86.4 %
NITRITE, URINE: POSITIVE
PDW BLD-RTO: 15.1 % (ref 12.4–15.4)
PH UA: 6 (ref 5–8)
PLATELET # BLD: 271 K/UL (ref 135–450)
PMV BLD AUTO: 7.5 FL (ref 5–10.5)
POTASSIUM REFLEX MAGNESIUM: 3.8 MMOL/L (ref 3.5–5.1)
PROTEIN UA: NEGATIVE MG/DL
RBC # BLD: 4.68 M/UL (ref 4–5.2)
RBC UA: ABNORMAL /HPF (ref 0–4)
SARS-COV-2 RNA, RT PCR: DETECTED
SODIUM BLD-SCNC: 138 MMOL/L (ref 136–145)
SPECIFIC GRAVITY UA: <=1.005 (ref 1–1.03)
TOTAL PROTEIN: 8.4 G/DL (ref 6.4–8.2)
URINE TYPE: ABNORMAL
UROBILINOGEN, URINE: 0.2 E.U./DL
WBC # BLD: 5.8 K/UL (ref 4–11)
WBC UA: ABNORMAL /HPF (ref 0–5)

## 2022-08-11 PROCEDURE — 6360000002 HC RX W HCPCS: Performed by: STUDENT IN AN ORGANIZED HEALTH CARE EDUCATION/TRAINING PROGRAM

## 2022-08-11 PROCEDURE — 83690 ASSAY OF LIPASE: CPT

## 2022-08-11 PROCEDURE — 99285 EMERGENCY DEPT VISIT HI MDM: CPT

## 2022-08-11 PROCEDURE — 72131 CT LUMBAR SPINE W/O DYE: CPT

## 2022-08-11 PROCEDURE — 96361 HYDRATE IV INFUSION ADD-ON: CPT

## 2022-08-11 PROCEDURE — 84703 CHORIONIC GONADOTROPIN ASSAY: CPT

## 2022-08-11 PROCEDURE — 96375 TX/PRO/DX INJ NEW DRUG ADDON: CPT

## 2022-08-11 PROCEDURE — 6360000004 HC RX CONTRAST MEDICATION: Performed by: STUDENT IN AN ORGANIZED HEALTH CARE EDUCATION/TRAINING PROGRAM

## 2022-08-11 PROCEDURE — 36415 COLL VENOUS BLD VENIPUNCTURE: CPT

## 2022-08-11 PROCEDURE — 2580000003 HC RX 258: Performed by: STUDENT IN AN ORGANIZED HEALTH CARE EDUCATION/TRAINING PROGRAM

## 2022-08-11 PROCEDURE — 96374 THER/PROPH/DIAG INJ IV PUSH: CPT

## 2022-08-11 PROCEDURE — 87636 SARSCOV2 & INF A&B AMP PRB: CPT

## 2022-08-11 PROCEDURE — 74177 CT ABD & PELVIS W/CONTRAST: CPT

## 2022-08-11 PROCEDURE — 85025 COMPLETE CBC W/AUTO DIFF WBC: CPT

## 2022-08-11 PROCEDURE — 81001 URINALYSIS AUTO W/SCOPE: CPT

## 2022-08-11 PROCEDURE — 80053 COMPREHEN METABOLIC PANEL: CPT

## 2022-08-11 RX ORDER — IBUPROFEN 600 MG/1
600 TABLET ORAL 4 TIMES DAILY PRN
Qty: 360 TABLET | Refills: 1 | Status: SHIPPED | OUTPATIENT
Start: 2022-08-11

## 2022-08-11 RX ORDER — MORPHINE SULFATE 4 MG/ML
4 INJECTION, SOLUTION INTRAMUSCULAR; INTRAVENOUS ONCE
Status: COMPLETED | OUTPATIENT
Start: 2022-08-11 | End: 2022-08-11

## 2022-08-11 RX ORDER — 0.9 % SODIUM CHLORIDE 0.9 %
1000 INTRAVENOUS SOLUTION INTRAVENOUS ONCE
Status: COMPLETED | OUTPATIENT
Start: 2022-08-11 | End: 2022-08-11

## 2022-08-11 RX ORDER — ACETAMINOPHEN 325 MG/1
650 TABLET ORAL EVERY 6 HOURS PRN
Qty: 120 TABLET | Refills: 3 | Status: SHIPPED | OUTPATIENT
Start: 2022-08-11

## 2022-08-11 RX ORDER — FENTANYL CITRATE 50 UG/ML
50 INJECTION, SOLUTION INTRAMUSCULAR; INTRAVENOUS ONCE
Status: COMPLETED | OUTPATIENT
Start: 2022-08-11 | End: 2022-08-11

## 2022-08-11 RX ORDER — KETOROLAC TROMETHAMINE 30 MG/ML
15 INJECTION, SOLUTION INTRAMUSCULAR; INTRAVENOUS ONCE
Status: COMPLETED | OUTPATIENT
Start: 2022-08-11 | End: 2022-08-11

## 2022-08-11 RX ORDER — METHOCARBAMOL 500 MG/1
500 TABLET, FILM COATED ORAL 4 TIMES DAILY
Qty: 40 TABLET | Refills: 0 | Status: SHIPPED | OUTPATIENT
Start: 2022-08-11 | End: 2022-08-21

## 2022-08-11 RX ORDER — ONDANSETRON 2 MG/ML
4 INJECTION INTRAMUSCULAR; INTRAVENOUS EVERY 6 HOURS PRN
Status: DISCONTINUED | OUTPATIENT
Start: 2022-08-11 | End: 2022-08-11 | Stop reason: HOSPADM

## 2022-08-11 RX ADMIN — MORPHINE SULFATE 4 MG: 4 INJECTION, SOLUTION INTRAMUSCULAR; INTRAVENOUS at 11:15

## 2022-08-11 RX ADMIN — SODIUM CHLORIDE 1000 ML: 9 INJECTION, SOLUTION INTRAVENOUS at 11:15

## 2022-08-11 RX ADMIN — FENTANYL CITRATE 50 MCG: 50 INJECTION, SOLUTION INTRAMUSCULAR; INTRAVENOUS at 09:46

## 2022-08-11 RX ADMIN — SODIUM CHLORIDE 1000 ML: 9 INJECTION, SOLUTION INTRAVENOUS at 09:09

## 2022-08-11 RX ADMIN — ONDANSETRON HYDROCHLORIDE 4 MG: 2 INJECTION, SOLUTION INTRAMUSCULAR; INTRAVENOUS at 09:46

## 2022-08-11 RX ADMIN — IOPAMIDOL 75 ML: 755 INJECTION, SOLUTION INTRAVENOUS at 10:02

## 2022-08-11 RX ADMIN — KETOROLAC TROMETHAMINE 15 MG: 30 INJECTION, SOLUTION INTRAMUSCULAR at 09:09

## 2022-08-11 ASSESSMENT — PAIN SCALES - GENERAL
PAINLEVEL_OUTOF10: 6
PAINLEVEL_OUTOF10: 7
PAINLEVEL_OUTOF10: 5
PAINLEVEL_OUTOF10: 6

## 2022-08-11 ASSESSMENT — PAIN - FUNCTIONAL ASSESSMENT: PAIN_FUNCTIONAL_ASSESSMENT: ACTIVITIES ARE NOT PREVENTED

## 2022-08-11 NOTE — ED PROVIDER NOTES
Emergency Department Encounter    Patient: Lalita Javier  MRN: [id number]  : 1985  Date of Evaluation: 2022  ED Provider:  Brandi Corrales MD    Triage Chief Complaint:   Abdominal Pain and Flank Pain (C/o flank pain that radiates around to abdomen since 3am last night. Reports episodes of emesis. )    [place]:  Lalita Javier is a 40 y.o. female presenting with back and bilateral flank pain mostly on the left. Patient states around 3 AM she was acutely woken up with acute left-sided flank pain and back pain. Patient states the pain is moderate severe, constant, stabbing, worse with palpation without relieving factors. States she has had 1-2 episodes of nonbilious nonbloody vomiting. States she also has not had a bowel movement in the past 4 days. Denies any blood or melena stools. Denies urinary symptoms include dysuria, increased frequency, urgency, hematuria. Denies vaginal bleeding or discharge or any pelvic pain. Motor or sensory changes in her lower extremity. Denies drug use. States she drinks occasionally but is not a daily drinker. States she does have a history of diverticulitis and kidney stones in the past.  Denies recent falls or trauma. States she did have a fever 102 this morning. Denies any chest pain, shortness of breath, cough or sputum production. Denies headache, blurred vision, focal neurodeficits or motor or sensory changes.     ROS - see HPI, below listed is current ROS at time of my eval:  At least 14 systems reviewed, negative other HPI    Past Medical History:   Diagnosis Date    Allergic rhinitis     Ankle fracture, right     Anxiety     Anxiety     Cardiac arrhythmia due to congenital heart disease     COPD (chronic obstructive pulmonary disease) (HCC)     Depression     Diverticulitis     Dizziness     Endometriosis     GERD (gastroesophageal reflux disease)     Kidney stone     OCD (obsessive compulsive disorder)     Ovarian cyst     Recurrent upper respiratory infection (URI)     Sinus arrhythmia      Past Surgical History:   Procedure Laterality Date     SECTION      FRACTURE SURGERY      right ankle    KNEE SURGERY Left 2021    LEFT KNEE VIDEO ARTHROSCOPY, ANTERIOR CRUCIATE LIGAMENT RECONSTRUCTION WITH BONE TO BONE AUTOGRAFT performed by Edil Nguyen MD at [place] [address]  10/16/2018    excision lower abdominal mass    MD OFFICE/OUTPT VISIT,PROCEDURE ONLY N/A 10/16/2018    EXCISION ABDOMINAL WALL MASS performed by Leeanne Martinez MD at [facility] OR     Family History   Problem Relation Age of Onset    Arthritis Mother     High Blood Pressure Mother     Heart Disease Father     Heart Disease Maternal Grandmother     Breast Cancer Maternal Grandmother 48    Breast Cancer Maternal Aunt     Breast Cancer Paternal Aunt 36    Breast Cancer Paternal Aunt 40     Social History     Socioeconomic History    Marital status:      Spouse name: Not on file    Number of children: Not on file    Years of education: Not on file    Highest education level: Not on file   Occupational History    Not on file   Tobacco Use    Smoking status: Former     Packs/day: 0.25     Years: 9.00     Pack years: 2.25     Types: Cigarettes, Pipe     Quit date: 2017     Years since quittin.5    Smokeless tobacco: Never   Vaping Use    Vaping Use: Never used   Substance and Sexual Activity    Alcohol use: Yes     Comment: occasional     Drug use: No    Sexual activity: Yes     Partners: Male   Other Topics Concern    Not on file   Social History Narrative    Not on file     Social Determinants of Health     Financial Resource Strain: Not on file   Food Insecurity: Not on file   Transportation Needs: Not on file   Physical Activity: Not on file   Stress: Not on file   Social Connections: Not on file   Intimate Partner Violence: Not on file   Housing Stability: Not on file     No current facility-administered medications for this encounter. Current Outpatient Medications   Medication Sig Dispense Refill    ondansetron (ZOFRAN) 4 MG tablet Take by mouth (Patient not taking: Reported on 8/6/2022)      sertraline (ZOLOFT) 100 MG tablet Take 1 tablet by mouth      meloxicam (MOBIC) 7.5 MG tablet Take 1 tablet by mouth daily (Patient not taking: Reported on 8/6/2022) 90 tablet 1    pantoprazole (PROTONIX) 40 MG tablet       clonazePAM (KLONOPIN) 0.5 MG tablet Take 1 tablet by mouth every 6 hours as needed for Anxiety (Patient taking differently: Take 1 mg by mouth every 6 hours as needed for Anxiety.) 1 tablet 0     Allergies   Allergen Reactions    Bactrim Ds [Sulfamethoxazole-Trimethoprim] Anaphylaxis    Duloxetine Anaphylaxis    Hydromorphone Hcl      Other reaction(s): (moderate to severe)    Ethinyl Estradiol     Norgestimate     Other      States she is allergic to orthotrycycline    Sulfa Antibiotics     Trimethoprim      Other reaction(s): Trouble Breathing       Nursing Notes Reviewed    Physical Exam:  Triage VS:    ED Triage Vitals [08/11/22 0751]   Enc Vitals Group      /63      Heart Rate (!) 113      Resp 18      Temp 98.9 °F (37.2 °C)      Temp src       SpO2 99 %      Weight 128 lb (58.1 kg)      Height 5' 2\" (1.575 m)      Head Circumference       Peak Flow       Pain Score       Pain Loc       Pain Edu? Excl. in [address]? My pulse ox interpretation is - normal    General appearance:  No acute distress. Skin:  Warm. Dry. Eye:  Extraocular movements intact. Ears, nose, mouth and throat:  Oral mucosa moist   Neck:  Trachea midline. Extremity:  No swelling. Normal ROM     Heart:  Regular rate and rhythm, normal S1 & S2, no extra heart sounds. Perfusion:  intact  Respiratory:  Lungs clear to auscultation bilaterally. Respirations nonlabored. Abdominal:  Normal bowel sounds. Soft.   Tenderness palpation of the left flank and left lower quadrant with voluntary guarding without rebound, no CVA tender  Back:  No CVA tenderness to palpation   more paraspinous tenderness palpation along the L-spine, mild discomfort along the central L-spine. Significant tenderness palpation along the left flank into the left lower quadrant, no tenderness palpation of the pelvis, patient has 5 out of 5 motor strength in the bilateral lower extremities with normal sensation light touch, no saddle anesthesia  Neurological:  Alert and oriented times 3. No focal neuro deficits. Psychiatric:  Appropriate    I have reviewed and interpreted all of the currently available lab results from this visit (if applicable):  No results found for this visit on 08/11/22. Radiographs (if obtained):  Radiologist's Report Reviewed:  No results found. MDM:    32-year-old female presenting with back and flank pain with fever this morning and 2 episodes of nonbilious nonbloody vomiting. History missing above. Patient is tachycardic to 113 on presentation but otherwise vitals are reassuring and patient afebrile satting on room air. Physical exam can be seen above. CBC is reassuring without leukocytosis. Hemoglobin is within normal limits. CMP is overall reassuring. Lipase not elevated. Pregnancy testing is negative. UA not consistent with infection. Rapid COVID is positive. CT abdomen pelvis shows no acute abdominal pelvic injury. There is bilateral nonobstructive nephrolithiasis there is also 2.4 cm left follicular cyst with simple features, no follow-up recommended. Patient having no tenderness palpation in the pelvic region. CT L-spine shows no acute fractures or subluxation. There is stable L4-L5 small central disc protrusion and L5-S1 posterior disc protrusion with no neural impingement appreciated. Patient has normal sensation light touch of the bilateral lower extremities with 5 out of 5 motor strength in all muscle groups in the bilateral lower extremities with no saddle anesthesia.   Patient's tenderness palpation is more muscular on the back and less centrally. After pain control and fluids in the ED patient's tachycardia has improved and heart rate is 74. Patient is overall very well-appearing and states her pain is significantly improved. I discussed the [place] is likely causing her musculoskeletal pain. Patient has no significant red flags but in discussion with patient I did offer MRI of the spine although I do believe osteomyelitis or epidural abscess is very unlikely in this patient and given COVID status and patient's improvement with pain meds and significant provement after hydration analgesia in the ED with no red flags. In shared decision-making will defer MRI at this time and patient promises return for any red flags or worsening pain or changes in her clinical status. I discussed close follow-up with primary care physician as well as strict return precautions and patient agreeable to plan. Patient feels safe for discharge and patient discharged home. Clinical Impression:  1. COVID-19    2. Musculoskeletal back pain            Comment: Please note this report has been produced using speech recognition software and may contain errors related to that system including errors in grammar, punctuation, and spelling, as well as words and phrases that may be inappropriate. Efforts were made to edit the dictations.         Brandi Corrales MD  08/11/22 2023

## 2022-10-21 ENCOUNTER — HOSPITAL ENCOUNTER (EMERGENCY)
Age: 37
Discharge: HOME | End: 2022-10-21
Attending: STUDENT IN AN ORGANIZED HEALTH CARE EDUCATION/TRAINING PROGRAM
Payer: MEDICAID

## 2022-10-21 VITALS
HEART RATE: 91 BPM | BODY MASS INDEX: 22.26 KG/M2 | OXYGEN SATURATION: 100 % | WEIGHT: 121 LBS | DIASTOLIC BLOOD PRESSURE: 64 MMHG | SYSTOLIC BLOOD PRESSURE: 97 MMHG | TEMPERATURE: 98.3 F | RESPIRATION RATE: 16 BRPM | HEIGHT: 62 IN

## 2022-10-21 DIAGNOSIS — J06.9 VIRAL URI WITH COUGH: Primary | ICD-10-CM

## 2022-10-21 PROCEDURE — 87635 SARS-COV-2 COVID-19 AMP PRB: CPT

## 2022-10-21 PROCEDURE — 87804 INFLUENZA ASSAY W/OPTIC: CPT

## 2022-10-21 PROCEDURE — 87880 STREP A ASSAY W/OPTIC: CPT

## 2022-10-21 PROCEDURE — 99283 EMERGENCY DEPT VISIT LOW MDM: CPT

## 2022-10-21 PROCEDURE — 6360000002 HC RX W HCPCS: Performed by: STUDENT IN AN ORGANIZED HEALTH CARE EDUCATION/TRAINING PROGRAM

## 2022-10-21 PROCEDURE — 87081 CULTURE SCREEN ONLY: CPT

## 2022-10-21 RX ORDER — DEXAMETHASONE SODIUM PHOSPHATE 10 MG/ML
10 INJECTION, SOLUTION INTRAMUSCULAR; INTRAVENOUS ONCE
Status: COMPLETED | OUTPATIENT
Start: 2022-10-21 | End: 2022-10-21

## 2022-10-21 RX ADMIN — DEXAMETHASONE SODIUM PHOSPHATE 10 MG: 10 INJECTION, SOLUTION INTRAMUSCULAR; INTRAVENOUS at 08:26

## 2022-10-21 ASSESSMENT — PAIN - FUNCTIONAL ASSESSMENT: PAIN_FUNCTIONAL_ASSESSMENT: NONE - DENIES PAIN

## 2022-10-21 NOTE — ED PROVIDER NOTES
[facility] EMERGENCY DEPARTMENT      CHIEF COMPLAINT  Fever (C/O fever, body aches x 3 days)     HISTORY OF PRESENT ILLNESS  Shan Martinez is a 40 y.o. female  who presents to the ED complaining of generalized body aches, fevers, cough productive of yellow sputum and sore throat. Patient states that symptoms have been ongoing for the past 3 days. She has been taking over-the-counter Mucinex and antipyretics with improvement of her symptoms. She denies any shortness of breath or chest pain. Denies abdominal pain, nausea vomiting or diarrhea. Denies urinary symptoms. Denies any other complaints or concerns. No other complaints, modifying factors or associated symptoms. I have reviewed the following from the nursing documentation.     Past Medical History:   Diagnosis Date    Allergic rhinitis     Ankle fracture, right     Anxiety     Anxiety     Cardiac arrhythmia due to congenital heart disease     COPD (chronic obstructive pulmonary disease) (HCC)     Depression     Diverticulitis     Dizziness     Endometriosis     GERD (gastroesophageal reflux disease)     Kidney stone     OCD (obsessive compulsive disorder)     Ovarian cyst     Recurrent upper respiratory infection (URI)     Sinus arrhythmia      Past Surgical History:   Procedure Laterality Date     SECTION      FRACTURE SURGERY      right ankle    KNEE SURGERY Left 2021    LEFT KNEE VIDEO ARTHROSCOPY, ANTERIOR CRUCIATE LIGAMENT RECONSTRUCTION WITH BONE TO BONE AUTOGRAFT performed by Mere Jalloh MD at [place] [address]  10/16/2018    excision lower abdominal mass    UT OFFICE/OUTPT VISIT,PROCEDURE ONLY N/A 10/16/2018    EXCISION ABDOMINAL WALL MASS performed by Reji Villatoro MD at [facility] OR     Family History   Problem Relation Age of Onset    Arthritis Mother     High Blood Pressure Mother     Heart Disease Father     Heart Disease Maternal Grandmother     Breast Cancer Maternal Grandmother 47    Breast Cancer Maternal Aunt     Breast Cancer Paternal Aunt 36    Breast Cancer Paternal Aunt 40     Social History     Socioeconomic History    Marital status:      Spouse name: Not on file    Number of children: Not on file    Years of education: Not on file    Highest education level: Not on file   Occupational History    Not on file   Tobacco Use    Smoking status: Former     Packs/day: 0.25     Years: 9.00     Pack years: 2.25     Types: Cigarettes, Pipe     Quit date: 2017     Years since quittin.7    Smokeless tobacco: Never   Vaping Use    Vaping Use: Never used   Substance and Sexual Activity    Alcohol use: Yes     Comment: occasional     Drug use: No    Sexual activity: Yes     Partners: Male   Other Topics Concern    Not on file   Social History Narrative    Not on file     Social Determinants of Health     Financial Resource Strain: Not on file   Food Insecurity: Not on file   Transportation Needs: Not on file   Physical Activity: Not on file   Stress: Not on file   Social Connections: Not on file   Intimate Partner Violence: Not on file   Housing Stability: Not on file     No current facility-administered medications for this encounter.      Current Outpatient Medications   Medication Sig Dispense Refill    ibuprofen (ADVIL;MOTRIN) 600 MG tablet Take 1 tablet by mouth 4 times daily as needed for Pain 360 tablet 1    acetaminophen (AMINOFEN) 325 MG tablet Take 2 tablets by mouth every 6 hours as needed for Pain 120 tablet 3    sertraline (ZOLOFT) 100 MG tablet Take 1 tablet by mouth      pantoprazole (PROTONIX) 40 MG tablet       clonazePAM (KLONOPIN) 0.5 MG tablet Take 1 tablet by mouth every 6 hours as needed for Anxiety (Patient taking differently: Take 1 mg by mouth every 6 hours as needed for Anxiety.) 1 tablet 0     Allergies   Allergen Reactions    Bactrim Ds [Sulfamethoxazole-Trimethoprim] Anaphylaxis    Duloxetine Anaphylaxis    Hydromorphone Hcl      Other reaction(s): (moderate to severe)    Ethinyl Estradiol     Norgestimate     Other      States she is allergic to orthotrycycline    Sulfa Antibiotics     Trimethoprim      Other reaction(s): Trouble Breathing       REVIEW OF SYSTEMS  10 systems reviewed, pertinent positives per HPI otherwise noted to be negative. PHYSICAL EXAM  BP 97/64   Pulse 91   Temp 98.3 °F (36.8 °C) (Oral)   Resp 16   Ht 5' 2\" (1.575 m)   Wt 121 lb (54.9 kg)   LMP 10/01/2022   SpO2 100%   BMI 22.13 kg/m²    GENERAL APPEARANCE: Awake and alert. Cooperative. No acute distress. HENT: Normocephalic. Atraumatic. Mucous membranes are moist.  Bilateral tonsillar erythema, no exudates. No uvular deviation. No submandibular swelling. No difficulty speaking or tolerating secretions. No significant cervical lymphadenopathy. NECK: Supple. EYES: PERRL. EOM's grossly intact. HEART/CHEST: RRR. No murmurs. LUNGS: Respirations unlabored. CTAB. Good air exchange. Speaking comfortably in full sentences. ABDOMEN: No tenderness. Soft. Non-distended. No masses. No organomegaly. No guarding or rebound. MUSCULOSKELETAL: No extremity edema. Compartments soft. No deformity. No tenderness in the extremities. All extremities neurovascularly intact. SKIN: Warm and dry. No acute rashes. NEUROLOGICAL: Alert and oriented. CN's 2-12 intact. No gross facial drooping. Strength 5/5, sensation intact. Gait normal.  PSYCHIATRIC: Normal mood and affect. LABS  I have reviewed all labs for this visit.    Results for orders placed or performed during the hospital encounter of 10/21/22   Strep screen group a throat    Specimen: Throat   Result Value Ref Range    Rapid Strep A Screen Negative Negative   Rapid influenza A/B antigens    Specimen: Nasopharyngeal   Result Value Ref Range    Rapid Influenza A Ag Negative Negative    Rapid Influenza B Ag Negative Negative   COVID-19, Rapid    Specimen: Nasopharyngeal Swab   Result Value Ref Range    SARS-CoV-2, NAAT Not Detected Not Detected     RADIOLOGY  No orders to display     ED COURSE/MDM  Patient seen and evaluated. Old records reviewed. Labs and imaging reviewed and results discussed with patient. Patient is a 29-year-old female, presenting with 3-day history of fevers, body aches, sore throat and cough. Full HPI as detailed above. Upon arrival in the ED, vitals reassuring. Patient is resting comfortably and is in no acute distress. She is afebrile here in the department appears nontoxic. She does have bilateral tonsillar erythema, no uvular deviation. Lungs are clear to auscultation bilaterally. Strep, flu, and COVID swabs were obtained and were negative. Feel that symptoms are likely secondary to viral URI/pharyngitis. Do not feel that antibiotics are indicated at this point in time. Patient was advised to continue over-the-counter medications. Advise follow-up with PCP if symptoms or not improving. She is given return precautions for the ED. She is comfortable in agreement with plan of care and was discharged. I, Dr. Severa Fort, MD, am the primary clinician of record. Is this patient to be included in the SEP-1 Core Measure? No   Exclusion criteria - the patient is NOT to be included for SEP-1 Core Measure due to:  2+ SIRS criteria are not met     During the patient's ED course, the patient was given:  Medications   dexamethasone (PF) (DECADRON) injection 10 mg (10 mg Oral Given 10/21/22 0826)        CLINICAL IMPRESSION  1. Viral URI with cough        Blood pressure 97/64, pulse 91, temperature 98.3 °F (36.8 °C), temperature source Oral, resp. rate 16, height 5' 2\" (1.575 m), weight 121 lb (54.9 kg), last menstrual period 10/01/2022, SpO2 100 %, not currently breastfeeding. DISPOSITION  Shan Leonard was discharged to home in good condition. Patient was given scripts for the following medications. I counseled patient how to take these medications.    Discharge Medication List as of 10/21/2022 8:28 AM          Follow-up with:  Robbie Bedoya, APRN - CNP  [address]  [address] [facility]  [phone]    Schedule an appointment as soon as possible for a visit in 1 week      [facility] Emergency Department  [address] [place] [address],[address]  [phone]  Go to   If symptoms worsen    DISCLAIMER: This chart was created using Dragon dictation software. Efforts were made by me to ensure accuracy, however some errors may be present due to limitations of this technology and occasionally words are not transcribed correctly.        Pavel Dumont MD  10/21/22 9581

## 2022-10-21 NOTE — ED NOTES
Reviewed patient discharge instructions at this time, copy given to patient. No questions or concerns. Patient voiced understanding.         Soto Maya RN  10/21/22 4034

## 2022-10-23 LAB — S PYO THROAT QL CULT: NORMAL

## 2022-11-22 ENCOUNTER — APPOINTMENT (OUTPATIENT)
Dept: GENERAL RADIOLOGY | Age: 37
End: 2022-11-22
Payer: MEDICAID

## 2022-11-22 ENCOUNTER — HOSPITAL ENCOUNTER (EMERGENCY)
Age: 37
Discharge: HOME | End: 2022-11-22
Attending: EMERGENCY MEDICINE
Payer: MEDICAID

## 2022-11-22 VITALS
HEIGHT: 62 IN | TEMPERATURE: 97.9 F | WEIGHT: 127 LBS | HEART RATE: 88 BPM | SYSTOLIC BLOOD PRESSURE: 108 MMHG | BODY MASS INDEX: 23.37 KG/M2 | DIASTOLIC BLOOD PRESSURE: 63 MMHG | RESPIRATION RATE: 16 BRPM | OXYGEN SATURATION: 100 %

## 2022-11-22 DIAGNOSIS — R19.7 NAUSEA VOMITING AND DIARRHEA: ICD-10-CM

## 2022-11-22 DIAGNOSIS — J06.9 UPPER RESPIRATORY TRACT INFECTION, UNSPECIFIED TYPE: Primary | ICD-10-CM

## 2022-11-22 DIAGNOSIS — R11.2 NAUSEA VOMITING AND DIARRHEA: ICD-10-CM

## 2022-11-22 LAB
INFLUENZA A: NOT DETECTED
INFLUENZA B: NOT DETECTED
SARS-COV-2 RNA, RT PCR: NOT DETECTED

## 2022-11-22 PROCEDURE — 99284 EMERGENCY DEPT VISIT MOD MDM: CPT

## 2022-11-22 PROCEDURE — 71045 X-RAY EXAM CHEST 1 VIEW: CPT

## 2022-11-22 PROCEDURE — 87636 SARSCOV2 & INF A&B AMP PRB: CPT

## 2022-11-22 ASSESSMENT — PAIN DESCRIPTION - DESCRIPTORS: DESCRIPTORS: ACHING

## 2022-11-22 ASSESSMENT — PAIN DESCRIPTION - LOCATION: LOCATION: GENERALIZED

## 2022-11-22 ASSESSMENT — PAIN - FUNCTIONAL ASSESSMENT: PAIN_FUNCTIONAL_ASSESSMENT: 0-10

## 2022-11-22 ASSESSMENT — PAIN DESCRIPTION - FREQUENCY: FREQUENCY: CONTINUOUS

## 2022-11-22 ASSESSMENT — PAIN SCALES - GENERAL: PAINLEVEL_OUTOF10: 6

## 2022-11-22 NOTE — Clinical Note
Florencia Lunsford was seen and treated in our emergency department on 11/22/2022. She may return to work on 11/24/2022. If you have any questions or concerns, please don't hesitate to call.       Nita Huff MD

## 2022-11-22 NOTE — ED PROVIDER NOTES
[facility] ED      CHIEF COMPLAINT  Influenza (Pt feels sick x a few days. Cough mucus and body aches. )       HISTORY OF PRESENT ILLNESS  Shan Mendoza is a 40 y.o. female  who presents to the ED complaining of feeling ill for the past several days. Patient states that she has generalized malaise, achiness and fatigue. She has had a cough with significant drainage. She states that ever since she had COVID in August it seems like her allergies and drainage have been worse than usual.  She has a burning feeling in her chest with sharp pain in her upper back. She has been having cough that initially was productive of green sputum that is now more dark. She is unsure if she has had any fevers but has been taking Tylenol regularly so is unsure that it suppressed it. She is also taking Mucinex. She did take a Phenergan yesterday as she has had 3 episodes of nonbloody nonbilious emesis yesterday that did seem to help. She is feeling nauseous currently but no current vomiting. She has had decreased p.o. intake but has been trying to drink water. She has had some loose diarrhea type stools. No melena or hematochezia. No other complaints, modifying factors or associated symptoms. I have reviewed the following from the nursing documentation.     Past Medical History:   Diagnosis Date    Allergic rhinitis     Ankle fracture, right     Anxiety     Anxiety     Cardiac arrhythmia due to congenital heart disease     COPD (chronic obstructive pulmonary disease) (HCC)     Depression     Diverticulitis     Dizziness     Endometriosis     GERD (gastroesophageal reflux disease)     Kidney stone     OCD (obsessive compulsive disorder)     Ovarian cyst     Recurrent upper respiratory infection (URI)     Sinus arrhythmia      Past Surgical History:   Procedure Laterality Date     SECTION      FRACTURE SURGERY      right ankle    KNEE SURGERY Left 2021    LEFT KNEE VIDEO ARTHROSCOPY, ANTERIOR CRUCIATE LIGAMENT RECONSTRUCTION WITH BONE TO BONE AUTOGRAFT performed by Syed Hussein MD at [facility]  10/16/2018    excision lower abdominal mass    [place] OFFICE/OUTPT VISIT,PROCEDURE ONLY N/A 10/16/2018    EXCISION ABDOMINAL WALL MASS performed by Levi Vicente MD at [facility] OR     Family History   Problem Relation Age of Onset    Arthritis Mother     High Blood Pressure Mother     Heart Disease Father     Heart Disease Maternal Grandmother     Breast Cancer Maternal Grandmother 48    Breast Cancer Maternal Aunt     Breast Cancer Paternal Aunt 36    Breast Cancer Paternal Aunt 40     Social History     Socioeconomic History    Marital status:      Spouse name: Not on file    Number of children: Not on file    Years of education: Not on file    Highest education level: Not on file   Occupational History    Not on file   Tobacco Use    Smoking status: Former     Packs/day: 0.25     Years: 9.00     Pack years: 2.25     Types: Cigarettes, Pipe     Quit date: 2017     Years since quittin.8    Smokeless tobacco: Never   Vaping Use    Vaping Use: Never used   Substance and Sexual Activity    Alcohol use: Yes     Comment: occasional     Drug use: No    Sexual activity: Yes     Partners: Male   Other Topics Concern    Not on file   Social History Narrative    Not on file     Social Determinants of Health     Financial Resource Strain: Not on file   Food Insecurity: Not on file   Transportation Needs: Not on file   Physical Activity: Not on file   Stress: Not on file   Social Connections: Not on file   Intimate Partner Violence: Not on file   Housing Stability: Not on file     No current facility-administered medications for this encounter.      Current Outpatient Medications   Medication Sig Dispense Refill    ibuprofen (ADVIL;MOTRIN) 600 MG tablet Take 1 tablet by mouth 4 times daily as needed for Pain 360 tablet 1    acetaminophen (AMINOFEN) 325 MG tablet Take 2 tablets by mouth every 6 hours as needed for Pain 120 tablet 3    sertraline (ZOLOFT) 100 MG tablet Take 1 tablet by mouth      pantoprazole (PROTONIX) 40 MG tablet       clonazePAM (KLONOPIN) 0.5 MG tablet Take 1 tablet by mouth every 6 hours as needed for Anxiety (Patient taking differently: Take 1 mg by mouth every 6 hours as needed for Anxiety.) 1 tablet 0     Allergies   Allergen Reactions    Bactrim Ds [Sulfamethoxazole-Trimethoprim] Anaphylaxis    Duloxetine Anaphylaxis    Hydromorphone Hcl      Other reaction(s): (moderate to severe)    Ethinyl Estradiol     Norgestimate     Other      States she is allergic to orthotrycycline    Sulfa Antibiotics     Trimethoprim      Other reaction(s): Trouble Breathing       REVIEW OF SYSTEMS  10 systems reviewed, pertinent positives per HPI otherwise noted to be negative. PHYSICAL EXAM  /63   Pulse 88   Temp 97.9 °F (36.6 °C) (Oral)   Resp 16   Ht 5' 2\" (1.575 m)   Wt 127 lb (57.6 kg)   SpO2 100%   BMI 23.23 kg/m²    GENERAL APPEARANCE: Awake and alert. Cooperative. No acute distress. HENT: Normocephalic. Atraumatic. Mucous membranes are moist.  No drooling or stridor. Mild posterior oropharyngeal erythema without exudate. Uvula midline and nonedematous. NECK: Supple. No nuchal rigidity. EYES: PERRL. EOM's grossly intact. HEART/CHEST: RRR. No murmurs. 2+ radial pulses noted. LUNGS: Respirations unlabored. CTAB. Good air exchange. Speaking comfortably in full sentences. ABDOMEN: No tenderness. Soft. Non-distended. No masses. No organomegaly. No guarding or rebound. MUSCULOSKELETAL: No extremity edema. Compartments soft. No deformity. No tenderness in the extremities. All extremities neurovascularly intact. SKIN: Warm and dry. No acute rashes. NEUROLOGICAL: Alert and oriented. CN's 2-12 intact. No gross facial drooping. No gross focal deficits  PSYCHIATRIC: Normal mood and affect. LABS  I have reviewed all labs for this visit. Results for orders placed or performed during the hospital encounter of 11/22/22   COVID-19 & Influenza Combo    Specimen: Nasopharyngeal Swab   Result Value Ref Range    SARS-CoV-2 RNA, RT PCR NOT DETECTED NOT DETECTED    INFLUENZA A NOT DETECTED NOT DETECTED    INFLUENZA B NOT DETECTED NOT DETECTED       RADIOLOGY  XR CHEST PORTABLE    Result Date: 11/22/2022  EXAMINATION: ONE XRAY VIEW OF THE CHEST 11/22/2022 7:38 am COMPARISON: 08/06/2022 HISTORY: ORDERING SYSTEM PROVIDED HISTORY: cough TECHNOLOGIST PROVIDED HISTORY: Reason for exam:->cough Reason for Exam: cough, influenza. FINDINGS: Heart size is normal.  Mediastinal contours are normal.  Pulmonary vascularity is normal.  No focal lung consolidation noted     No acute disease        ED COURSE/MDM  Patient seen and evaluated. Old records reviewed. Labs and imaging reviewed and results discussed with patient. Patient presenting for evaluation of cough and malaise and symptoms consistent with likely viral type illness and URI. Rapid flu and COVID both negative. She has been having emesis but does have antibiotics at home and does not have any current ongoing emesis and her abdominal examination is benign. Encourage continued oral hydration. I did obtain a chest x-ray to evaluate for any possible developing pneumonia and this was clear without any confluent infiltrate. She does not have any hypoxia or increased work of breathing. At this time I feel that is reasonable to discharge patient home with continuation of over-the-counter supportive treatment such as with Mucinex and Tylenol, and close outpatient follow-up. She was very much in agreement with that plan. All questions were answered at time of discharge. I, Dr. Cynthia Topete MD, am the primary clinician of record. Is this patient to be included in the SEP-1 Core Measure?   No   Exclusion criteria - the patient is NOT to be included for SEP-1 Core Measure due to:  Viral etiology found or highly suspected (including COVID-19) without concomitant bacterial infection     During the patient's ED course, the patient was given:  Medications - No data to display     CLINICAL IMPRESSION  1. Upper respiratory tract infection, unspecified type    2. Nausea vomiting and diarrhea        Blood pressure 108/63, pulse 88, temperature 97.9 °F (36.6 °C), temperature source Oral, resp. rate 16, height 5' 2\" (1.575 m), weight 127 lb (57.6 kg), SpO2 100 %, not currently breastfeeding. DISPOSITION  Shan Leonard was discharged to home in stable condition. Patient was given scripts for the following medications. I counseled patient how to take these medications. New Prescriptions    No medications on file       Follow-up with:  Asad Morales, APRN - CNP  [address]  [address] [facility]  [phone]    Schedule an appointment as soon as possible for a visit in 2 days  For recheck    DISCLAIMER: This chart was created using Dragon dictation software. Efforts were made by me to ensure accuracy, however some errors may be present due to limitations of this technology and occasionally words are not transcribed correctly.        Heidy Barfield MD  11/22/22 3365

## 2022-12-12 ENCOUNTER — APPOINTMENT (OUTPATIENT)
Dept: GENERAL RADIOLOGY | Age: 37
End: 2022-12-12
Payer: MEDICAID

## 2022-12-12 ENCOUNTER — APPOINTMENT (OUTPATIENT)
Dept: CT IMAGING | Age: 37
End: 2022-12-12
Payer: MEDICAID

## 2022-12-12 ENCOUNTER — HOSPITAL ENCOUNTER (EMERGENCY)
Age: 37
Discharge: HOME | End: 2022-12-12
Attending: STUDENT IN AN ORGANIZED HEALTH CARE EDUCATION/TRAINING PROGRAM
Payer: MEDICAID

## 2022-12-12 VITALS
DIASTOLIC BLOOD PRESSURE: 56 MMHG | WEIGHT: 120 LBS | HEIGHT: 62 IN | BODY MASS INDEX: 22.08 KG/M2 | TEMPERATURE: 97.5 F | SYSTOLIC BLOOD PRESSURE: 97 MMHG | RESPIRATION RATE: 16 BRPM | OXYGEN SATURATION: 97 % | HEART RATE: 72 BPM

## 2022-12-12 DIAGNOSIS — Z86.718 H/O DEEP VENOUS THROMBOSIS: ICD-10-CM

## 2022-12-12 DIAGNOSIS — Z53.29 LEFT AGAINST MEDICAL ADVICE: ICD-10-CM

## 2022-12-12 DIAGNOSIS — R07.9 CHEST PAIN, UNSPECIFIED TYPE: Primary | ICD-10-CM

## 2022-12-12 LAB
A/G RATIO: 1.8 (ref 1.1–2.2)
ALBUMIN SERPL-MCNC: 4.4 G/DL (ref 3.4–5)
ALP BLD-CCNC: 51 U/L (ref 40–129)
ALT SERPL-CCNC: 7 U/L (ref 10–40)
ANION GAP SERPL CALCULATED.3IONS-SCNC: 5 MMOL/L (ref 3–16)
AST SERPL-CCNC: 10 U/L (ref 15–37)
BASOPHILS ABSOLUTE: 0.1 K/UL (ref 0–0.2)
BASOPHILS RELATIVE PERCENT: 0.9 %
BILIRUB SERPL-MCNC: <0.2 MG/DL (ref 0–1)
BUN BLDV-MCNC: 18 MG/DL (ref 7–20)
CALCIUM SERPL-MCNC: 8.9 MG/DL (ref 8.3–10.6)
CHLORIDE BLD-SCNC: 105 MMOL/L (ref 99–110)
CO2: 26 MMOL/L (ref 21–32)
CREAT SERPL-MCNC: 0.6 MG/DL (ref 0.6–1.1)
D DIMER: <0.27 UG/ML FEU (ref 0–0.6)
EKG ATRIAL RATE: 82 BPM
EKG DIAGNOSIS: NORMAL
EKG P AXIS: 66 DEGREES
EKG P-R INTERVAL: 126 MS
EKG Q-T INTERVAL: 358 MS
EKG QRS DURATION: 82 MS
EKG QTC CALCULATION (BAZETT): 418 MS
EKG R AXIS: 56 DEGREES
EKG T AXIS: 33 DEGREES
EKG VENTRICULAR RATE: 82 BPM
EOSINOPHILS ABSOLUTE: 0.1 K/UL (ref 0–0.6)
EOSINOPHILS RELATIVE PERCENT: 1.2 %
GFR SERPL CREATININE-BSD FRML MDRD: >60 ML/MIN/{1.73_M2}
GLUCOSE BLD-MCNC: 84 MG/DL (ref 70–99)
HCG QUALITATIVE: NEGATIVE
HCT VFR BLD CALC: 38.2 % (ref 36–48)
HEMOGLOBIN: 12.6 G/DL (ref 12–16)
LYMPHOCYTES ABSOLUTE: 2.9 K/UL (ref 1–5.1)
LYMPHOCYTES RELATIVE PERCENT: 38.5 %
MCH RBC QN AUTO: 29.6 PG (ref 26–34)
MCHC RBC AUTO-ENTMCNC: 33.1 G/DL (ref 31–36)
MCV RBC AUTO: 89.2 FL (ref 80–100)
MONOCYTES ABSOLUTE: 0.5 K/UL (ref 0–1.3)
MONOCYTES RELATIVE PERCENT: 6 %
NEUTROPHILS ABSOLUTE: 4 K/UL (ref 1.7–7.7)
NEUTROPHILS RELATIVE PERCENT: 53.4 %
PDW BLD-RTO: 14.3 % (ref 12.4–15.4)
PLATELET # BLD: 272 K/UL (ref 135–450)
PMV BLD AUTO: 8.3 FL (ref 5–10.5)
POTASSIUM REFLEX MAGNESIUM: 4 MMOL/L (ref 3.5–5.1)
PRO-BNP: 39 PG/ML (ref 0–124)
RBC # BLD: 4.28 M/UL (ref 4–5.2)
SODIUM BLD-SCNC: 136 MMOL/L (ref 136–145)
TOTAL PROTEIN: 6.9 G/DL (ref 6.4–8.2)
TROPONIN: <0.01 NG/ML
WBC # BLD: 7.5 K/UL (ref 4–11)

## 2022-12-12 PROCEDURE — 99285 EMERGENCY DEPT VISIT HI MDM: CPT

## 2022-12-12 PROCEDURE — 80053 COMPREHEN METABOLIC PANEL: CPT

## 2022-12-12 PROCEDURE — 93010 ELECTROCARDIOGRAM REPORT: CPT | Performed by: INTERNAL MEDICINE

## 2022-12-12 PROCEDURE — 83880 ASSAY OF NATRIURETIC PEPTIDE: CPT

## 2022-12-12 PROCEDURE — 84703 CHORIONIC GONADOTROPIN ASSAY: CPT

## 2022-12-12 PROCEDURE — 85379 FIBRIN DEGRADATION QUANT: CPT

## 2022-12-12 PROCEDURE — 36415 COLL VENOUS BLD VENIPUNCTURE: CPT

## 2022-12-12 PROCEDURE — 71046 X-RAY EXAM CHEST 2 VIEWS: CPT

## 2022-12-12 PROCEDURE — 85025 COMPLETE CBC W/AUTO DIFF WBC: CPT

## 2022-12-12 PROCEDURE — 84484 ASSAY OF TROPONIN QUANT: CPT

## 2022-12-12 PROCEDURE — 93005 ELECTROCARDIOGRAM TRACING: CPT | Performed by: STUDENT IN AN ORGANIZED HEALTH CARE EDUCATION/TRAINING PROGRAM

## 2022-12-12 ASSESSMENT — ENCOUNTER SYMPTOMS
CHEST TIGHTNESS: 1
RHINORRHEA: 0
COLOR CHANGE: 0
SORE THROAT: 0
SHORTNESS OF BREATH: 1
ABDOMINAL PAIN: 0
FACIAL SWELLING: 0

## 2022-12-12 ASSESSMENT — PAIN - FUNCTIONAL ASSESSMENT
PAIN_FUNCTIONAL_ASSESSMENT: NONE - DENIES PAIN
PAIN_FUNCTIONAL_ASSESSMENT: 0-10

## 2022-12-12 ASSESSMENT — PAIN SCALES - GENERAL: PAINLEVEL_OUTOF10: 5

## 2022-12-12 ASSESSMENT — PAIN DESCRIPTION - LOCATION: LOCATION: CHEST

## 2022-12-12 NOTE — ED NOTES
Pt states that she is wanting to go home, Dr. Cuong Fu at the bedside to discuss AMA. Pt signed AMA form.      Arnie Abbott RN  12/12/22 4580

## 2022-12-12 NOTE — ED PROVIDER NOTES
[address] ED  EMERGENCY DEPARTMENT ENCOUNTER        Pt Name: Risa Thompson  MRN: [id number]  Armsjennigfharman 1985  Dateof evaluation: 12/12/2022  Provider: SHERI Block CNP  PCP: SHERI Mcgregor CNP  ED Attending: No att. providers found    [facility]       Chief Complaint   Patient presents with    Chest Pain     C/o chest pain, dizziness, irregular heartbeat 930a       HISTORY OF PRESENTILLNESS   (Location/Symptom, Timing/Onset, Context/Setting, Quality, Duration, Modifying Factors, Severity)  Note limiting factors. Risa Thompson is a 40 y.o. female for chest pain. Onset was today. Context includes patient reports that she developed chest pain, tightness, diaphoresis and dizziness. She also reports that she felt a run of PVCs. Patient follows with Dr. Yanique Muse from cardiology. Patient reports that she does have a history of DVT in the past.  She is currently not on any blood thinners and does not take aspirin. Alleviating factors include nothing. Aggravating factors include nothing. Pain is 5/10. Nothing has been used for pain today. Nursing Notes were all reviewed and agreed with or any disagreements were addressed  in the HPI. REVIEW OF SYSTEMS    (2-9 systems for level 4, 10 or more for level 5)     Review of Systems   Constitutional:  Positive for diaphoresis. Negative for activity change, appetite change and fever. HENT:  Negative for congestion, facial swelling, rhinorrhea and sore throat. Eyes:  Negative for visual disturbance. Respiratory:  Positive for chest tightness and shortness of breath. Cardiovascular:  Positive for chest pain. Gastrointestinal:  Negative for abdominal pain. Genitourinary:  Negative for difficulty urinating. Musculoskeletal:  Negative for arthralgias and myalgias. Skin:  Negative for color change and rash. Neurological:  Positive for dizziness. Negative for light-headedness. Psychiatric/Behavioral:  Negative for agitation. All other systems reviewed and are negative. Positives and Pertinent negatives as per HPI. Except as noted above in the ROS, all other systems were reviewed and negative.        PAST MEDICAL HISTORY     Past Medical History:   Diagnosis Date    Allergic rhinitis     Ankle fracture, right     Anxiety     Anxiety     Cardiac arrhythmia due to congenital heart disease     COPD (chronic obstructive pulmonary disease) (HCC)     Depression     Diverticulitis     Dizziness     Endometriosis     GERD (gastroesophageal reflux disease)     Kidney stone     OCD (obsessive compulsive disorder)     Ovarian cyst     Recurrent upper respiratory infection (URI)     Sinus arrhythmia          SURGICAL HISTORY       Past Surgical History:   Procedure Laterality Date     SECTION      FRACTURE SURGERY      right ankle    KNEE SURGERY Left 2021    LEFT KNEE VIDEO ARTHROSCOPY, ANTERIOR CRUCIATE LIGAMENT RECONSTRUCTION WITH BONE TO BONE AUTOGRAFT performed by Jaxon Cazares MD at [address]  10/16/2018    excision lower abdominal mass    [place] OFFICE/OUTPT VISIT,PROCEDURE ONLY N/A 10/16/2018    EXCISION ABDOMINAL WALL MASS performed by Kemal Gracia MD at [address]       Discharge Medication List as of 2022  2:17 PM        CONTINUE these medications which have NOT CHANGED    Details   ibuprofen (ADVIL;MOTRIN) 600 MG tablet Take 1 tablet by mouth 4 times daily as needed for Pain, Disp-360 tablet, R-1Normal      acetaminophen (AMINOFEN) 325 MG tablet Take 2 tablets by mouth every 6 hours as needed for Pain, Disp-120 tablet, R-3Normal      sertraline (ZOLOFT) 100 MG tablet Take 1 tablet by mouthHistorical Med      pantoprazole (PROTONIX) 40 MG tablet Historical Med      clonazePAM (KLONOPIN) 0.5 MG tablet Take 1 tablet by mouth every 6 hours as needed for Anxiety, Disp-1 tablet, R-0               ALLERGIES Bactrim ds [sulfamethoxazole-trimethoprim], Duloxetine, Hydromorphone hcl, Ethinyl estradiol, Norgestimate, Other, Sulfa antibiotics, and Trimethoprim    FAMILY HISTORY       Family History   Problem Relation Age of Onset    Arthritis Mother     High Blood Pressure Mother     Heart Disease Father     Heart Disease Maternal Grandmother     Breast Cancer Maternal Grandmother 48    Breast Cancer Maternal Aunt     Breast Cancer Paternal Aunt 36    Breast Cancer Paternal Aunt 40          SOCIAL HISTORY       Social History     Socioeconomic History    Marital status:      Spouse name: None    Number of children: None    Years of education: None    Highest education level: None   Tobacco Use    Smoking status: Former     Packs/day: 0.25     Years: 9.00     Pack years: 2.25     Types: Cigarettes, Pipe     Quit date: 2017     Years since quittin.9    Smokeless tobacco: Never   Vaping Use    Vaping Use: Never used   Substance and Sexual Activity    Alcohol use: Yes     Comment: occasional     Drug use: No    Sexual activity: Yes     Partners: Male       SCREENINGS    Yehuda Coma Scale  Eye Opening: Spontaneous  Best Verbal Response: Oriented  Best Motor Response: Obeys commands  Yehuda Coma Scale Score: 15        PHYSICAL EXAM  (up to 7 for level 4, 8 or more for level 5)     ED Triage Vitals [22 1042]   BP Temp Temp src Heart Rate Resp SpO2 Height Weight   98/61 97.5 °F (36.4 °C) -- 91 16 99 % 5' 2\" (1.575 m) 120 lb (54.4 kg)       Physical Exam  Constitutional:       Appearance: Normal appearance. She is well-developed. HENT:      Head: Normocephalic and atraumatic. Cardiovascular:      Rate and Rhythm: Normal rate. Pulmonary:      Effort: Pulmonary effort is normal. No respiratory distress. Abdominal:      General: There is no distension. Palpations: Abdomen is soft. Tenderness: There is no abdominal tenderness. Musculoskeletal:         General: Normal range of motion. Cervical back: Normal range of motion. Skin:     General: Skin is warm and dry. Neurological:      Mental Status: She is alert and oriented to person, place, and time. DIAGNOSTIC RESULTS   LABS:    Labs Reviewed   COMPREHENSIVE METABOLIC PANEL W/ REFLEX TO MG FOR LOW K - Abnormal; Notable for the following components:       Result Value    ALT 7 (*)     AST 10 (*)     All other components within normal limits   COVID-19 & INFLUENZA COMBO   CBC WITH AUTO DIFFERENTIAL   HCG, SERUM, QUALITATIVE   BRAIN NATRIURETIC PEPTIDE   D-DIMER, QUANTITATIVE   TROPONIN   URINALYSIS WITH REFLEX TO CULTURE           EKG: All EKG's are interpreted by the Emergency Department Physician who either signs or Co-signs this chart in the absence of a cardiologist.  Please see their note for interpretation of EKG. RADIOLOGY:     Chest x-ray interpreted by radiologist for  FINDINGS:   Medical devices: None. Mediastinum/Heart: The mediastinal contours are unchanged compared to prior   exam.     Lungs: The lungs are clear. Pleura: No findings to suggest pneumothorax or large pleural effusion. Bones/Soft tissues: Interfaces reflecting skin folds project over the lower   lateral chest walls bilaterally. No acute findings are present. Interpretation per the Radiologist below, if available at the time of this note:    XR CHEST (2 VW)   Final Result   No acute cardiopulmonary abnormality identified. No results found. No results found.      PROCEDURES   Unless otherwise noted below, none     Procedures           EMERGENCYDEPARTMENT COURSE and DIFFERENTIAL DIAGNOSIS/MDM:   Vitals:    Vitals:    12/12/22 1042 12/12/22 1217 12/12/22 1301 12/12/22 1407   BP: 98/61 104/67 (!) 92/58 (!) 97/56   Pulse: 91 78 72    Resp: 16 15 14 16   Temp: 97.5 °F (36.4 °C)      SpO2: 99% 97% 99% 97%   Weight: 120 lb (54.4 kg)      Height: 5' 2\" (1.575 m)          Patient was given the following medications:  Medications iopamidol (ISOVUE-370) 76 % injection 85 mL (has no administration in time range)       Patient was seen and evaluated by myself and Dr. Ericka Piper. Patient here today for concerns for chest pain. Patient states that she had episode today in which she developed chest tightness and became lightheaded and diaphoretic. Patient also describes having a \"run of PVCs \". Patient reports that she does follow with cardiology at [facility]. She denies any fevers but did have an episode of nausea with this. Patient does have a history of DVTs however is no longer on any blood thinners and does not take aspirin. On exam she is awake and alert hemodynamically stable nontoxic in appearance. Lab values have been reviewed and interpreted. Chest x-ray and EKG were reviewed. Patient was requesting to be discharged. Based on the patient's complaints and the fact that she potentially have a PE patient was signed out [address] She was given the risks and benefits. She reports that she was able to see her lab values in my chart and felt that they were normal so that she could be discharged and she could follow-up with her primary care doctor. Again she was given the risks and benefits of leaving and the fact that she could have a pulmonary emboli. She verbalized understanding. She is able to make her own decisions. Patient was given instructions to follow-up with her primary care doctor and return to the ED for any worsening symptoms. Patient ultimately left AGAINST MEDICAL ADVICE. Is this patient to be included in the SEP-1 Core Measure due to severe sepsis or septic shock? No   Exclusion criteria - the patient is NOT to be included for SEP-1 Core Measure due to:  2+ SIRS criteria are not met       Patient was seen during the time of the [place] pandemic. N95 and appropriate PPE was worn during the visit. The patient tolerated their visit well. I have evaluated this patient.  My supervising physician was available for consultation. The patient and / or the family were informed of the results of any tests, a time was given to answer questions, a plan was proposed and they agreed with plan. FINAL IMPRESSION      1. Chest pain, unspecified type    2. Left against medical advice    3.  H/O deep venous thrombosis          DISPOSITION/PLAN   DISPOSITION [place] 12/12/2022 01:55:33 PM      PATIENT REFERRED TO:  SHERI Guadarrama CNP  [address]  [address]  [phone]    Schedule an appointment as soon as possible for a visit in 2 days  for re-evaluation    [place] [facility]  [address] [facility]  [phone]    If symptoms worsen    DISCHARGE MEDICATIONS:  Discharge Medication List as of 12/12/2022  2:17 PM          DISCONTINUED MEDICATIONS:  Discharge Medication List as of 12/12/2022  2:17 PM                 (Please note that portions of this note were completed with a voice recognition program.  Efforts were made to edit the dictations but occasionally words are mis-transcribed.)    SHERI Avendano CNP (electronically signed)         SHERI Avendano CNP  12/12/22 3046

## 2022-12-12 NOTE — ED PROVIDER NOTES
tenderness in the extremities. All extremities neurovascularly intact. SKIN: Warm and dry. No acute rashes. NEUROLOGICAL: Alert and oriented. No gross facial drooping. Strength 5/5, sensation intact. PSYCHIATRIC: Normal mood and affect. ED course / MDM:   Overall well appearing patient, in no acute distress, presenting for palpitations and lightheadedness. Physical exam unremarkable. I personally saw the patient and performed a substantive portion of the visit including all aspects of the medical decision making. Differential diagnosis includes but is not limited to: Pneumonia, pneumothorax, pleural effusion, ACS, CHF exacerbation, COPD exacerbation, asthma, pulmonary embolism, arrhythmia, anemia    EKG, laboratory studies, and imaging obtained. Workup showed: Workup showed:    Imaging:  XR CHEST (2 VW)   Final Result   No acute cardiopulmonary abnormality identified. CT CHEST PULMONARY EMBOLISM W CONTRAST    (Results Pending)     Chest x-ray showed no evidence of pneumonia, pleural effusion, pulmonary edema, or pneumothorax. Patient left AGAINST MEDICAL ADVICE without CT PE study completion. ECG  The Ekg interpreted by me shows  normal sinus rhythm with a rate of 82  Axis is   Normal  QTc is  within an acceptable range  Intervals and Durations are unremarkable. ST Segments: no acute change  No significant change from prior EKG dated 4/2/22     EKG showed no evidence of acute ischemia or arrhythmia. Troponin was within normal limits. At this time, I have low suspicion for ACS, myocarditis or pericarditis. Heart score: 1. This falls under the following category: Score of 0-3, which indicates a very low risk for major adverse cardiac event and supports early discharge     Is this patient to be included in the SEP-1 Core Measure due to severe sepsis or septic shock?    No   Exclusion criteria - the patient is NOT to be included for SEP-1 Core Measure due to:  2+ SIRS criteria are not met     BNP was within normal limits, no evidence of fluid overload on exam. Low Suspicion for CHF. Low suspicion for aortic pathology. Patient is not hypertensive. Patient has strong pulses in the bilateral radial and femoral arteries. Pain was not maximal at onset. There is no evidence of mediastinal widening on chest x-ray. Patient does not have any neurologic deficits. The evidence indicates that the patient is very low risk for Aortic Dissection and this is consistent with my clinical intuition. The risk of further workup or hospitalization for aortic dissection is likely higher than the risk of the patient having an aortic dissection. No significant electrolyte abnormalities or evidence of kidney dysfunction. Liver function testing unremarkable. No leukocytosis, anemia, or thrombocytopenia. Patient has had a previous blood clot and is not on anticoagulation. Patient denies other risk factors for pulmonary embolism. I do have concerns given patient reports chest pain and lightheadedness. patient does not have any evidence of DVT on exam.  Midlevel provider did order a D-dimer which was reassuring, however I do not consider this patient low risk so do not feel that D-dimer is adequate to rule out pulmonary embolism given patient's reported symptoms. Plan was for CT PE study but patient did not wish to remain in the emergency department any longer to have this completed. Given that patient is still reporting pain, patient was signed out [address] I discussed the nature and purpose, risks and benefits, as well as, the alternatives of CT PE study with Shan Leonard. Camdenbethany Gupta was given the time and opportunity to ask questions and consider their options, and after the discussion, Courtni Myrtis Severance decided to refuse. I informed Aneudy Alonso that refusal could lead to, but was not limited to, death, permanent disability, or severe pain.  If present, I asked the relatives or significant others of Shan Leonard to dissuade them without success. Prior to refusing, their nurse and I determined and agreed that Denae Sr Mt. had the capacity to make this decision and understood the consequences of that decision. They appear clinically sober, to be mentating appropriately, free from distracting injury, appear to have intact insight, judgement, and reason. Specifically, they were able to verbally state back in a coherent manner their current medical condition, the proposed course of treatment, and the risks/benfits/ alternatives of treatment verses leaving against medical advice. Shan Leonard signed the refusal of treatment form and their nurse signed the form agreeing that the patient/guardian had received informed consent. After refusal, I made every reasonable opportunity to treat Shan Leonard to the best of my ability. They understand that they may return to seek medical attention here whenever they choose. Recommended close PCP follow-up. Also recommended the patient follow-up with her cardiologist.  Strict return precautions given. Patient discharged [address] CLINICAL IMPRESSION  1. Chest pain, unspecified type    2. Left against medical advice    3. H/O deep venous thrombosis        Blood pressure (!) 97/56, pulse 72, temperature 97.5 °F (36.4 °C), resp. rate 16, height 5' 2\" (1.575 m), weight 120 lb (54.4 kg), SpO2 97 %, not currently breastfeeding. DISPOSITION  Shan Marie was discharged to home [address]      All diagnostic, treatment, and disposition decisions were made by myself in conjunction with the advanced practice provider. For all further details of the patient's emergency department visit, please see the advanced practice provider's documentation.     Comment: Please note this report has been produced using speech recognition software and may contain errors related to that system including errors in grammar, punctuation, and spelling, as well as words and phrases that may be inappropriate. If there are any questions or concerns please feel free to contact the dictating provider for clarification.             Missy Rosa MD  12/14/22 8991

## 2022-12-29 ENCOUNTER — HOSPITAL ENCOUNTER (EMERGENCY)
Age: 37
Discharge: HOME | End: 2022-12-29
Attending: STUDENT IN AN ORGANIZED HEALTH CARE EDUCATION/TRAINING PROGRAM
Payer: MEDICAID

## 2022-12-29 ENCOUNTER — APPOINTMENT (OUTPATIENT)
Dept: CT IMAGING | Age: 37
End: 2022-12-29
Payer: MEDICAID

## 2022-12-29 VITALS
HEART RATE: 51 BPM | SYSTOLIC BLOOD PRESSURE: 92 MMHG | OXYGEN SATURATION: 97 % | TEMPERATURE: 97.5 F | RESPIRATION RATE: 13 BRPM | BODY MASS INDEX: 22.82 KG/M2 | HEIGHT: 62 IN | DIASTOLIC BLOOD PRESSURE: 56 MMHG | WEIGHT: 124 LBS

## 2022-12-29 DIAGNOSIS — R05.1 ACUTE COUGH: ICD-10-CM

## 2022-12-29 DIAGNOSIS — R51.9 ACUTE NONINTRACTABLE HEADACHE, UNSPECIFIED HEADACHE TYPE: ICD-10-CM

## 2022-12-29 DIAGNOSIS — T58.91XA CARBOXYHEMOGLOBINEMIA, ACCIDENTAL OR UNINTENTIONAL, INITIAL ENCOUNTER: ICD-10-CM

## 2022-12-29 DIAGNOSIS — R07.9 CHEST PAIN, UNSPECIFIED TYPE: Primary | ICD-10-CM

## 2022-12-29 LAB
A/G RATIO: 1.5 (ref 1.1–2.2)
ALBUMIN SERPL-MCNC: 4.6 G/DL (ref 3.4–5)
ALP BLD-CCNC: 49 U/L (ref 40–129)
ALT SERPL-CCNC: 11 U/L (ref 10–40)
ANION GAP SERPL CALCULATED.3IONS-SCNC: 8 MMOL/L (ref 3–16)
AST SERPL-CCNC: 12 U/L (ref 15–37)
BASE EXCESS VENOUS: -0.5 MMOL/L (ref -3–3)
BASE EXCESS VENOUS: -3.2 MMOL/L (ref -3–3)
BASOPHILS ABSOLUTE: 0 K/UL (ref 0–0.2)
BASOPHILS RELATIVE PERCENT: 0.6 %
BILIRUB SERPL-MCNC: 0.3 MG/DL (ref 0–1)
BUN BLDV-MCNC: 12 MG/DL (ref 7–20)
CALCIUM SERPL-MCNC: 8.9 MG/DL (ref 8.3–10.6)
CARBOXYHEMOGLOBIN: 4.3 % (ref 0–1.5)
CARBOXYHEMOGLOBIN: 9.7 % (ref 0–1.5)
CHLORIDE BLD-SCNC: 106 MMOL/L (ref 99–110)
CO2: 25 MMOL/L (ref 21–32)
CREAT SERPL-MCNC: 0.7 MG/DL (ref 0.6–1.1)
EKG ATRIAL RATE: 87 BPM
EKG DIAGNOSIS: NORMAL
EKG P AXIS: 69 DEGREES
EKG P-R INTERVAL: 134 MS
EKG Q-T INTERVAL: 360 MS
EKG QRS DURATION: 80 MS
EKG QTC CALCULATION (BAZETT): 433 MS
EKG R AXIS: 61 DEGREES
EKG T AXIS: 39 DEGREES
EKG VENTRICULAR RATE: 87 BPM
EOSINOPHILS ABSOLUTE: 0.1 K/UL (ref 0–0.6)
EOSINOPHILS RELATIVE PERCENT: 1.4 %
GFR SERPL CREATININE-BSD FRML MDRD: >60 ML/MIN/{1.73_M2}
GLUCOSE BLD-MCNC: 99 MG/DL (ref 70–99)
HCG QUALITATIVE: NEGATIVE
HCO3 VENOUS: 22.1 MMOL/L (ref 23–29)
HCO3 VENOUS: 24.8 MMOL/L (ref 23–29)
HCT VFR BLD CALC: 41.5 % (ref 36–48)
HEMOGLOBIN: 13.5 G/DL (ref 12–16)
INFLUENZA A: NOT DETECTED
INFLUENZA B: NOT DETECTED
LACTIC ACID: 0.7 MMOL/L (ref 0.4–2)
LYMPHOCYTES ABSOLUTE: 2 K/UL (ref 1–5.1)
LYMPHOCYTES RELATIVE PERCENT: 35.2 %
MCH RBC QN AUTO: 29.5 PG (ref 26–34)
MCHC RBC AUTO-ENTMCNC: 32.6 G/DL (ref 31–36)
MCV RBC AUTO: 90.4 FL (ref 80–100)
METHEMOGLOBIN VENOUS: 0.3 %
METHEMOGLOBIN VENOUS: 0.3 %
MONOCYTES ABSOLUTE: 0.3 K/UL (ref 0–1.3)
MONOCYTES RELATIVE PERCENT: 6.1 %
NEUTROPHILS ABSOLUTE: 3.2 K/UL (ref 1.7–7.7)
NEUTROPHILS RELATIVE PERCENT: 56.7 %
O2 CONTENT, VEN: 10 VOL %
O2 CONTENT, VEN: 16 VOL %
O2 SAT, VEN: 46 %
O2 SAT, VEN: 96 %
O2 THERAPY: ABNORMAL
O2 THERAPY: ABNORMAL
PCO2, VEN: 40.6 MMHG (ref 40–50)
PCO2, VEN: 43 MMHG (ref 40–50)
PDW BLD-RTO: 14.3 % (ref 12.4–15.4)
PH VENOUS: 7.35 (ref 7.35–7.45)
PH VENOUS: 7.38 (ref 7.35–7.45)
PLATELET # BLD: 278 K/UL (ref 135–450)
PMV BLD AUTO: 7.8 FL (ref 5–10.5)
PO2, VEN: 25.7 MMHG (ref 25–40)
PO2, VEN: 85.6 MMHG (ref 25–40)
POTASSIUM REFLEX MAGNESIUM: 4.2 MMOL/L (ref 3.5–5.1)
PRO-BNP: 37 PG/ML (ref 0–124)
RBC # BLD: 4.59 M/UL (ref 4–5.2)
SARS-COV-2 RNA, RT PCR: NOT DETECTED
SODIUM BLD-SCNC: 139 MMOL/L (ref 136–145)
TCO2 CALC VENOUS: 23 MMOL/L
TCO2 CALC VENOUS: 26 MMOL/L
TOTAL CK: 23 U/L (ref 26–192)
TOTAL PROTEIN: 7.6 G/DL (ref 6.4–8.2)
TROPONIN: <0.01 NG/ML
WBC # BLD: 5.6 K/UL (ref 4–11)

## 2022-12-29 PROCEDURE — 84703 CHORIONIC GONADOTROPIN ASSAY: CPT

## 2022-12-29 PROCEDURE — 6360000002 HC RX W HCPCS: Performed by: STUDENT IN AN ORGANIZED HEALTH CARE EDUCATION/TRAINING PROGRAM

## 2022-12-29 PROCEDURE — 6370000000 HC RX 637 (ALT 250 FOR IP): Performed by: STUDENT IN AN ORGANIZED HEALTH CARE EDUCATION/TRAINING PROGRAM

## 2022-12-29 PROCEDURE — 99285 EMERGENCY DEPT VISIT HI MDM: CPT

## 2022-12-29 PROCEDURE — 87636 SARSCOV2 & INF A&B AMP PRB: CPT

## 2022-12-29 PROCEDURE — 83605 ASSAY OF LACTIC ACID: CPT

## 2022-12-29 PROCEDURE — 71260 CT THORAX DX C+: CPT | Performed by: STUDENT IN AN ORGANIZED HEALTH CARE EDUCATION/TRAINING PROGRAM

## 2022-12-29 PROCEDURE — 80053 COMPREHEN METABOLIC PANEL: CPT

## 2022-12-29 PROCEDURE — 6360000004 HC RX CONTRAST MEDICATION: Performed by: STUDENT IN AN ORGANIZED HEALTH CARE EDUCATION/TRAINING PROGRAM

## 2022-12-29 PROCEDURE — 83880 ASSAY OF NATRIURETIC PEPTIDE: CPT

## 2022-12-29 PROCEDURE — 84484 ASSAY OF TROPONIN QUANT: CPT

## 2022-12-29 PROCEDURE — 82550 ASSAY OF CK (CPK): CPT

## 2022-12-29 PROCEDURE — 96374 THER/PROPH/DIAG INJ IV PUSH: CPT

## 2022-12-29 PROCEDURE — 96375 TX/PRO/DX INJ NEW DRUG ADDON: CPT

## 2022-12-29 PROCEDURE — 93010 ELECTROCARDIOGRAM REPORT: CPT | Performed by: INTERNAL MEDICINE

## 2022-12-29 PROCEDURE — 85025 COMPLETE CBC W/AUTO DIFF WBC: CPT

## 2022-12-29 PROCEDURE — 82803 BLOOD GASES ANY COMBINATION: CPT

## 2022-12-29 PROCEDURE — 2580000003 HC RX 258: Performed by: STUDENT IN AN ORGANIZED HEALTH CARE EDUCATION/TRAINING PROGRAM

## 2022-12-29 PROCEDURE — 93005 ELECTROCARDIOGRAM TRACING: CPT | Performed by: STUDENT IN AN ORGANIZED HEALTH CARE EDUCATION/TRAINING PROGRAM

## 2022-12-29 PROCEDURE — 36415 COLL VENOUS BLD VENIPUNCTURE: CPT

## 2022-12-29 RX ORDER — ASCORBIC ACID 500 MG
500 TABLET ORAL 2 TIMES DAILY
Qty: 14 TABLET | Refills: 0 | Status: SHIPPED | OUTPATIENT
Start: 2022-12-29 | End: 2023-01-05

## 2022-12-29 RX ORDER — DIPHENHYDRAMINE HYDROCHLORIDE 50 MG/ML
25 INJECTION INTRAMUSCULAR; INTRAVENOUS ONCE
Status: COMPLETED | OUTPATIENT
Start: 2022-12-29 | End: 2022-12-29

## 2022-12-29 RX ORDER — PROCHLORPERAZINE EDISYLATE 5 MG/ML
10 INJECTION INTRAMUSCULAR; INTRAVENOUS ONCE
Status: COMPLETED | OUTPATIENT
Start: 2022-12-29 | End: 2022-12-29

## 2022-12-29 RX ORDER — ZINC SULFATE 50(220)MG
50 CAPSULE ORAL DAILY
Qty: 7 CAPSULE | Refills: 0 | Status: SHIPPED | OUTPATIENT
Start: 2022-12-29 | End: 2023-01-05

## 2022-12-29 RX ORDER — BUTALBITAL, ACETAMINOPHEN AND CAFFEINE 50; 325; 40 MG/1; MG/1; MG/1
1 TABLET ORAL ONCE
Status: COMPLETED | OUTPATIENT
Start: 2022-12-29 | End: 2022-12-29

## 2022-12-29 RX ORDER — 0.9 % SODIUM CHLORIDE 0.9 %
1000 INTRAVENOUS SOLUTION INTRAVENOUS ONCE
Status: COMPLETED | OUTPATIENT
Start: 2022-12-29 | End: 2022-12-29

## 2022-12-29 RX ORDER — GUAIFENESIN 600 MG/1
600 TABLET, EXTENDED RELEASE ORAL 2 TIMES DAILY
Qty: 20 TABLET | Refills: 0 | Status: SHIPPED | OUTPATIENT
Start: 2022-12-29 | End: 2023-01-08

## 2022-12-29 RX ADMIN — BUTALBITAL, ACETAMINOPHEN, AND CAFFEINE 1 TABLET: 50; 325; 40 TABLET ORAL at 08:45

## 2022-12-29 RX ADMIN — SODIUM CHLORIDE 1000 ML: 9 INJECTION, SOLUTION INTRAVENOUS at 07:53

## 2022-12-29 RX ADMIN — SODIUM CHLORIDE 1000 ML: 9 INJECTION, SOLUTION INTRAVENOUS at 09:29

## 2022-12-29 RX ADMIN — PROCHLORPERAZINE EDISYLATE 10 MG: 5 INJECTION INTRAMUSCULAR; INTRAVENOUS at 07:51

## 2022-12-29 RX ADMIN — DIPHENHYDRAMINE HYDROCHLORIDE 25 MG: 50 INJECTION, SOLUTION INTRAMUSCULAR; INTRAVENOUS at 07:52

## 2022-12-29 RX ADMIN — IOPAMIDOL 85 ML: 755 INJECTION, SOLUTION INTRAVENOUS at 09:35

## 2022-12-29 ASSESSMENT — PAIN SCALES - GENERAL: PAINLEVEL_OUTOF10: 6

## 2022-12-29 ASSESSMENT — PAIN - FUNCTIONAL ASSESSMENT: PAIN_FUNCTIONAL_ASSESSMENT: 0-10

## 2022-12-29 ASSESSMENT — PAIN DESCRIPTION - LOCATION: LOCATION: OTHER (COMMENT)

## 2022-12-29 NOTE — ED NOTES
IV catheter discontinued intact. Site without signs and symptoms of complications. Dressing and pressure applied. Patient discharge completed. Discharge information included information on diagnosis including signs and symptoms, complications and when to seek medical attention. Information on new medications also provided included use for the medication, side effects and when to call the doctor. Patient verbalized understanding of all discharge information. Patient escorted out by staff with all documented belongings. Home with family.      Derrick Aburto RN  12/29/22 2343

## 2022-12-29 NOTE — Clinical Note
Jose Arguelles was seen and treated in our emergency department on 12/29/2022. She may return to work on 12/30/2022. If you have any questions or concerns, please don't hesitate to call.       Baron Robyn MD

## 2022-12-29 NOTE — ED NOTES
[facility] BP and Pulse complete and charted. Pt states, she felt dizzy and lightheaded when going from a sitting to a standing position.       Lisbeth N[place] RN  12/29/22 0149

## 2022-12-29 NOTE — ED PROVIDER NOTES
[address] ED      CHIEF COMPLAINT  Chest pain, cough, headache       HISTORY OF PRESENT ILLNESS  Shan Pang is a 40 y.o. female with a past medical history of palpitations, anxiety, COPD, arrhythmia, GERD, DVT not on anticoagulation, who presents to the ED complaining of cough, chest discomfort, lightheadedness. Onset: 3 weeks  Chest pain: Yes  Pain Location: Midsternal  Quality: Patient reports a constant burning feeling, occasionally sharp  Radiation: Denies  Severity: 6/10  Palliative Factors: Denies, patient has tried Tylenol  Provocative Factors: Denies, not exertional, pleuritic, or positional  Shortness of breath: Mild  Cough: Yes, productive of yellow and sometimes brown sputum  Fever: Denies  Patient also reports vomiting and diarrhea that is started in the last couple days. Nonbloody nonbilious. Patient also reports a headache that started at least a week ago. Progressive, no thunderclap in onset. Has tried Tylenol for pain. Feels like headache she has had in the past, but reports she does not get headaches frequently. Reports it feels like a band of pressure around her head. Patient does have history of DVT, she is not on anticoagulation. Denies active malignancy. Patient denies unilateral leg swelling, hemoptysis, recent travel or surgery/immobilization, or OCP or other hormone use. Patient is not post partum. Family history:   Family History   Problem Relation Age of Onset    Arthritis Mother     High Blood Pressure Mother     Heart Disease Father     Heart Disease Maternal Grandmother     Breast Cancer Maternal Grandmother 48    Breast Cancer Maternal Aunt     Breast Cancer Paternal Aunt 36    Breast Cancer Paternal Aunt 40     Patient does not smoke. Patient denies cocaine or other drug use. Old records reviewed: Patient was seen on 12/12 for palpitations, chest tightness, and lightheadedness.   Her work-up that was completed at that time was overall reassuring, however patient left AGAINST MEDICAL ADVICE prior to completion of CT PE study. Patient reports the symptoms are continuation of her previous symptoms. Patient was diagnosed with COVID in August of this year, seen in both October and November for upper respiratory infectious symptoms. Patient was admitted in April 2022 after presenting for syncope. Patient had echocardiogram and stress test during that admission. Echo 4/2022  Summary   Normal left ventricular size and wall thickness. Left ventricular systolic function is normal with ejection fraction estimated at 55-60%. No regional wall motion abnormalities. Normal left ventricular diastolic function. The right ventricle is normal in size and function. Trace mitral regurgitation with no evidence of mitral valve prolapse. Mild tricuspid regurgitation. Systolic pulmonary artery pressure (SPAP) is normal estimated at 16 mmHg (Right atrial pressure of 3 mmHg). Stress Test 4/2022  Summary    Reduced tracer uptake anterior wall on rest scan with normalization on    stress. Pattern of reverse redistribution, consistent with shifting breast    artifact. No definitive ischemia or scar. Normal regional wall motion and    thickening. Post-stress LVEF is >70%. TID < 1.3. Overall findings represent a low risk scan. No other complaints, modifying factors or associated symptoms. I have reviewed the following from the nursing documentation.     Past Medical History:   Diagnosis Date    Allergic rhinitis     Ankle fracture, right     Anxiety     Anxiety     Cardiac arrhythmia due to congenital heart disease     COPD (chronic obstructive pulmonary disease) (HCC)     Depression     Diverticulitis     Dizziness     Endometriosis     GERD (gastroesophageal reflux disease)     Kidney stone     OCD (obsessive compulsive disorder)     Ovarian cyst     Recurrent upper respiratory infection (URI)     Sinus arrhythmia      Past Surgical History: Procedure Laterality Date     SECTION      FRACTURE SURGERY      right ankle    KNEE SURGERY Left 2021    LEFT KNEE VIDEO ARTHROSCOPY, ANTERIOR CRUCIATE LIGAMENT RECONSTRUCTION WITH BONE TO BONE AUTOGRAFT performed by Leandra Leonard MD at [place] [address]  10/16/2018    excision lower abdominal mass    NE OFFICE/OUTPT VISIT,PROCEDURE ONLY N/A 10/16/2018    EXCISION ABDOMINAL WALL MASS performed by Tank Vieyra MD at [facility] OR     Family History   Problem Relation Age of Onset    Arthritis Mother     High Blood Pressure Mother     Heart Disease Father     Heart Disease Maternal Grandmother     Breast Cancer Maternal Grandmother 48    Breast Cancer Maternal Aunt     Breast Cancer Paternal Aunt 36    Breast Cancer Paternal Aunt 40     Social History     Socioeconomic History    Marital status:      Spouse name: Not on file    Number of children: Not on file    Years of education: Not on file    Highest education level: Not on file   Occupational History    Not on file   Tobacco Use    Smoking status: Former     Packs/day: 0.25     Years: 9.00     Pack years: 2.25     Types: Cigarettes, Pipe     Quit date: 2017     Years since quittin.9    Smokeless tobacco: Never   Vaping Use    Vaping Use: Never used   Substance and Sexual Activity    Alcohol use: Yes     Comment: occasional     Drug use: No    Sexual activity: Yes     Partners: Male   Other Topics Concern    Not on file   Social History Narrative    Not on file     Social Determinants of Health     Financial Resource Strain: Not on file   Food Insecurity: Not on file   Transportation Needs: Not on file   Physical Activity: Not on file   Stress: Not on file   Social Connections: Not on file   Intimate Partner Violence: Not on file   Housing Stability: Not on file     No current facility-administered medications for this encounter.      Current Outpatient Medications   Medication Sig Dispense Refill ibuprofen (ADVIL;MOTRIN) 600 MG tablet Take 1 tablet by mouth 4 times daily as needed for Pain 360 tablet 1    acetaminophen (AMINOFEN) 325 MG tablet Take 2 tablets by mouth every 6 hours as needed for Pain 120 tablet 3    sertraline (ZOLOFT) 100 MG tablet Take 1 tablet by mouth      pantoprazole (PROTONIX) 40 MG tablet       clonazePAM (KLONOPIN) 0.5 MG tablet Take 1 tablet by mouth every 6 hours as needed for Anxiety (Patient taking differently: Take 1 mg by mouth every 6 hours as needed for Anxiety.) 1 tablet 0     Allergies   Allergen Reactions    Bactrim Ds [Sulfamethoxazole-Trimethoprim] Anaphylaxis    Duloxetine Anaphylaxis    Hydromorphone Hcl      Other reaction(s): (moderate to severe)    Ethinyl Estradiol     Norgestimate     Other      States she is allergic to orthotrycycline    Sulfa Antibiotics     Trimethoprim      Other reaction(s): Trouble Breathing       REVIEW OF SYSTEMS  All systems reviewed, pertinent positives per HPI otherwise noted to be negative. PHYSICAL EXAM  BP 96/63   Pulse 79   Temp 97.5 °F (36.4 °C) (Oral)   Resp 18   Ht 5' 2\" (1.575 m)   Wt 124 lb (56.2 kg)   SpO2 100%   BMI 22.68 kg/m²    GENERAL APPEARANCE: Awake and alert. Cooperative. no distress. HENT: Normocephalic. Atraumatic. Mucous membranes are moist  NECK: Supple. Full range of motion of the neck without stiffness or pain. No meningismus. Patient has tenderness to palpation of the left paraspinal muscles. EYES: PERRL. EOM's grossly intact. HEART/CHEST: RRR. No murmurs. Chest wall is not tender to palpation. LUNGS: Respirations unlabored. CTAB. Good air exchange. Speaking comfortably in full sentences. ABDOMEN: No tenderness. Soft. Non-distended. No masses. No organomegaly. No guarding or rebound. MUSCULOSKELETAL: No extremity edema, lower extremities are equal bilaterally and nontender to palpation. Compartments soft. No deformity. No tenderness in the extremities.   All extremities neurovascularly intact. SKIN: Warm and dry. No acute rashes. NEUROLOGICAL: Alert and oriented x3. No facial drooping. Strength 5/5, sensation intact. NIH stroke scale of 0. GCS 15. PSYCHIATRIC: Normal mood and affect. LABS  I have reviewed all labs for this visit.    Results for orders placed or performed during the hospital encounter of 12/29/22   COVID-19 & Influenza Combo    Specimen: Nasopharyngeal Swab   Result Value Ref Range    SARS-CoV-2 RNA, RT PCR NOT DETECTED NOT DETECTED    INFLUENZA A NOT DETECTED NOT DETECTED    INFLUENZA B NOT DETECTED NOT DETECTED   CBC with Auto Differential   Result Value Ref Range    WBC 5.6 4.0 - 11.0 K/uL    RBC 4.59 4.00 - 5.20 M/uL    Hemoglobin 13.5 12.0 - 16.0 g/dL    Hematocrit 41.5 36.0 - 48.0 %    MCV 90.4 80.0 - 100.0 fL    MCH 29.5 26.0 - 34.0 pg    MCHC 32.6 31.0 - 36.0 g/dL    RDW 14.3 12.4 - 15.4 %    Platelets 814 008 - 812 K/uL    MPV 7.8 5.0 - 10.5 fL    Neutrophils % 56.7 %    Lymphocytes % 35.2 %    Monocytes % 6.1 %    Eosinophils % 1.4 %    Basophils % 0.6 %    Neutrophils Absolute 3.2 1.7 - 7.7 K/uL    Lymphocytes Absolute 2.0 1.0 - 5.1 K/uL    Monocytes Absolute 0.3 0.0 - 1.3 K/uL    Eosinophils Absolute 0.1 0.0 - 0.6 K/uL    Basophils Absolute 0.0 0.0 - 0.2 K/uL   CMP w/ Reflex to MG   Result Value Ref Range    Sodium 139 136 - 145 mmol/L    Potassium reflex Magnesium 4.2 3.5 - 5.1 mmol/L    Chloride 106 99 - 110 mmol/L    CO2 25 21 - 32 mmol/L    Anion Gap 8 3 - 16    Glucose 99 70 - 99 mg/dL    BUN 12 7 - 20 mg/dL    Creatinine 0.7 0.6 - 1.1 mg/dL    Est, Glom Filt Rate >60 >60    Calcium 8.9 8.3 - 10.6 mg/dL    Total Protein 7.6 6.4 - 8.2 g/dL    Albumin 4.6 3.4 - 5.0 g/dL    Albumin/Globulin Ratio 1.5 1.1 - 2.2    Total Bilirubin 0.3 0.0 - 1.0 mg/dL    Alkaline Phosphatase 49 40 - 129 U/L    ALT 11 10 - 40 U/L    AST 12 (L) 15 - 37 U/L   HCG Qualitative, Serum   Result Value Ref Range    hCG Qual Negative Detects HCG level >10 MIU/mL   Troponin   Result Value Ref Range    Troponin <0.01 <0.01 ng/mL   Brain Natriuretic Peptide   Result Value Ref Range    Pro-BNP 37 0 - 124 pg/mL   Blood gas, venous   Result Value Ref Range    pH, [place] 7.378 7.350 - 7.450    pCO2, [place] 43.0 40.0 - 50.0 mmHg    pO2, [place] 25.7 25.0 - 40.0 mmHg    HCO3, Venous 24.8 23.0 - 29.0 mmol/L    Base Excess, Jar[place] -0.5 -3.0 - 3.0 mmol/L    O2 Sat, [place] 46 Not Established %    Carboxyhemoglobin 9.7 (H) 0.0 - 1.5 %    MetHgb, Jar[place]o 0.3 <1.5 %    TC02 (Calc), [place] 26 Not Established mmol/L    O2 Content, Jar[place]o 10 Not Established VOL %    O2 Therapy Unknown    CK   Result Value Ref Range    Total CK 23 (L) 26 - 192 U/L   Lactic Acid   Result Value Ref Range    Lactic Acid 0.7 0.4 - 2.0 mmol/L   Blood gas, venous   Result Value Ref Range    pH, Jar[place] 7.353 7.350 - 7.450    pCO2, [place] 40.6 40.0 - 50.0 mmHg    pO2, [place] 85.6 (H) 25.0 - 40.0 mmHg    HCO3, Venous 22.1 (L) 23.0 - 29.0 mmol/L    Base Excess, Jar[place] -3.2 (L) -3.0 - 3.0 mmol/L    O2 Sat, Jar[place] 96 Not Established %    Carboxyhemoglobin 4.3 (H) 0.0 - 1.5 %    MetHgb, Jar[place]o 0.3 <1.5 %    TC02 (Calc), Jar[place]o 23 Not Established mmol/L    O2 Content, Jarocho 16 Not Established VOL %    O2 Therapy Unknown        ECG  The Ekg interpreted by me shows  normal sinus rhythm with a rate of 87  Axis is   Normal  QTc is  within an acceptable range  Intervals and Durations are unremarkable. ST Segments: no acute change  No significant change from prior EKG dated 12/12/12      RADIOLOGY  CT CHEST PULMONARY EMBOLISM W CONTRAST   Final Result   1. No acute pulmonary embolism. 2. No acute infiltrate or significant congestion. 3. Nodular triangular soft tissue in the anterior mediastinum is likely   residual thymus. This is similar to previous CT of the neck dated January 28, 2021. 4. Punctate calculus superior pole left kidney, image 271 series 5 versus   early excreted contrast.                ED COURSE / MDM  Patient seen and evaluated.  Old records reviewed and pertinent information included in HPI. Labs and imaging reviewed and results discussed with patient. Overall well appearing patient, in no acute distress, presenting for URI symptoms, cough, and headache. Symptoms have been persistent and worsening over the past 3 weeks. Physical exam remarkable for intact neurologic exam.  Patient had left AMA prior to getting a CT PE study when seen recently for similar complaints. Differential diagnosis includes but is not limited to: COVID, flu, Pneumonia, pneumothorax, pleural effusion, ACS, CHF exacerbation, COPD exacerbation, pulmonary embolism, arrhythmia, anemia, viral illness, Tension headache, migraine, sinusitis    Patient does not have fever, altered mental status, or meningismus on exam.  Low suspicion for bacterial meningitis at this time. Patient denies any trauma. They are not on blood thinners. Headache was not thunderclap in nature. Low suspicion for intracranial hemorrhage at this time. Patient does not have focal pain over the temples, no tenderness to palpation of the temples. Low suspicion for temporal arteritis. No focal neurologic deficits identified on history or exam.  Low suspicion for stroke. EKG, laboratory studies, and imaging obtained. Workup showed:    Orthostatic blood pressure showed no significant change with position changes, however heart rate does increase 16 beats when changing from sitting to standing. Patient did report some positional lightheadedness, dehydration may be playing a role in symptomology. Patient receiving fluids. Patient's blood pressure is somewhat soft, however review of previous visits this is baseline for her. ED Course as of 12/31/22 1936   u Dec 29, 2022   0555 EKG without evidence of acute arrhythmia or ischemia [ER]   0812 No leukocytosis, anemia, thrombocytopenia [ER]   0815 Blood gas obtained due to patient history of COPD. No acidemia or hypercarbia. Carboxyhemoglobin is elevated to 9.7.  [ER]   0943 Patient is not pregnant [ER]   0825 Did discuss carboxyhemoglobin level with the patient. She states she no longer smokes. She lives alone. She does report that she had furnace work approximately 3 weeks ago given that the furnace was malfunctioning. She does not own a carbon monoxide detector. Discussed with patient that her symptoms could be related to carbon monoxide poisoning but her current levels are below values that require treatment. We did discuss that she needs to purchase a carbon monoxide detector on her way home if she is discharged. Patient understands [ER]   0827 Troponin within normal limits, EKG without evidence of acute ischemia. Low suspicion for ACS. [ER]   0827 BNP within normal limits, no evidence of fluid overload on exam.  Low suspicion for CHF. [ER]   0827 No electrolyte abnormalities or evidence of kidney dysfunction [ER]   0827 Liver function testing unremarkable [ER]   0831 COVID and flu swab negative [ER]   0833 Lactate within normal limits. Patient has normal lactate and no acidosis, this is reassuring that patient is less likely to have significant carbon monoxide poisoning. [ER]   0835 Total CK is not elevated [ER]   1015 CT PE: IMPRESSION:  1. No acute pulmonary embolism. 2. No acute infiltrate or significant congestion. 3. Nodular triangular soft tissue in the anterior mediastinum is likely residual thymus. This is similar to previous CT of the neck dated January 28, 2021. 4. Punctate calculus superior pole left kidney, image 271 series 5 versus early excreted contrast. [ER]   1101 Blood gas shows no acidemia or hypercarbia. Repeat carboxyhemoglobin improving to 4.3. Again did stress to patient need for obtaining a carbon monoxide detector on her way home from the hospital [ER]   1110 On reassessment, patient does feel better. Patient's preference is for discharge home. Patient's blood pressure remains soft, but is similar to previous levels.   Patient has received hydration in the emergency department. [ER]   [phone] Patient did call in regarding allergy to sulfa antibiotics, she raised concern that she was prescribed zinc sulfate. Did confirm with pharmacy, no concern for cross-reactivity. Patient was counseled that she does not need to take it if she does not want to given it is just an immune booster. [ER]      ED Course User Index  [ER] Farzaneh Pereira MD        During the patient's ED course, the patient was given:  Medications   0.9 % sodium chloride bolus (0 mLs IntraVENous Stopped 12/29/22 1042)   prochlorperazine (COMPAZINE) injection 10 mg (10 mg IntraVENous Given 12/29/22 0751)   diphenhydrAMINE (BENADRYL) injection 25 mg (25 mg IntraVENous Given 12/29/22 0752)   butalbital-acetaminophen-caffeine (FIORICET, ESGIC) per tablet 1 tablet (1 tablet Oral Given 12/29/22 0845)   iopamidol (ISOVUE-370) 76 % injection 85 mL (85 mLs IntraVENous Given 12/29/22 0935)   0.9 % sodium chloride bolus (0 mLs IntraVENous Stopped 12/29/22 1043)        Is this patient to be included in the SEP-1 Core Measure due to severe sepsis or septic shock? No   Exclusion criteria - the patient is NOT to be included for SEP-1 Core Measure due to:  2+ SIRS criteria are not met     CONSULTS: (Who and What was discussed)  NONE    Social Determinants affecting Dx or Tx: Denies    Work-up overall reassuring. Based off constellation of symptoms, will suspect likely URI. Did encourage patient to follow-up with her cardiologist.  We also had extensive conversation about her elevated carboxyhemoglobin and need to obtain a carbon monoxide detector prior to returning to her home. Patient understands and states she will purchase on her way home. At this time, feel the patient is appropriate for discharge to follow-up with a primary care doctor. Patient feels comfortable with discharge at this time. Patient was provided with prescriptions for zinc, vitamin C, and Mucinex. Return precautions given. Encouraged PCP follow-up as soon as possible. Patient discharged in stable condition. I estimate there is LOW risk for ACUTE CORONARY SYNDROME, PULMONARY EMBOLISM, PNEUMOTHORAX, RUPTURED ESOPHAGUS OR THORACIC AORTIC DISSECTION, thus I consider the discharge disposition reasonable. Shan Leonard and I have discussed the diagnosis and risks, and we agree with discharging home with close follow-up. We also discussed returning to the Emergency Department immediately if new or worsening symptoms occur. We have discussed the symptoms which are most concerning that necessitate immediate return. CLINICAL IMPRESSION  1. Chest pain, unspecified type    2. Acute cough    3. Acute nonintractable headache, unspecified headache type    4. Carboxyhemoglobinemia, accidental or unintentional, initial encounter        Blood pressure (!) 92/56, pulse 51, temperature 97.5 °F (36.4 °C), temperature source Oral, resp. rate 13, height 5' 2\" (1.575 m), weight 124 lb (56.2 kg), SpO2 97 %, not currently breastfeeding. DISPOSITION  Shan Leonard was discharged to home in stable condition. Patient was given scripts for the following medications. I counseled patient how to take these medications. Discharge Medication List as of 12/29/2022 11:17 AM        START taking these medications    Details   zinc sulfate (ZINCATE) 220 (50 Zn) MG capsule Take 1 capsule by mouth daily for 7 days, Disp-7 capsule, R-0Normal      ascorbic acid (VITAMIN C) 500 MG tablet Take 1 tablet by mouth 2 times daily for 7 days, Disp-14 tablet, R-0Normal      guaiFENesin (MUCINEX) 600 MG extended release tablet Take 1 tablet by mouth 2 times daily for 10 days, Disp-20 tablet, R-0Normal             Follow-up with:  Zuly Nina, APRN - CNP  [address]  [address] [facility]  [phone]          DISCLAIMER: This chart was created using Dragon dictation software.   Efforts were made by me to ensure accuracy, however some errors may be present due to limitations of this technology and occasionally words are not transcribed correctly.               Yris Dinh MD  12/31/22 [id number]

## 2022-12-29 NOTE — DISCHARGE INSTRUCTIONS
As we discussed, one of your laboratory levels were elevated that could suggest you are being exposed to carbon monoxide. Your levels were below require treatment levels. This test is somewhat nonspecific so we cannot definitively say your symptoms related to carbon monoxide exposure. However, as we discussed, we strongly recommend that you purchase a carbon monoxide detector on your way home today so that you can assess that your home environment is safe.

## 2023-02-08 ENCOUNTER — HOSPITAL ENCOUNTER (EMERGENCY)
Age: 38
Discharge: HOME | End: 2023-02-08
Attending: EMERGENCY MEDICINE
Payer: MEDICAID

## 2023-02-08 VITALS
BODY MASS INDEX: 22.08 KG/M2 | OXYGEN SATURATION: 100 % | DIASTOLIC BLOOD PRESSURE: 62 MMHG | RESPIRATION RATE: 18 BRPM | WEIGHT: 120 LBS | SYSTOLIC BLOOD PRESSURE: 102 MMHG | TEMPERATURE: 97.7 F | HEIGHT: 62 IN | HEART RATE: 74 BPM

## 2023-02-08 DIAGNOSIS — J06.9 ACUTE UPPER RESPIRATORY INFECTION: Primary | ICD-10-CM

## 2023-02-08 PROCEDURE — 87804 INFLUENZA ASSAY W/OPTIC: CPT

## 2023-02-08 PROCEDURE — 87077 CULTURE AEROBIC IDENTIFY: CPT

## 2023-02-08 PROCEDURE — 87880 STREP A ASSAY W/OPTIC: CPT

## 2023-02-08 PROCEDURE — 87081 CULTURE SCREEN ONLY: CPT

## 2023-02-08 PROCEDURE — 99283 EMERGENCY DEPT VISIT LOW MDM: CPT

## 2023-02-08 PROCEDURE — 87635 SARS-COV-2 COVID-19 AMP PRB: CPT

## 2023-02-08 RX ORDER — BUSPIRONE HYDROCHLORIDE 5 MG/1
TABLET ORAL
COMMUNITY
Start: 2023-01-13

## 2023-02-08 SDOH — ECONOMIC STABILITY: FOOD INSECURITY: WITHIN THE PAST 12 MONTHS, THE FOOD YOU BOUGHT JUST DIDN'T LAST AND YOU DIDN'T HAVE MONEY TO GET MORE.: NEVER TRUE

## 2023-02-08 ASSESSMENT — ENCOUNTER SYMPTOMS: COUGH: 1

## 2023-02-08 ASSESSMENT — LIFESTYLE VARIABLES
HOW MANY STANDARD DRINKS CONTAINING ALCOHOL DO YOU HAVE ON A TYPICAL DAY: PATIENT DOES NOT DRINK
HOW OFTEN DO YOU HAVE A DRINK CONTAINING ALCOHOL: NEVER

## 2023-02-08 ASSESSMENT — PAIN - FUNCTIONAL ASSESSMENT
PAIN_FUNCTIONAL_ASSESSMENT: NONE - DENIES PAIN
PAIN_FUNCTIONAL_ASSESSMENT: 0-10

## 2023-02-08 ASSESSMENT — PAIN SCALES - GENERAL: PAINLEVEL_OUTOF10: 6

## 2023-02-08 NOTE — ED PROVIDER NOTES
[facility]      Pt Name: Amari Cohen  MRN: [id number]  [place] 1985  Date of evaluation: 2023  Provider: Thais Quesada MD    [address] [place] [address]       Chief Complaint   Patient presents with    Concern For COVID-19     Pt ambulates into ED with complaints of sore throat, body aches, back pain, fever, cough, and congestion. States symptoms for 3 days. Also states she has been exposed to strep and covid. HISTORY OF PRESENT ILLNESS   (Location/Symptom, Timing/Onset, Context/Setting, Quality, Duration, Modifying Factors, Severity)  Note limiting factors. Amari Cohen is a 41 y.o. female who presents to the emergency department     Patient presents with sore throat and coughing she has been exposed to strep COVID and influenza    The history is provided by the patient. Nursing Notes were reviewed. REVIEW OF SYSTEMS    (2-9 systems for level 4, 10 or more for level 5)     Review of Systems   Constitutional:  Positive for activity change, appetite change and chills. HENT:  Positive for congestion. Respiratory:  Positive for cough. Allergic/Immunologic: Negative for immunocompromised state. All other systems reviewed and are negative. Except as noted above the remainder of the review of systems was reviewed and negative.        PAST MEDICAL HISTORY     Past Medical History:   Diagnosis Date    Allergic rhinitis     Ankle fracture, right     Anxiety     Anxiety     Cardiac arrhythmia due to congenital heart disease     COPD (chronic obstructive pulmonary disease) (HCC)     Depression     Diverticulitis     Dizziness     Endometriosis     GERD (gastroesophageal reflux disease)     Kidney stone     OCD (obsessive compulsive disorder)     Ovarian cyst     Recurrent upper respiratory infection (URI)     Sinus arrhythmia          SURGICAL HISTORY       Past Surgical History:   Procedure Laterality Date     SECTION      FRACTURE SURGERY      right ankle    KNEE SURGERY Left 6/22/2021    LEFT KNEE VIDEO ARTHROSCOPY, ANTERIOR CRUCIATE LIGAMENT RECONSTRUCTION WITH BONE TO BONE AUTOGRAFT performed by Sayda Ramon MD at [address]  10/16/2018    excision lower abdominal mass    DE OFFICE/OUTPT VISIT,PROCEDURE ONLY N/A 10/16/2018    EXCISION ABDOMINAL WALL MASS performed by Maylin Bobo MD at [address]       Previous Medications    ACETAMINOPHEN (AMINOFEN) 325 MG TABLET    Take 2 tablets by mouth every 6 hours as needed for Pain    ASCORBIC ACID (VITAMIN C) 500 MG TABLET    Take 1 tablet by mouth 2 times daily for 7 days    BUSPIRONE (BUSPAR) 5 MG TABLET    take 1 tablet by oral route once a day at bedtime for anxiety    CLONAZEPAM (KLONOPIN) 0.5 MG TABLET    Take 1 tablet by mouth every 6 hours as needed for Anxiety    IBUPROFEN (ADVIL;MOTRIN) 600 MG TABLET    Take 1 tablet by mouth 4 times daily as needed for Pain    PANTOPRAZOLE (PROTONIX) 40 MG TABLET        SERTRALINE (ZOLOFT) 100 MG TABLET    Take 1 tablet by mouth    ZINC SULFATE (ZINCATE) 220 (50 ZN) MG CAPSULE    Take 1 capsule by mouth daily for 7 days       ALLERGIES     Bactrim ds [sulfamethoxazole-trimethoprim], Duloxetine, Hydromorphone hcl, Ethinyl estradiol, Norgestimate, Other, Sulfa antibiotics, and Trimethoprim    FAMILY HISTORY       Family History   Problem Relation Age of Onset    Arthritis Mother     High Blood Pressure Mother     Heart Disease Father     Heart Disease Maternal Grandmother     Breast Cancer Maternal Grandmother 48    Breast Cancer Maternal Aunt     Breast Cancer Paternal Aunt 36    Breast Cancer Paternal Aunt 40          SOCIAL HISTORY       Social History     Socioeconomic History    Marital status:      Spouse name: None    Number of children: None    Years of education: None    Highest education level: None   Tobacco Use    Smoking status: Former     Packs/day: 0.25     Years: 9.00     Pack years: 2.25     Types: Cigarettes, Pipe     Quit date: 2017     Years since quittin.0    Smokeless tobacco: Never   Vaping Use    Vaping Use: Never used   Substance and Sexual Activity    Alcohol use: Yes     Comment: occasional     Drug use: No    Sexual activity: Yes     Partners: Male       SCREENINGS    [place] Coma Scale  Eye Opening: Spontaneous  Best Verbal Response: Oriented  Best Motor Response: Obeys commands  [place] Coma Scale Score: 15          PHYSICAL EXAM    (up to 7 for level 4, 8 or more for level 5)     ED Triage Vitals [23 0720]   BP Temp Temp Source Heart Rate Resp SpO2 Height Weight   102/62 97.7 °F (36.5 °C) Oral 74 18 100 % 5' 2\" (1.575 m) 120 lb (54.4 kg)       Physical Exam  Vitals and nursing note reviewed. Constitutional:       Appearance: Normal appearance. Cardiovascular:      Rate and Rhythm: Normal rate. Pulmonary:      Effort: Pulmonary effort is normal.      Breath sounds: Normal breath sounds. No wheezing or rhonchi. Neurological:      Mental Status: She is alert.        DIAGNOSTIC RESULTS     EKG: All EKG's are interpreted by the Emergency Department Physician who either signs or Co-signs this chart in the absence of a cardiologist.        RADIOLOGY:   Non-plain film images such as CT, Ultrasound and MRI are read by the radiologist. Alfred Swan radiographic images are visualized and preliminarily interpreted by the emergency physician with the below findings:        Interpretation per the Radiologist below, if available at the time of this note:    No orders to display           LABS:  Results for orders placed or performed during the hospital encounter of 23   COVID-19, Rapid    Specimen: Nasopharyngeal Swab   Result Value Ref Range    SARS-CoV-2, NAAT Not Detected Not Detected   Rapid influenza A/B antigens    Specimen: Nasopharyngeal   Result Value Ref Range    Rapid Influenza A Ag Negative Negative    Rapid Influenza B Ag Negative Negative Strep Screen Group A Throat    Specimen: Throat   Result Value Ref Range    Rapid Strep A Screen Negative Negative            EMERGENCY DEPARTMENT COURSE and DIFFERENTIAL DIAGNOSIS/MDM:     Vitals:    02/08/23 0720   BP: 102/62   Pulse: 74   Resp: 18   Temp: 97.7 °F (36.5 °C)   TempSrc: Oral   SpO2: 100%   Weight: 120 lb (54.4 kg)   Height: 5' 2\" (1.575 m)           MDM  Historians: Patient only  Limitations of history: None  Medical appropriate history patient presents with a 2 to 3-day history of increasing sore throat congestion coughing runny nose. She has had exposure to strep, COVID-19 and also influenza. She has no other systemic symptoms. Patient does have generalized myalgias. Differential diagnosis includes strep throat, COVID, influenza, upper respiratory infection  Medically appropriate physical exam vital signs are stable and reviewed pulse ox 100% respirations 74 respirations 18 patient's eyes pupils PERRLA well-hydrated oropharynx slightly red but no exudates no signs peritonsillar abscess  Lung sounds are clear no wheezing  Conditions and comorbidities: None  Medications given: None  Labs ordered and reviewed and analyzed. Included negative strep, negative COVID also negative influenza a and B.   No external conversations or consults  Visit summary very pleasant 45-year-old female presents with upper respiratory symptoms including sore throat cough work-up for COVID, strep, and influenza are all negative patient is reassured that she has upper respiratory infection diagnosis acute upper respiratory infection most likely viral  No social determinants were detected prescriptions was felt after medical shared decision making that Tylenol and Advil and fluids and rest would suffice patient vies to at any time to immediately return      [address] [place] [address]     CONSULTS:  None      PROCEDURES:     Procedures    MEDICATIONS GIVEN THIS VISIT:  Medications - No data to display FINAL IMPRESSION      1. Acute upper respiratory infection            DISPOSITION/PLAN   DISPOSITION Decision To Discharge 02/08/2023 07:48:13 AM      PATIENT REFERRED TO:  [facility]  [address]    If symptoms worsen    Emi Ramirez, APRN - CNP  [address] [facility] [address]  [facility]  [phone]    Schedule an appointment as soon as possible for a visit         DISCHARGE MEDICATIONS:  New Prescriptions    No medications on file       Controlled Substances Monitoring  RX Monitoring 6/19/2018   Attestation The Prescription Monitoring Report for this patient was reviewed today. Periodic Controlled Substance Monitoring Possible medication side effects, risk of tolerance/dependence & alternative treatments discussed. (Please note that portions of this note were completed with a voice recognition program.  Efforts were made to edit the dictations but occasionally words are mis-transcribed.)    Patient was advised to return to the Emergency Department if there was any worsening.     Samreen Mendieta MD (electronically signed)  Attending Emergency Physician         Qamar Hernandez MD  02/08/23 2006

## 2023-02-08 NOTE — Clinical Note
Erwin Goodson was seen and treated in our emergency department on 2/8/2023. She may return to work on 02/10/2023. If you have any questions or concerns, please don't hesitate to call.       Thomas Hua MD

## 2023-02-08 NOTE — Clinical Note
Nydia Dinh was seen and treated in our emergency department on 2/8/2023. She may return to work on 02/10/2023. If you have any questions or concerns, please don't hesitate to call.       Prerna Mak MD

## 2023-02-08 NOTE — DISCHARGE INSTRUCTIONS
Take Tylenol 650 mg every 4 hours  Take ibuprofen 600 mg 3 times a day  Drink lots and lots of fluids  Get plenty of rest

## 2023-02-10 LAB
ORGANISM: ABNORMAL
S PYO THROAT QL CULT: ABNORMAL
S PYO THROAT QL CULT: ABNORMAL

## (undated) DEVICE — 3M™ STERI-STRIP™ COMPOUND BENZOIN TINCTURE 40 BAGS/CARTON 4 CARTONS/CASE C1544: Brand: 3M™ STERI-STRIP™

## (undated) DEVICE — GOWN SIRUS NONREIN XL W/TWL: Brand: MEDLINE INDUSTRIES, INC.

## (undated) DEVICE — Z CONVERTED USE 2273232 BANDAGE COMPR W6INXL11YD E KNIT DBL SELF CLSR EZE-BAND

## (undated) DEVICE — WEREWOLF FLOW 90 COBLATION WAND: Brand: COBLATION

## (undated) DEVICE — SUTURE VCRL SZ 3-0 L18IN ABSRB UD L26MM SH 1/2 CIR J864D

## (undated) DEVICE — SOLUTION IRRIG 3000ML 0.9% SOD CHL ARTHROMATIC PLAS CONT

## (undated) DEVICE — SOLUTION IV IRRIG 500ML 0.9% SODIUM CHL 2F7123

## (undated) DEVICE — Device

## (undated) DEVICE — SUTURE ORTHOCORD SZ 2-0 VLT BLU MO-7 NDL MULTIPAK 223114

## (undated) DEVICE — SYRINGE MED 10ML LUERLOCK TIP W/O SFTY DISP

## (undated) DEVICE — PACK,UNIVERSAL,SPLIT,II,AURORA: Brand: MEDLINE

## (undated) DEVICE — PADDING CAST N ADH 12X6 IN CRIMPED FINISH 100% COTTON WBRLII

## (undated) DEVICE — SMARTGOWN BREATHABLE SPECIALTY GOWN: Brand: CONVERTORS

## (undated) DEVICE — TIP SUCT DIA12FR W STYL CTRL VENT DISPOSABLE FRAZ

## (undated) DEVICE — SOLUTION,SALINE,IRRGATION,500ML,STRL: Brand: MEDLINE

## (undated) DEVICE — TUBING PMP IRRIG GOFLO

## (undated) DEVICE — SUTURE VCRL SZ 0 L27IN ABSRB UD L26MM CT-2 1/2 CIR J270H

## (undated) DEVICE — KNIFE SURG 10MM GRFT DISP FOR ACL RECON

## (undated) DEVICE — SUTURE VCRL SZ 2-0 L27IN ABSRB UD L26MM CT-2 1/2 CIR J269H

## (undated) DEVICE — SUTURE VCRL SZ 1 L27IN ABSRB VLT L70MM XLH 1/2 CIR J583G

## (undated) DEVICE — 3M™ TEGADERM™ TRANSPARENT FILM DRESSING FRAME STYLE, 1626W, 4 IN X 4-3/4 IN (10 CM X 12 CM), 50/CT 4CT/CASE: Brand: 3M™ TEGADERM™

## (undated) DEVICE — MAJOR SET UP PK

## (undated) DEVICE — MANIFOLD SURG NEPTUNE WST MGMT

## (undated) DEVICE — KNIFE SURG 15 DEG STBL BLADE

## (undated) DEVICE — SUTURE ETHLN SZ 3-0 L18IN NONABSORBABLE BLK PS-2 L19MM 3/8 1669H

## (undated) DEVICE — CANNULA NSL 13FT TUBE AD ETCO2 DIV SAMP M

## (undated) DEVICE — PACK PROCEDURE SURG SURGERYARTHROSCOPY KNEE

## (undated) DEVICE — SUTURE FIBERWIRE SZ 2 W/ TAPERED NEEDLE BLUE L38IN NONABSORB BLU L26.5MM 1/2 CIRCLE AR7200

## (undated) DEVICE — APPLICATOR PREP 26ML 0.7% IOD POVACRYLEX 74% ISO ALC ST

## (undated) DEVICE — THREE QUARTER SHEET: Brand: CONVERTORS

## (undated) DEVICE — STERILE LATEX POWDER-FREE SURGICAL GLOVESWITH NITRILE COATING: Brand: PROTEXIS

## (undated) DEVICE — NEEDLE HYPO 25GA L1.5IN BLU POLYPR HUB S STL REG BVL STR

## (undated) DEVICE — SUTURE VCRL SZ 4-0 L18IN ABSRB UD L19MM PS-2 3/8 CIR PRIM J496H

## (undated) DEVICE — ELECTRODE PT RET AD L9FT HI MOIST COND ADH HYDRGEL CORDED

## (undated) DEVICE — 4.5 MM INCISOR PLUS STRAIGHT                                    BLADES, POWER/EP-1, VIOLET, PACKAGED                                    6 PER BOX, STERILE

## (undated) DEVICE — GLOVE ORANGE PI 8 1/2   MSG9085

## (undated) DEVICE — DRAPE 54X23IN MAYO STAND COVER REINF

## (undated) DEVICE — GOWN,AURORA,NONREINF,RAGLAN,XXL,STERILE: Brand: MEDLINE

## (undated) DEVICE — SYRINGE 30ML MEDICAL LEUR LOCK TIP WO

## (undated) DEVICE — 3M™ STERI-STRIP™ REINFORCED ADHESIVE SKIN CLOSURES, R1540, 1/8 IN X 3 IN (3 MM X 75 MM), 5 STRIPS/ENVELOPE: Brand: 3M™ STERI-STRIP™

## (undated) DEVICE — GAUZE,SPONGE,4"X4",8PLY,STRL,LF,10/TRAY: Brand: MEDLINE